# Patient Record
Sex: FEMALE | Race: WHITE | NOT HISPANIC OR LATINO
[De-identification: names, ages, dates, MRNs, and addresses within clinical notes are randomized per-mention and may not be internally consistent; named-entity substitution may affect disease eponyms.]

---

## 2017-04-03 ENCOUNTER — APPOINTMENT (OUTPATIENT)
Dept: HEMATOLOGY ONCOLOGY | Facility: CLINIC | Age: 77
End: 2017-04-03

## 2017-04-03 VITALS
HEART RATE: 76 BPM | SYSTOLIC BLOOD PRESSURE: 188 MMHG | WEIGHT: 150 LBS | TEMPERATURE: 100 F | HEIGHT: 62 IN | BODY MASS INDEX: 27.6 KG/M2 | RESPIRATION RATE: 14 BRPM | DIASTOLIC BLOOD PRESSURE: 71 MMHG

## 2017-04-04 LAB
ALBUMIN SERPL-MCNC: 3.9 G/DL
ALBUMIN/GLOB SERPL: 1.22
ALP SERPL-CCNC: 45 IU/L
ALT SERPL-CCNC: 8 IU/L
ANION GAP SERPL CALC-SCNC: 12 MEQ/L
AST SERPL-CCNC: 15 IU/L
BASOPHILS # BLD: 0.09 TH/MM3
BASOPHILS NFR BLD: 0.7 %
BILIRUB SERPL-MCNC: 0.6 MG/DL
BUN SERPL-MCNC: 5 MG/DL
BUN/CREAT SERPL: 8.9 %
CALCIUM SERPL-MCNC: 9.2 MG/DL
CHLORIDE SERPL-SCNC: 101 MEQ/L
CO2 SERPL-SCNC: 29 MEQ/L
CREAT SERPL-MCNC: 0.56 MG/DL
EOSINOPHIL # BLD: 0.13 TH/MM3
EOSINOPHIL NFR BLD: 1 %
ERYTHROCYTE [DISTWIDTH] IN BLOOD BY AUTOMATED COUNT: 15 %
GFR SERPL CREATININE-BSD FRML MDRD: 106
GLUCOSE SERPL-MCNC: 75 MG/DL
GRANULOCYTES # BLD: 8.07 TH/MM3
GRANULOCYTES NFR BLD: 60.8 %
HCT VFR BLD AUTO: 34.5 %
HGB BLD-MCNC: 11 G/DL
IMM GRANULOCYTES # BLD: 0.8 TH/MM3
IMM GRANULOCYTES NFR BLD: 6 %
LACTATE DEHYDROGENASE (NORTH): 187 IU/L
LYMPHOCYTES # BLD: 2.17 TH/MM3
LYMPHOCYTES NFR BLD: 16.3 %
MCH RBC QN AUTO: 27.3 PG
MCHC RBC AUTO-ENTMCNC: 31.9 G/DL
MCV RBC AUTO: 85.6 FL
MONOCYTES # BLD: 2.02 TH/MM3
MONOCYTES NFR BLD: 15.2 %
PLATELET # BLD: 90 TH/MM3
PMV BLD AUTO: 12.3 FL
POTASSIUM SERPL-SCNC: 4.3 MMOL/L
PROT SERPL-MCNC: 7.1 G/DL
RBC # BLD AUTO: 4.03 MIL/MM3
SODIUM SERPL-SCNC: 142 MEQ/L
WBC # BLD: 13.28 TH/MM3

## 2017-04-05 LAB — IGM SERPL-MCNC: 993 MG/DL

## 2017-07-07 ENCOUNTER — APPOINTMENT (OUTPATIENT)
Dept: PLASTIC SURGERY | Facility: CLINIC | Age: 77
End: 2017-07-07

## 2017-07-07 ENCOUNTER — OUTPATIENT (OUTPATIENT)
Dept: OUTPATIENT SERVICES | Facility: HOSPITAL | Age: 77
LOS: 1 days | Discharge: HOME | End: 2017-07-07

## 2017-07-10 DIAGNOSIS — D48.5 NEOPLASM OF UNCERTAIN BEHAVIOR OF SKIN: ICD-10-CM

## 2017-07-14 ENCOUNTER — APPOINTMENT (OUTPATIENT)
Dept: PLASTIC SURGERY | Facility: CLINIC | Age: 77
End: 2017-07-14

## 2017-07-14 DIAGNOSIS — D48.5 NEOPLASM OF UNCERTAIN BEHAVIOR OF SKIN: ICD-10-CM

## 2017-07-21 ENCOUNTER — RESULT REVIEW (OUTPATIENT)
Age: 77
End: 2017-07-21

## 2017-07-21 ENCOUNTER — APPOINTMENT (OUTPATIENT)
Dept: HEMATOLOGY ONCOLOGY | Facility: CLINIC | Age: 77
End: 2017-07-21

## 2017-07-21 ENCOUNTER — OUTPATIENT (OUTPATIENT)
Dept: OUTPATIENT SERVICES | Facility: HOSPITAL | Age: 77
LOS: 1 days | Discharge: HOME | End: 2017-07-21

## 2017-07-21 VITALS
HEIGHT: 62 IN | TEMPERATURE: 99.6 F | RESPIRATION RATE: 14 BRPM | HEART RATE: 76 BPM | SYSTOLIC BLOOD PRESSURE: 200 MMHG | WEIGHT: 150 LBS | DIASTOLIC BLOOD PRESSURE: 83 MMHG | BODY MASS INDEX: 27.6 KG/M2

## 2017-07-21 DIAGNOSIS — C88.4 EXTRANODAL MARGINAL ZONE B-CELL LYMPHOMA OF MUCOSA-ASSOCIATED LYMPHOID TISSUE [MALT-LYMPHOMA]: ICD-10-CM

## 2017-07-26 ENCOUNTER — APPOINTMENT (OUTPATIENT)
Dept: HEMATOLOGY ONCOLOGY | Facility: CLINIC | Age: 77
End: 2017-07-26

## 2017-07-28 ENCOUNTER — APPOINTMENT (OUTPATIENT)
Dept: INFUSION THERAPY | Facility: CLINIC | Age: 77
End: 2017-07-28

## 2017-08-04 ENCOUNTER — RESULT REVIEW (OUTPATIENT)
Age: 77
End: 2017-08-04

## 2017-08-04 ENCOUNTER — OTHER (OUTPATIENT)
Age: 77
End: 2017-08-04

## 2017-08-04 ENCOUNTER — APPOINTMENT (OUTPATIENT)
Dept: INFUSION THERAPY | Facility: CLINIC | Age: 77
End: 2017-08-04

## 2017-08-04 VITALS
HEART RATE: 64 BPM | TEMPERATURE: 97.2 F | SYSTOLIC BLOOD PRESSURE: 162 MMHG | RESPIRATION RATE: 14 BRPM | DIASTOLIC BLOOD PRESSURE: 64 MMHG

## 2017-08-04 LAB
BASOPHILS # BLD: 0.07 TH/MM3
BASOPHILS NFR BLD: 0.3 %
EOSINOPHIL # BLD: 0.13 TH/MM3
EOSINOPHIL NFR BLD: 0.6 %
ERYTHROCYTE [DISTWIDTH] IN BLOOD BY AUTOMATED COUNT: 15.9 %
GRANULOCYTES # BLD: 15.43 TH/MM3
GRANULOCYTES NFR BLD: 68 %
HCT VFR BLD AUTO: 37.7 %
HGB BLD-MCNC: 12.1 G/DL
IMM GRANULOCYTES # BLD: 1.9 TH/MM3
IMM GRANULOCYTES NFR BLD: 8.4 %
LYMPHOCYTES # BLD: 2.24 TH/MM3
LYMPHOCYTES NFR BLD: 9.9 %
MCH RBC QN AUTO: 26.6 PG
MCHC RBC AUTO-ENTMCNC: 32.1 G/DL
MCV RBC AUTO: 82.9 FL
MONOCYTES # BLD: 2.91 TH/MM3
MONOCYTES NFR BLD: 12.8 %
PLATELET # BLD: 97 TH/MM3
PMV BLD AUTO: 11.8 FL
RBC # BLD AUTO: 4.55 MIL/MM3
WBC # BLD: 22.68 TH/MM3

## 2017-08-07 LAB
APPEARANCE UR: CLEAR
BACTERIA UR CULT: NORMAL
BILIRUB UR QL STRIP: NEGATIVE
COLOR UR: YELLOW
GLUCOSE UR STRIP-MCNC: NEGATIVE MG/DL
HGB UR QL STRIP: ABNORMAL
KETONES UR STRIP-MCNC: NEGATIVE MG/DL
NITRITE UR QL STRIP: NEGATIVE
PH UR STRIP: 6.5
PROT UR STRIP-MCNC: NEGATIVE MG/DL
RBC #/AREA URNS HPF: NORMAL P/HPF
SP GR UR STRIP: 1.01
UROBILINOGEN UR STRIP-MCNC: 0.2 MG/DL
WBC URNS QL MICRO: ABNORMAL P/HPF
WBC URNS QL MICRO: NEGATIVE

## 2017-08-11 ENCOUNTER — APPOINTMENT (OUTPATIENT)
Dept: INFUSION THERAPY | Facility: CLINIC | Age: 77
End: 2017-08-11

## 2017-08-11 LAB
BASOPHILS # BLD: 0.08 TH/MM3
BASOPHILS NFR BLD: 0.3 %
EOSINOPHIL # BLD: 0.22 TH/MM3
EOSINOPHIL NFR BLD: 1 %
ERYTHROCYTE [DISTWIDTH] IN BLOOD BY AUTOMATED COUNT: 16.4 %
GRANULOCYTES # BLD: 16.2 TH/MM3
GRANULOCYTES NFR BLD: 70 %
HCT VFR BLD AUTO: 37.8 %
HGB BLD-MCNC: 12.2 G/DL
IMM GRANULOCYTES # BLD: 1.84 TH/MM3
IMM GRANULOCYTES NFR BLD: 7.9 %
LYMPHOCYTES # BLD: 1.99 TH/MM3
LYMPHOCYTES NFR BLD: 8.6 %
MCH RBC QN AUTO: 26.8 PG
MCHC RBC AUTO-ENTMCNC: 32.3 G/DL
MCV RBC AUTO: 83.1 FL
MONOCYTES # BLD: 2.82 TH/MM3
MONOCYTES NFR BLD: 12.2 %
PLATELET # BLD: 94 TH/MM3
RBC # BLD AUTO: 4.55 MIL/MM3
WBC # BLD: 23.15 TH/MM3

## 2017-08-13 RX ORDER — BETAMETHASONE DIPROPIONATE 0.5 MG/G
0.05 CREAM TOPICAL
Qty: 45 | Refills: 0 | Status: COMPLETED | COMMUNITY
Start: 2017-06-13

## 2017-08-14 ENCOUNTER — APPOINTMENT (OUTPATIENT)
Dept: HEMATOLOGY ONCOLOGY | Facility: CLINIC | Age: 77
End: 2017-08-14

## 2017-08-14 VITALS
WEIGHT: 152 LBS | HEIGHT: 62 IN | TEMPERATURE: 98.5 F | BODY MASS INDEX: 27.97 KG/M2 | SYSTOLIC BLOOD PRESSURE: 166 MMHG | HEART RATE: 61 BPM | DIASTOLIC BLOOD PRESSURE: 82 MMHG

## 2017-08-18 ENCOUNTER — APPOINTMENT (OUTPATIENT)
Dept: INFUSION THERAPY | Facility: CLINIC | Age: 77
End: 2017-08-18

## 2017-08-21 LAB
BASOPHILS # BLD: 0.13 TH/MM3
BASOPHILS NFR BLD: 0.5 %
EOSINOPHIL # BLD: 0.24 TH/MM3
EOSINOPHIL NFR BLD: 1 %
ERYTHROCYTE [DISTWIDTH] IN BLOOD BY AUTOMATED COUNT: 16.8 %
GRANULOCYTES # BLD: 16.7 TH/MM3
GRANULOCYTES NFR BLD: 69.4 %
HCT VFR BLD AUTO: 37.9 %
HGB BLD-MCNC: 12.2 G/DL
IMM GRANULOCYTES # BLD: 1.81 TH/MM3
IMM GRANULOCYTES NFR BLD: 7.5 %
LYMPHOCYTES # BLD: 1.65 TH/MM3
LYMPHOCYTES NFR BLD: 6.8 %
MCH RBC QN AUTO: 27 PG
MCHC RBC AUTO-ENTMCNC: 32.2 G/DL
MCV RBC AUTO: 83.8 FL
MONOCYTES # BLD: 3.57 TH/MM3
MONOCYTES NFR BLD: 14.8 %
PLATELET # BLD: 79 TH/MM3
RBC # BLD AUTO: 4.52 MIL/MM3
WBC # BLD: 24.1 TH/MM3

## 2017-09-11 ENCOUNTER — APPOINTMENT (OUTPATIENT)
Dept: HEMATOLOGY ONCOLOGY | Facility: CLINIC | Age: 77
End: 2017-09-11

## 2017-09-11 VITALS
WEIGHT: 151 LBS | HEIGHT: 62 IN | DIASTOLIC BLOOD PRESSURE: 68 MMHG | TEMPERATURE: 97.1 F | RESPIRATION RATE: 14 BRPM | SYSTOLIC BLOOD PRESSURE: 178 MMHG | HEART RATE: 65 BPM | BODY MASS INDEX: 27.79 KG/M2

## 2017-09-11 LAB
BASOPHILS # BLD: 0.07 TH/MM3
BASOPHILS NFR BLD: 0.3 %
EOSINOPHIL # BLD: 0.19 TH/MM3
EOSINOPHIL NFR BLD: 0.9 %
ERYTHROCYTE [DISTWIDTH] IN BLOOD BY AUTOMATED COUNT: 16.7 %
GRANULOCYTES # BLD: 14.8 TH/MM3
GRANULOCYTES NFR BLD: 67.5 %
HCT VFR BLD AUTO: 39.5 %
HGB BLD-MCNC: 12.8 G/DL
IMM GRANULOCYTES # BLD: 1.3 TH/MM3
IMM GRANULOCYTES NFR BLD: 5.9 %
LYMPHOCYTES # BLD: 2.05 TH/MM3
LYMPHOCYTES NFR BLD: 9.4 %
MCH RBC QN AUTO: 27.1 PG
MCHC RBC AUTO-ENTMCNC: 32.4 G/DL
MCV RBC AUTO: 83.7 FL
MONOCYTES # BLD: 3.5 TH/MM3
MONOCYTES NFR BLD: 16 %
PLATELET # BLD: 85 TH/MM3
RBC # BLD AUTO: 4.72 MIL/MM3
WBC # BLD: 21.91 TH/MM3

## 2017-10-06 ENCOUNTER — RESULT REVIEW (OUTPATIENT)
Age: 77
End: 2017-10-06

## 2017-10-06 ENCOUNTER — OUTPATIENT (OUTPATIENT)
Dept: OUTPATIENT SERVICES | Facility: HOSPITAL | Age: 77
LOS: 1 days | Discharge: HOME | End: 2017-10-06

## 2017-10-06 ENCOUNTER — APPOINTMENT (OUTPATIENT)
Dept: HEMATOLOGY ONCOLOGY | Facility: CLINIC | Age: 77
End: 2017-10-06

## 2017-10-06 VITALS
WEIGHT: 153 LBS | HEIGHT: 62 IN | TEMPERATURE: 98.2 F | DIASTOLIC BLOOD PRESSURE: 99 MMHG | BODY MASS INDEX: 28.16 KG/M2 | RESPIRATION RATE: 14 BRPM | HEART RATE: 58 BPM | SYSTOLIC BLOOD PRESSURE: 194 MMHG

## 2017-10-06 DIAGNOSIS — C88.4 EXTRANODAL MARGINAL ZONE B-CELL LYMPHOMA OF MUCOSA-ASSOCIATED LYMPHOID TISSUE [MALT-LYMPHOMA]: ICD-10-CM

## 2017-10-06 LAB
ALBUMIN SERPL-MCNC: 4.2 G/DL
ALBUMIN/GLOB SERPL: 1.56
ALP SERPL-CCNC: 43 IU/L
ALT SERPL-CCNC: 9 IU/L
ANION GAP SERPL CALC-SCNC: 5 MEQ/L
AST SERPL-CCNC: 13 IU/L
BILIRUB SERPL-MCNC: 0.4 MG/DL
BUN SERPL-MCNC: 7 MG/DL
BUN/CREAT SERPL: 12.3 %
CALCIUM SERPL-MCNC: 9.6 MG/DL
CHLORIDE SERPL-SCNC: 106 MEQ/L
CO2 SERPL-SCNC: 30 MEQ/L
CREAT SERPL-MCNC: 0.57 MG/DL
GFR SERPL CREATININE-BSD FRML MDRD: 103
GLUCOSE SERPL-MCNC: 104 MG/DL
POTASSIUM SERPL-SCNC: 4.2 MMOL/L
PROT SERPL-MCNC: 6.9 G/DL
SODIUM SERPL-SCNC: 141 MEQ/L

## 2017-10-10 ENCOUNTER — RESULT REVIEW (OUTPATIENT)
Age: 77
End: 2017-10-10

## 2017-10-10 LAB — IGM SERPL-MCNC: 693 MG/DL

## 2017-12-18 ENCOUNTER — OUTPATIENT (OUTPATIENT)
Dept: OUTPATIENT SERVICES | Facility: HOSPITAL | Age: 77
LOS: 1 days | Discharge: HOME | End: 2017-12-18

## 2017-12-18 ENCOUNTER — APPOINTMENT (OUTPATIENT)
Dept: HEMATOLOGY ONCOLOGY | Facility: CLINIC | Age: 77
End: 2017-12-18

## 2017-12-18 VITALS
SYSTOLIC BLOOD PRESSURE: 148 MMHG | HEART RATE: 67 BPM | WEIGHT: 155 LBS | BODY MASS INDEX: 28.52 KG/M2 | DIASTOLIC BLOOD PRESSURE: 90 MMHG | RESPIRATION RATE: 14 BRPM | HEIGHT: 62 IN | TEMPERATURE: 97.7 F

## 2017-12-18 LAB
BASOPHILS # BLD: 0.08 TH/MM3
BASOPHILS NFR BLD: 0.3 %
EOSINOPHIL # BLD: 0.17 TH/MM3
EOSINOPHIL NFR BLD: 0.6 %
ERYTHROCYTE [DISTWIDTH] IN BLOOD BY AUTOMATED COUNT: 15.4 %
GRANULOCYTES # BLD: 21.68 TH/MM3
GRANULOCYTES NFR BLD: 72.2 %
HCT VFR BLD AUTO: 38 %
HGB BLD-MCNC: 12.1 G/DL
IMM GRANULOCYTES # BLD: 2.38 TH/MM3
IMM GRANULOCYTES NFR BLD: 7.9 %
LYMPHOCYTES # BLD: 1.8 TH/MM3
LYMPHOCYTES NFR BLD: 6 %
MCH RBC QN AUTO: 27.4 PG
MCHC RBC AUTO-ENTMCNC: 31.8 G/DL
MCV RBC AUTO: 86 FL
MONOCYTES # BLD: 3.89 TH/MM3
MONOCYTES NFR BLD: 13 %
PLATELET # BLD: 109 TH/MM3
RBC # BLD AUTO: 4.42 MIL/MM3
WBC # BLD: 30 TH/MM3

## 2017-12-19 LAB
ALBUMIN SERPL-MCNC: 4.3 G/DL
ALBUMIN/GLOB SERPL: 1.79
ALP SERPL-CCNC: 40 IU/L
ALT SERPL-CCNC: 11 IU/L
ANION GAP SERPL CALC-SCNC: 7 MEQ/L
AST SERPL-CCNC: 16 IU/L
BILIRUB SERPL-MCNC: 0.6 MG/DL
BUN SERPL-MCNC: 8 MG/DL
BUN/CREAT SERPL: 13.6 %
CALCIUM SERPL-MCNC: 8.9 MG/DL
CHLORIDE SERPL-SCNC: 105 MEQ/L
CO2 SERPL-SCNC: 28 MEQ/L
CREAT SERPL-MCNC: 0.59 MG/DL
GFR SERPL CREATININE-BSD FRML MDRD: 99
GLUCOSE SERPL-MCNC: 82 MG/DL
IGM SERPL-MCNC: 582 MG/DL
LACTATE DEHYDROGENASE (NORTH): 198 IU/L
POTASSIUM SERPL-SCNC: 4.4 MMOL/L
PROT SERPL-MCNC: 6.7 G/DL
SODIUM SERPL-SCNC: 140 MEQ/L

## 2017-12-20 DIAGNOSIS — C88.4 EXTRANODAL MARGINAL ZONE B-CELL LYMPHOMA OF MUCOSA-ASSOCIATED LYMPHOID TISSUE [MALT-LYMPHOMA]: ICD-10-CM

## 2017-12-26 LAB
BCR/ABL BY PCR (NORTH): NORMAL
JAK2 GENE MUT ANL BLD/T: NORMAL

## 2018-03-24 ENCOUNTER — INPATIENT (INPATIENT)
Facility: HOSPITAL | Age: 78
LOS: 4 days | Discharge: HOME | End: 2018-03-29
Attending: INTERNAL MEDICINE | Admitting: INTERNAL MEDICINE

## 2018-03-24 VITALS
SYSTOLIC BLOOD PRESSURE: 140 MMHG | RESPIRATION RATE: 18 BRPM | TEMPERATURE: 98 F | DIASTOLIC BLOOD PRESSURE: 63 MMHG | HEART RATE: 87 BPM | OXYGEN SATURATION: 100 %

## 2018-03-24 DIAGNOSIS — Z85.72 PERSONAL HISTORY OF NON-HODGKIN LYMPHOMAS: ICD-10-CM

## 2018-03-24 DIAGNOSIS — R06.09 OTHER FORMS OF DYSPNEA: ICD-10-CM

## 2018-03-24 DIAGNOSIS — D69.6 THROMBOCYTOPENIA, UNSPECIFIED: ICD-10-CM

## 2018-03-24 DIAGNOSIS — D69.59 OTHER SECONDARY THROMBOCYTOPENIA: ICD-10-CM

## 2018-03-24 DIAGNOSIS — C85.90 NON-HODGKIN LYMPHOMA, UNSPECIFIED, UNSPECIFIED SITE: ICD-10-CM

## 2018-03-24 DIAGNOSIS — Z90.710 ACQUIRED ABSENCE OF BOTH CERVIX AND UTERUS: Chronic | ICD-10-CM

## 2018-03-24 LAB
ALBUMIN SERPL ELPH-MCNC: 4 G/DL — SIGNIFICANT CHANGE UP (ref 3.5–5.2)
ALP SERPL-CCNC: 166 U/L — HIGH (ref 30–115)
ALT FLD-CCNC: 15 U/L — SIGNIFICANT CHANGE UP (ref 0–41)
ANION GAP SERPL CALC-SCNC: 16 MMOL/L — HIGH (ref 7–14)
ANISOCYTOSIS BLD QL: SIGNIFICANT CHANGE UP
AST SERPL-CCNC: 27 U/L — SIGNIFICANT CHANGE UP (ref 0–41)
BASE EXCESS BLDV CALC-SCNC: 5.3 MMOL/L — HIGH (ref -2–2)
BASOPHILS # BLD AUTO: 0.72 K/UL — HIGH (ref 0–0.2)
BASOPHILS NFR BLD AUTO: 1 % — SIGNIFICANT CHANGE UP (ref 0–1)
BILIRUB SERPL-MCNC: 1.4 MG/DL — HIGH (ref 0.2–1.2)
BUN SERPL-MCNC: 18 MG/DL — SIGNIFICANT CHANGE UP (ref 10–20)
CA-I SERPL-SCNC: 1.1 MMOL/L — LOW (ref 1.12–1.3)
CALCIUM SERPL-MCNC: 8.5 MG/DL — SIGNIFICANT CHANGE UP (ref 8.5–10.1)
CHLORIDE SERPL-SCNC: 93 MMOL/L — LOW (ref 98–110)
CK MB BLD-MCNC: SIGNIFICANT CHANGE UP % (ref 0–4)
CK MB CFR SERPL CALC: <0.1 NG/ML — LOW (ref 0.6–6.3)
CK SERPL-CCNC: 14 U/L — SIGNIFICANT CHANGE UP (ref 0–225)
CK SERPL-CCNC: 19 U/L — SIGNIFICANT CHANGE UP (ref 0–225)
CO2 SERPL-SCNC: 28 MMOL/L — SIGNIFICANT CHANGE UP (ref 17–32)
CREAT SERPL-MCNC: 0.8 MG/DL — SIGNIFICANT CHANGE UP (ref 0.7–1.5)
D DIMER BLD IA.RAPID-MCNC: 3061 NG/ML DDU — HIGH (ref 0–230)
EOSINOPHIL NFR BLD AUTO: 0 % — SIGNIFICANT CHANGE UP (ref 0–8)
GAS PNL BLDV: 136 MMOL/L — SIGNIFICANT CHANGE UP (ref 136–145)
GAS PNL BLDV: SIGNIFICANT CHANGE UP
GLUCOSE SERPL-MCNC: 146 MG/DL — HIGH (ref 70–99)
HCO3 BLDV-SCNC: 30 MMOL/L — HIGH (ref 22–29)
HCT VFR BLD CALC: 34.3 % — LOW (ref 37–47)
HCT VFR BLDA CALC: 36 % — SIGNIFICANT CHANGE UP (ref 34–44)
HGB BLD CALC-MCNC: 11.8 G/DL — LOW (ref 14–18)
HGB BLD-MCNC: 11.6 G/DL — LOW (ref 12–16)
HOROWITZ INDEX BLDV+IHG-RTO: 21 — SIGNIFICANT CHANGE UP
HYPOCHROMIA BLD QL: SIGNIFICANT CHANGE UP
LACTATE BLDV-MCNC: 2.3 MMOL/L — HIGH (ref 0.5–1.6)
LYMPHOCYTES # BLD AUTO: 12 % — LOW (ref 20.5–51.1)
LYMPHOCYTES # BLD AUTO: 8.59 K/UL — HIGH (ref 1.2–3.4)
MCHC RBC-ENTMCNC: 26.2 PG — LOW (ref 27–31)
MCHC RBC-ENTMCNC: 33.8 G/DL — SIGNIFICANT CHANGE UP (ref 32–37)
MCV RBC AUTO: 77.6 FL — LOW (ref 81–99)
METAMYELOCYTES # FLD: 7 % — HIGH (ref 0–0)
MONOCYTES # BLD AUTO: 6.44 K/UL — HIGH (ref 0.1–0.6)
MONOCYTES NFR BLD AUTO: 9 % — SIGNIFICANT CHANGE UP (ref 1.7–9.3)
MYELOCYTES NFR BLD: 7 % — HIGH (ref 0–0)
NEUTROPHILS # BLD AUTO: 42.96 K/UL — HIGH (ref 1.4–6.5)
NEUTROPHILS NFR BLD AUTO: 45 % — SIGNIFICANT CHANGE UP (ref 42.2–75.2)
NEUTS BAND # BLD: 15 % — HIGH (ref 0–6)
NRBC # BLD: 1 /100 — HIGH (ref 0–0)
NRBC # BLD: SIGNIFICANT CHANGE UP /100 WBCS (ref 0–0)
NT-PROBNP SERPL-SCNC: 1241 PG/ML — HIGH (ref 0–300)
PCO2 BLDV: 46 MMHG — SIGNIFICANT CHANGE UP (ref 41–51)
PH BLDV: 7.43 — SIGNIFICANT CHANGE UP (ref 7.26–7.43)
PLAT MORPH BLD: (no result)
PLATELET # BLD AUTO: 33 K/UL — LOW (ref 130–400)
PLATELET COUNT - ESTIMATE: (no result)
PO2 BLDV: 19 MMHG — LOW (ref 20–40)
POLYCHROMASIA BLD QL SMEAR: SLIGHT — SIGNIFICANT CHANGE UP
POTASSIUM BLDV-SCNC: 3.2 MMOL/L — LOW (ref 3.3–5.6)
POTASSIUM SERPL-MCNC: 3.5 MMOL/L — SIGNIFICANT CHANGE UP (ref 3.5–5)
POTASSIUM SERPL-SCNC: 3.5 MMOL/L — SIGNIFICANT CHANGE UP (ref 3.5–5)
PROMYELOCYTES # FLD: 2 % — HIGH (ref 0–0)
PROT SERPL-MCNC: 7.4 G/DL — SIGNIFICANT CHANGE UP (ref 6–8)
RBC # BLD: 4.42 M/UL — SIGNIFICANT CHANGE UP (ref 4.2–5.4)
RBC # FLD: 20.4 % — HIGH (ref 11.5–14.5)
RBC BLD AUTO: (no result)
SAO2 % BLDV: 20 % — SIGNIFICANT CHANGE UP
SODIUM SERPL-SCNC: 137 MMOL/L — SIGNIFICANT CHANGE UP (ref 135–146)
TARGETS BLD QL SMEAR: SIGNIFICANT CHANGE UP
TROPONIN T SERPL-MCNC: <0.01 NG/ML — SIGNIFICANT CHANGE UP
TROPONIN T SERPL-MCNC: <0.01 NG/ML — SIGNIFICANT CHANGE UP
VARIANT LYMPHS # BLD: 2 % — SIGNIFICANT CHANGE UP (ref 0–5)
WBC # BLD: 71.6 K/UL — CRITICAL HIGH (ref 4.8–10.8)
WBC # FLD AUTO: 71.6 K/UL — CRITICAL HIGH (ref 4.8–10.8)

## 2018-03-24 RX ORDER — SODIUM CHLORIDE 9 MG/ML
3 INJECTION INTRAMUSCULAR; INTRAVENOUS; SUBCUTANEOUS ONCE
Qty: 0 | Refills: 0 | Status: COMPLETED | OUTPATIENT
Start: 2018-03-24 | End: 2018-03-24

## 2018-03-24 RX ADMIN — SODIUM CHLORIDE 3 MILLILITER(S): 9 INJECTION INTRAMUSCULAR; INTRAVENOUS; SUBCUTANEOUS at 12:51

## 2018-03-24 NOTE — ED PROVIDER NOTE - ATTENDING CONTRIBUTION TO CARE
78 yo female, PMH of NH lymphoma, presents to the ED with dyspnea for the past month. Patient reports recently had air travel to [place] one month ago. Patient reports when she returned from trip she also had lower back pain, which has resolved as of two weeks ago but dyspnea has still been persistent. Patient states at rest she feels fine but with exertion becomes more dyspneic. Denies chest pain and leg swelling and pain. Exam: Well appearing, NAD, S1S2, RRR, radial pulses are 2+ and equal b/l, lungs CTA b/l, abdomen I soft NT/ND, no lower extremity swelling or TTP of calfs. Plan: EKG, enzymes, labs, CXR, albuterol, will consider CT chest based on wells criteria and history of malignancy.  I will most likley admit patient also relates that she was being worked up for possible cll

## 2018-03-24 NOTE — H&P ADULT - HISTORY OF PRESENT ILLNESS
Pt is a 78 y/o female with a hx of Non Hodgkins Lymphoma, who presented to the ED with dyspnea on exertion x 1 month, with no improvement or worsening since onset. She reports having a back injury while moving a suitcase, which was then followed by lower back pain and SOB. Pt denies chest pain, palpitations, fever/chills, or other complaints.    She is followed by Oncologist Dr Guy and is scheduled 3/26 for an appt. Pt reports her WBC is usually 30. WBC today is 70, Plt 33

## 2018-03-24 NOTE — ED PROVIDER NOTE - OBJECTIVE STATEMENT
76 y/o female c/o dyspnea x 1 month. patient with  hx of lymphoma and recent air travel from [place] one month ago . patient with resolved back pain and denies any leg swelling, chest pain, fever,chills, sick contacts.

## 2018-03-24 NOTE — H&P ADULT - NSHPLABSRESULTS_GEN_ALL_CORE
11.6   71.60 )-----------( 33       ( 24 Mar 2018 12:49 )             34.3       03-24    137  |  93<L>  |  18  ----------------------------<  146<H>  3.5   |  28  |  0.8    Ca    8.5      24 Mar 2018 12:49    TPro  7.4  /  Alb  4.0  /  TBili  1.4<H>  /  DBili  x   /  AST  27  /  ALT  15  /  AlkPhos  166<H>  03-24            CARDIAC MARKERS ( 24 Mar 2018 12:49 )  x     / <0.01 ng/mL / 19 U/L / x     / x    Ct Chest w IV contrast  < from: CT Chest w/ IV Cont (03.24.18 @ 14:47) >    1.  No pulmonary embolus.   2.  Small left pleural effusion.  3.  Mild emphysema.  4.  Stable 9 mm focal groundglass in the right lower lobe on series 3   image 131 stable since at least 4/7/2016.  5.  Partially imaged splenomegaly.

## 2018-03-24 NOTE — ED PROVIDER NOTE - MEDICAL DECISION MAKING DETAILS
78 yo female, PMH of NH lymphoma, presents to the ED with dyspnea for the past month. Patient reports recently had air travel to [place] one month ago. Patient reports when she returned from trip she also had lower back pain, which has resolved as of two weeks ago but dyspnea has still been persistent. Patient states at rest she feels fine but with exertion becomes more dyspneic. Denies chest pain and leg swelling and pain. Exam: Well appearing, NAD, S1S2, RRR, radial pulses are 2+ and equal b/l, lungs CTA b/l, abdomen I soft NT/ND, no lower extremity swelling or TTP of calfs. Plan: EKG, enzymes, labs, CXR, albuterol, will consider CT chest. 78 yo female, PMH of NH lymphoma, presents to the ED with dyspnea for the past month. Patient reports recently had air travel to [place] one month ago. Patient reports when she returned from trip she also had lower back pain, which has resolved as of two weeks ago but dyspnea has still been persistent. Patient states at rest she feels fine but with exertion becomes more dyspneic. Denies chest pain and leg swelling and pain. Exam: Well appearing, NAD, S1S2, RRR, radial pulses are 2+ and equal b/l, lungs CTA b/l, abdomen I soft NT/ND, no lower extremity swelling or TTP of calfs. Plan: EKG, enzymes, labs, CXR, albuterol, will consider CT chest based on wells criteria and history of malignancy.  I will most likley admit patient also relates that she was being worked up for possible cll

## 2018-03-24 NOTE — ED PROVIDER NOTE - NS ED ROS FT
Review of Systems    Constitutional: (-) fever/ chills (-) weight loss  Eyes/ENT: (-) blurry vision, (-) epistaxis (-) sore throat (-) ear pain  Cardiovascular: (-) chest pain, (-) syncope (-) palpitations  Respiratory: (-) cough, (+) shortness of breath  Gastrointestinal: (-) vomiting, (-) diarrhea (-) abdominal pain  Musculoskeletal: (-) neck pain, (-) back pain, (-) joint pain (-) pedal edema   Integumentary: (-) rash, (-) swelling  Neurological: (-) headache, (-) altered mental status  Psychiatric: (-) hallucinations or depression   Allergic/Immunologic: (-) pruritus

## 2018-03-24 NOTE — ED ADULT NURSE REASSESSMENT NOTE - NS ED NURSE REASSESS COMMENT FT1
Pr received AAOx3 w/ family at bedside. Pt breathing room air, normal WOB; portable cardiac monitor in place; awaiting transportation. HR 80bpm normal sinus rhythm. No pain/complaints at this time. No SOB. Will continue to assess.

## 2018-03-24 NOTE — ED PROVIDER NOTE - PROGRESS NOTE DETAILS
patient states her WBC from december was 30 K . patient has appt with dr. holland on Monday dr. hamlin accepts admission

## 2018-03-25 LAB
ANION GAP SERPL CALC-SCNC: 14 MMOL/L — SIGNIFICANT CHANGE UP (ref 7–14)
BUN SERPL-MCNC: 16 MG/DL — SIGNIFICANT CHANGE UP (ref 10–20)
CALCIUM SERPL-MCNC: 8 MG/DL — LOW (ref 8.5–10.1)
CHLORIDE SERPL-SCNC: 94 MMOL/L — LOW (ref 98–110)
CK MB BLD-MCNC: <1 % — SIGNIFICANT CHANGE UP (ref 0–4)
CK MB CFR SERPL CALC: <0.1 NG/ML — LOW (ref 0.6–6.3)
CK SERPL-CCNC: 16 U/L — SIGNIFICANT CHANGE UP (ref 0–225)
CO2 SERPL-SCNC: 29 MMOL/L — SIGNIFICANT CHANGE UP (ref 17–32)
CREAT SERPL-MCNC: 0.8 MG/DL — SIGNIFICANT CHANGE UP (ref 0.7–1.5)
GLUCOSE SERPL-MCNC: 100 MG/DL — HIGH (ref 70–99)
HCT VFR BLD CALC: 28.6 % — LOW (ref 37–47)
HGB BLD-MCNC: 9.9 G/DL — LOW (ref 12–16)
MCHC RBC-ENTMCNC: 26.7 PG — LOW (ref 27–31)
MCHC RBC-ENTMCNC: 34.6 G/DL — SIGNIFICANT CHANGE UP (ref 32–37)
MCV RBC AUTO: 77.1 FL — LOW (ref 81–99)
NRBC # BLD: 1 /100 WBCS — HIGH (ref 0–0)
PLATELET # BLD AUTO: 31 K/UL — LOW (ref 130–400)
POTASSIUM SERPL-MCNC: 3.4 MMOL/L — LOW (ref 3.5–5)
POTASSIUM SERPL-SCNC: 3.4 MMOL/L — LOW (ref 3.5–5)
RBC # BLD: 3.71 M/UL — LOW (ref 4.2–5.4)
RBC # FLD: 20.6 % — HIGH (ref 11.5–14.5)
SODIUM SERPL-SCNC: 137 MMOL/L — SIGNIFICANT CHANGE UP (ref 135–146)
TROPONIN T SERPL-MCNC: <0.01 NG/ML — SIGNIFICANT CHANGE UP
WBC # BLD: 55.62 K/UL — CRITICAL HIGH (ref 4.8–10.8)
WBC # FLD AUTO: 55.62 K/UL — CRITICAL HIGH (ref 4.8–10.8)

## 2018-03-25 NOTE — PROGRESS NOTE ADULT - ASSESSMENT
Dyspnea on exertion due to worsening NHL R/O Leukemic transformation  Anemia, Thrombocytopenia  Hx of COPD, ex tobacco use in the remote past  OA, DDD  GERD  Hx of BRIANNA    [place]  for [place] Hem/Onc work up, discussed pt with Dr Jackson  ECHO  daily CBC, CMP, LDH  CT of abd and pelvis

## 2018-03-26 ENCOUNTER — RESULT REVIEW (OUTPATIENT)
Age: 78
End: 2018-03-26

## 2018-03-26 ENCOUNTER — APPOINTMENT (OUTPATIENT)
Dept: HEMATOLOGY ONCOLOGY | Facility: CLINIC | Age: 78
End: 2018-03-26

## 2018-03-26 LAB
ALBUMIN SERPL ELPH-MCNC: 3.6 G/DL — SIGNIFICANT CHANGE UP (ref 3.5–5.2)
ALP SERPL-CCNC: 123 U/L — HIGH (ref 30–115)
ALT FLD-CCNC: 11 U/L — SIGNIFICANT CHANGE UP (ref 0–41)
ANION GAP SERPL CALC-SCNC: 12 MMOL/L — SIGNIFICANT CHANGE UP (ref 7–14)
AST SERPL-CCNC: 21 U/L — SIGNIFICANT CHANGE UP (ref 0–41)
BASOPHILS # BLD AUTO: 0 K/UL — SIGNIFICANT CHANGE UP (ref 0–0.2)
BASOPHILS NFR BLD AUTO: 0 % — SIGNIFICANT CHANGE UP (ref 0–1)
BILIRUB SERPL-MCNC: 1.2 MG/DL — SIGNIFICANT CHANGE UP (ref 0.2–1.2)
BLASTS # FLD: 10 % — CRITICAL HIGH (ref 0–0)
BUN SERPL-MCNC: 14 MG/DL — SIGNIFICANT CHANGE UP (ref 10–20)
CALCIUM SERPL-MCNC: 8.3 MG/DL — LOW (ref 8.5–10.1)
CHLORIDE SERPL-SCNC: 97 MMOL/L — LOW (ref 98–110)
CO2 SERPL-SCNC: 29 MMOL/L — SIGNIFICANT CHANGE UP (ref 17–32)
CREAT SERPL-MCNC: 0.7 MG/DL — SIGNIFICANT CHANGE UP (ref 0.7–1.5)
EOSINOPHIL # BLD AUTO: 0.64 K/UL — SIGNIFICANT CHANGE UP (ref 0–0.7)
EOSINOPHIL NFR BLD AUTO: 1 % — SIGNIFICANT CHANGE UP (ref 0–8)
GLUCOSE SERPL-MCNC: 110 MG/DL — HIGH (ref 70–99)
HCT VFR BLD CALC: 29.4 % — LOW (ref 37–47)
HGB BLD-MCNC: 9.9 G/DL — LOW (ref 12–16)
HYPERCHROMIA BLD QL AUTO: SLIGHT — SIGNIFICANT CHANGE UP
LDH SERPL L TO P-CCNC: 391 — HIGH (ref 50–242)
LYMPHOCYTES # BLD AUTO: 10 % — LOW (ref 20.5–51.1)
LYMPHOCYTES # BLD AUTO: 6.38 K/UL — HIGH (ref 1.2–3.4)
MACROCYTES BLD QL: SLIGHT — SIGNIFICANT CHANGE UP
MCHC RBC-ENTMCNC: 26.4 PG — LOW (ref 27–31)
MCHC RBC-ENTMCNC: 33.7 G/DL — SIGNIFICANT CHANGE UP (ref 32–37)
MCV RBC AUTO: 78.4 FL — LOW (ref 81–99)
METAMYELOCYTES # FLD: 7 % — HIGH (ref 0–0)
MICROCYTES BLD QL: SLIGHT — SIGNIFICANT CHANGE UP
MONOCYTES # BLD AUTO: 8.29 K/UL — HIGH (ref 0.1–0.6)
MONOCYTES NFR BLD AUTO: 13 % — HIGH (ref 1.7–9.3)
MYELOCYTES NFR BLD: 4 % — HIGH (ref 0–0)
NEUTROPHILS # BLD AUTO: 33.16 K/UL — HIGH (ref 1.4–6.5)
NEUTROPHILS NFR BLD AUTO: 22 % — LOW (ref 42.2–75.2)
NEUTS BAND # BLD: 30 % — HIGH (ref 0–6)
NRBC # BLD: 0 /100 — SIGNIFICANT CHANGE UP (ref 0–0)
NRBC # BLD: SIGNIFICANT CHANGE UP /100 WBCS (ref 0–0)
PLAT MORPH BLD: NORMAL — SIGNIFICANT CHANGE UP
PLATELET # BLD AUTO: 28 K/UL — LOW (ref 130–400)
POLYCHROMASIA BLD QL SMEAR: SLIGHT — SIGNIFICANT CHANGE UP
POTASSIUM SERPL-MCNC: 3.9 MMOL/L — SIGNIFICANT CHANGE UP (ref 3.5–5)
POTASSIUM SERPL-SCNC: 3.9 MMOL/L — SIGNIFICANT CHANGE UP (ref 3.5–5)
PROT SERPL-MCNC: 6.7 G/DL — SIGNIFICANT CHANGE UP (ref 6–8)
RBC # BLD: 3.75 M/UL — LOW (ref 4.2–5.4)
RBC # FLD: 22.2 % — HIGH (ref 11.5–14.5)
RBC BLD AUTO: NORMAL — SIGNIFICANT CHANGE UP
SODIUM SERPL-SCNC: 138 MMOL/L — SIGNIFICANT CHANGE UP (ref 135–146)
TARGETS BLD QL SMEAR: SLIGHT — SIGNIFICANT CHANGE UP
VARIANT LYMPHS # BLD: 3 % — SIGNIFICANT CHANGE UP (ref 0–5)
WBC # BLD: 63.76 K/UL — CRITICAL HIGH (ref 4.8–10.8)
WBC # FLD AUTO: 63.76 K/UL — CRITICAL HIGH (ref 4.8–10.8)

## 2018-03-26 NOTE — PROGRESS NOTE ADULT - ASSESSMENT
Dyspnea on exertion due to worsening NHL R/O Leukemic transformation  Anemia, Thrombocytopenia  Hx of COPD, ex tobacco use in the remote past  OA, DDD  GERD  Hx of BRIANNA    [place] [facility] [place] to  for further Hem/Onc work up, discussed pt with Dr Manuel FAITH pending  cardiology consult  daily CBC, CMP, LDH, repeat BNP  CT of abd and pelvis  Hem/Onc and bone marrow

## 2018-03-26 NOTE — CONSULT NOTE ADULT - ASSESSMENT
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  Now with worsening leukocytosis and thrombocytopenia.  Peripheral smear reviewed- Mature monocytes.   Given the worsening thrombocytopenia, will need to rule out underlying myeloproliferative disorder ( AML, CMML).  Under sterile condition after written consent was taken, bone marrow aspiration and biopsy was done. Patient tolerated the procedure well.  Will send GENE 2, BCR- ABL, lysozome, Hepatitis and HIV panel.    2. Elevated BNP. No prior history of CHF.       Get 2d echo.    3. Elevated D- Dimer. Secondary to low garde DIC.    Send PT/PTT, fibrinogen.    Further recommendations after above mentioned tests are resulted.

## 2018-03-26 NOTE — CONSULT NOTE ADULT - ATTENDING COMMENTS
Patient examined and discussed in detail with patient and house staff . History of extra zainab MZL s/p RT and rituxan . presents with SOB , high D dimers with no evidence of VTE , marked leukocytosis ( neutrophilia and monocytosis) , severe thrombocytopenia , no abnormal bleeding . elevated BNP ? Bone marrow in 2017 shows myeloid hyperplasia, GENE 2 negative and normal cytogenetics. 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

## 2018-03-26 NOTE — PROGRESS NOTE ADULT - ASSESSMENT
Pt is a 78 y/o female with a hx of Non Hodgkins Lymphoma, with Dyspnea    #)  Dyspnea on exertion.   - Monitor Pulse ox  - O2 via NC  - Repeat Cardia Enzymes  - 2d echo  - Echo.     #)  ·  Problem: Non-Hodgkin's lymphoma, unspecified body region, unspecified non-Hodgkin lymphoma type.  Plan: Oncology Consult  Monitor CBC WBC and Plt.      Problem/Plan - 3:  ·  Problem: Acquired thrombocytopenia.  Plan: Hem/Onc Consult  Hold pharmacological anticoagulation  Monitor CBC. Pt is a 78 y/o female with a hx of Non Hodgkins Lymphoma, with Dyspnea    #)  Dyspnea on exertion.   - Monitor Pulse ox  - O2 via NC  - Repeat Cardia Enzymes  - 2d echo  - Echo.     #) History of Non Hodgkins Lymphoma   - Problem: Non-Hodgkin's lymphoma, unspecified body region, unspecified non-Hodgkin lymphoma type.  Plan: Oncology Consult  Monitor CBC WBC and Plt.     #) Acquired thrombocytopenia.  Plan: Hem/Onc Consult  Hold pharmacological anticoagulation  Monitor CBC. Pt is a 76 y/o female with a hx of Non Hodgkins Lymphoma presenting with complaints of back pain and 1 month history of Dyspnea on exertion.     #)  Dyspnea on exertion.   - CT chest with IV contrast negative for any acute PE, Monitor Pulse ox  - O2 via NC  - 2D Echo results pending       #) History of Non Hodgkins Lymphoma   - Awaiting Oncology Consult  - Monitor CBC WBC and Plt.       #) Acquired thrombocytopenia.   - Hem/Onc Consult pending   - Hold pharmacological anticoagulation  - Monitor platelet count Pt is a 78 y/o female with a hx of Non Hodgkins Lymphoma presenting with complaints of back pain and 1 month history of Dyspnea on exertion.     #)  Dyspnea on exertion.   - CT chest with IV contrast negative for any acute PE, Monitor Pulse ox  - O2 via NC  - 2D Echo results pending       #) History of Non Hodgkins Lymphoma   - Awaiting Oncology Consult  - Monitor CBC WBC and Plt.       #) Acquired thrombocytopenia.   - Hem/Onc Consult pending   - Hold pharmacological anticoagulation  - Monitor platelet count     ***Heme Onc fellow did bedside BM biopsy and blood work ordered as per Dr. Jackson

## 2018-03-26 NOTE — CONSULT NOTE ADULT - SUBJECTIVE AND OBJECTIVE BOX
Patient is a 77y old  Female who presents with a chief complaint of Dyspnea x 1 month (24 Mar 2018 17:18)        HPI:  Pt is a 78 y/o female with a hx of Non Hodgkins Lymphoma, who presented to the ED with dyspnea on exertion x 1 month, with no improvement or worsening since onset. She reports having a back injury while moving a suitcase, which was then followed by lower back pain and SOB. Pt denies chest pain, palpitations, fever/chills, or other complaints. Now noted to have worsening leukocytosis and thrombocytopenia. Transferred from [facility] for further evaluation.      Oncologic History:  Marginal zone lymphoma of the face s/p RT.  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 At that time noted to have thrombocytopenia, s/p Bone marrow biopsy for suspected bone marrow involvement. It was hypercellular, however flow from bone marrow was negative.        PAST MEDICAL & SURGICAL HISTORY:  Non-Hodgkins lymphoma  S/P total abdominal hysterectomy        FAMILY HISTORY:  No pertinent family history in first degree relatives      Social History:    Allergies    penicillin (Unknown)    Intolerances        MEDICATIONS  (STANDING):    MEDICATIONS  (PRN):      Vital Signs Last 24 Hrs  T(C): 36.3 (26 Mar 2018 20:25), Max: 36.8 (26 Mar 2018 14:27)  T(F): 97.3 (26 Mar 2018 20:25), Max: 98.3 (26 Mar 2018 14:27)  HR: 71 (26 Mar 2018 20:25) (69 - 77)  BP: 119/56 (26 Mar 2018 20:25) (119/56 - 137/64)  BP(mean): --  RR: 18 (26 Mar 2018 20:25) (18 - 19)  SpO2: 94% (26 Mar 2018 10:59) (94% - 94%)    ROS:  Negative except for:    PHYSICAL EXAM  General: adult in NAD  HEENT: clear oropharynx, anicteric sclera, pink conjunctiva  Neck: supple  CV: normal S1/S2 with no murmur rubs or gallops  Lungs: positive air movement b/l ant lungs,clear to auscultation, no wheezes, no rales  Abdomen: soft non-tender non-distended, no hepatosplenomegaly  Ext: no clubbing cyanosis or edema  Skin: no rashes and no petechiae  Neuro: alert and oriented X 3, no focal deficits      LABS:                          9.9    63.76 )-----------( 28       ( 26 Mar 2018 11:56 )             29.4         Mean Cell Volume : 78.4 fL  Mean Cell Hemoglobin : 26.4 pg  Mean Cell Hemoglobin Concentration : 33.7 g/dL  Auto Neutrophil # : 33.16 K/uL  Auto Lymphocyte # : 6.38 K/uL  Auto Monocyte # : 8.29 K/uL  Auto Eosinophil # : 0.64 K/uL  Auto Basophil # : 0.00 K/uL  Auto Neutrophil % : 22.0 %  Auto Lymphocyte % : 10.0 %  Auto Monocyte % : 13.0 %  Auto Eosinophil % : 1.0 %  Auto Basophil % : 0.0 %      Serial CBC's  03-26 @ 11:56  Hct-29.4 / Hgb-9.9 / Plat-28 / RBC-3.75 / WBC-63.76  Serial CBC's  03-25 @ 06:25  Hct-28.6 / Hgb-9.9 / Plat-31 / RBC-3.71 / WBC-55.62  Serial CBC's  03-24 @ 12:49  Hct-34.3 / Hgb-11.6 / Plat-33 / RBC-4.42 / WBC-71.60      03-26    138  |  97<L>  |  14  ----------------------------<  110<H>  3.9   |  29  |  0.7    Ca    8.3<L>      26 Mar 2018 11:56    TPro  6.7  /  Alb  3.6  /  TBili  1.2  /  DBili  x   /  AST  21  /  ALT  11  /  AlkPhos  123<H>  03-26                    LIVER FUNCTIONS - ( 26 Mar 2018 11:56 )  Alb: 3.6 g/dL / Pro: 6.7 g/dL / ALK PHOS: 123 U/L / ALT: 11 U/L / AST: 21 U/L / GGT: x           BLOOD SMEAR INTERPRETATION:       RADIOLOGY & ADDITIONAL STUDIES:    Assessment And Plan:

## 2018-03-27 LAB
ALBUMIN SERPL ELPH-MCNC: 3.5 G/DL — SIGNIFICANT CHANGE UP (ref 3.5–5.2)
ALP SERPL-CCNC: 111 U/L — SIGNIFICANT CHANGE UP (ref 30–115)
ALT FLD-CCNC: 8 U/L — SIGNIFICANT CHANGE UP (ref 0–41)
ANION GAP SERPL CALC-SCNC: 11 MMOL/L — SIGNIFICANT CHANGE UP (ref 7–14)
APPEARANCE UR: (no result)
APTT BLD: 29.5 SEC — SIGNIFICANT CHANGE UP (ref 27–39.2)
AST SERPL-CCNC: 19 U/L — SIGNIFICANT CHANGE UP (ref 0–41)
BACTERIA # UR AUTO: (no result) /HPF
BASOPHILS # BLD AUTO: 0.12 K/UL — SIGNIFICANT CHANGE UP (ref 0–0.2)
BASOPHILS NFR BLD AUTO: 0.2 % — SIGNIFICANT CHANGE UP (ref 0–1)
BILIRUB SERPL-MCNC: 1.2 MG/DL — SIGNIFICANT CHANGE UP (ref 0.2–1.2)
BILIRUB UR-MCNC: NEGATIVE — SIGNIFICANT CHANGE UP
BUN SERPL-MCNC: 11 MG/DL — SIGNIFICANT CHANGE UP (ref 10–20)
CALCIUM SERPL-MCNC: 8.2 MG/DL — LOW (ref 8.5–10.1)
CHLORIDE SERPL-SCNC: 94 MMOL/L — LOW (ref 98–110)
CO2 SERPL-SCNC: 30 MMOL/L — SIGNIFICANT CHANGE UP (ref 17–32)
COLOR SPEC: YELLOW — SIGNIFICANT CHANGE UP
CREAT SERPL-MCNC: 0.8 MG/DL — SIGNIFICANT CHANGE UP (ref 0.7–1.5)
D DIMER BLD IA.RAPID-MCNC: 530 NG/ML DDU — HIGH (ref 0–230)
DIFF PNL FLD: (no result)
EOSINOPHIL # BLD AUTO: 0.04 K/UL — SIGNIFICANT CHANGE UP (ref 0–0.7)
EOSINOPHIL NFR BLD AUTO: 0.1 % — SIGNIFICANT CHANGE UP (ref 0–8)
FIBRINOGEN PPP-MCNC: 421 MG/DL — SIGNIFICANT CHANGE UP (ref 204.4–570.6)
GLUCOSE SERPL-MCNC: 155 MG/DL — HIGH (ref 70–99)
GLUCOSE UR QL: NEGATIVE MG/DL — SIGNIFICANT CHANGE UP
HCT VFR BLD CALC: 28.4 % — LOW (ref 37–47)
HGB BLD-MCNC: 9.6 G/DL — LOW (ref 12–16)
IMM GRANULOCYTES NFR BLD AUTO: 20.4 % — HIGH (ref 0.1–0.3)
INR BLD: 1.58 RATIO — HIGH (ref 0.65–1.3)
KETONES UR-MCNC: NEGATIVE — SIGNIFICANT CHANGE UP
LDH SERPL L TO P-CCNC: 383 — HIGH (ref 50–242)
LEUKOCYTE ESTERASE UR-ACNC: (no result)
LYMPHOCYTES # BLD AUTO: 2.93 K/UL — SIGNIFICANT CHANGE UP (ref 1.2–3.4)
LYMPHOCYTES # BLD AUTO: 5.2 % — LOW (ref 20.5–51.1)
MCHC RBC-ENTMCNC: 26.6 PG — LOW (ref 27–31)
MCHC RBC-ENTMCNC: 33.8 G/DL — SIGNIFICANT CHANGE UP (ref 32–37)
MCV RBC AUTO: 78.7 FL — LOW (ref 81–99)
MONOCYTES # BLD AUTO: 6.55 K/UL — HIGH (ref 0.1–0.6)
MONOCYTES NFR BLD AUTO: 11.7 % — HIGH (ref 1.7–9.3)
NEUTROPHILS # BLD AUTO: 34.87 K/UL — HIGH (ref 1.4–6.5)
NEUTROPHILS NFR BLD AUTO: 62.4 % — SIGNIFICANT CHANGE UP (ref 42.2–75.2)
NITRITE UR-MCNC: NEGATIVE — SIGNIFICANT CHANGE UP
NRBC # BLD: 0 /100 WBCS — SIGNIFICANT CHANGE UP (ref 0–0)
PH UR: 6 — SIGNIFICANT CHANGE UP (ref 5–8)
PLATELET # BLD AUTO: 30 K/UL — LOW (ref 130–400)
POTASSIUM SERPL-MCNC: 3.7 MMOL/L — SIGNIFICANT CHANGE UP (ref 3.5–5)
POTASSIUM SERPL-SCNC: 3.7 MMOL/L — SIGNIFICANT CHANGE UP (ref 3.5–5)
PROT SERPL-MCNC: 6.4 G/DL — SIGNIFICANT CHANGE UP (ref 6–8)
PROT UR-MCNC: 30 MG/DL
PROTHROM AB SERPL-ACNC: 17.2 SEC — HIGH (ref 9.95–12.87)
RBC # BLD: 3.61 M/UL — LOW (ref 4.2–5.4)
RBC # FLD: 23.1 % — HIGH (ref 11.5–14.5)
RBC CASTS # UR COMP ASSIST: (no result) /HPF
SODIUM SERPL-SCNC: 135 MMOL/L — SIGNIFICANT CHANGE UP (ref 135–146)
SP GR SPEC: 1.01 — SIGNIFICANT CHANGE UP (ref 1.01–1.03)
UROBILINOGEN FLD QL: 1 MG/DL (ref 0.2–0.2)
WBC # BLD: 55.92 K/UL — CRITICAL HIGH (ref 4.8–10.8)
WBC # FLD AUTO: 55.92 K/UL — CRITICAL HIGH (ref 4.8–10.8)
WBC UR QL: (no result) /HPF

## 2018-03-27 RX ORDER — NITROFURANTOIN MACROCRYSTAL 50 MG
100 CAPSULE ORAL
Qty: 0 | Refills: 0 | Status: DISCONTINUED | OUTPATIENT
Start: 2018-03-27 | End: 2018-03-28

## 2018-03-27 RX ADMIN — Medication 100 MILLIGRAM(S): at 15:29

## 2018-03-27 NOTE — PROGRESS NOTE ADULT - ASSESSMENT
Pt is a 78 y/o female with a hx of Non Hodgkins Lymphoma presenting with complaints of back pain and 1 month history of Dyspnea on exertion.       #) History of Non Hodgkins Lymphoma  with worsening thrombocytopenia   - Per oncology, concern that patient is transforming into acute leukemia   - s/p bone marrow aspiration/biopsy  - Blood tests ordered as per oncology--> F/U hepatitis panel, HIV, Lysozyme, Jak2, BCR-ABL   - Per discussion with Dr. Pfeiffer, will order US abdomen complete to assess for hepato- or spleno-megaly (rather than CT abdomen/pelvis at this time)   - Monitor CBC WBC and Plt.   - Pharmacological anticoagulation remains on hold     #)  Dyspnea on exertion.   - CT chest with IV contrast negative for any acute PE, Monitor Pulse ox  - O2 via NC as needed   - Elevated BNP on admission with no prior history--> 2D echo pending, will obtain cardiology consult once 2D echo results are back     #) Cystitis   - Patient today complaining of onset of increased urinary frequency and bladder pressure  - UA sent for evaluation and positive  - Started empirically on Macrobid 100mg BID for 5 days  - Urine culture pending

## 2018-03-27 NOTE — PROGRESS NOTE ADULT - ASSESSMENT
Dyspnea on exertion due to worsening NHL R/O Leukemic transformation  Anemia, Thrombocytopenia  UTI  Hx of COPD, ex tobacco use in the remote past  OA, DDD  GERD  Hx of BRIANNA    Leukocytosis, anemia and thrombocytopenia, meta and myelocytes and blast on peripheral smear, R/O leukemic transformation  Hem/Onc,  bone marrow done 3/26, results P  pt c/o dysuria, urinalysis + for bacteria and WBCs, started macrobid, culture sent  pt ad with sx of SOB and elevated BNP, ECHO and cardio consult P  daily CBC, repeat BNP Dyspnea on exertion due to worsening NHL R/O Leukemic transformation  Anemia, Thrombocytopenia  UTI  Hx of COPD, ex tobacco use in the remote past  OA, DDD  GERD  Hx of BRIANNA    Leukocytosis, anemia and thrombocytopenia, meta and myelocytes and blast on peripheral smear, R/O leukemic transformation  Hem/Onc,  bone marrow done 3/26, results P  elevated DDIMERS, CT of chest negative for PE, elevated DDIMERS probably due to low grade DIC  pt c/o dysuria, urinalysis + for bacteria and WBCs, started macrobid, culture sent  pt ad with sx of SOB and elevated BNP, ECHO and cardio consult P  daily CBC, repeat BNP

## 2018-03-28 LAB
ALBUMIN SERPL ELPH-MCNC: 3.4 G/DL — LOW (ref 3.5–5.2)
ALP SERPL-CCNC: 106 U/L — SIGNIFICANT CHANGE UP (ref 30–115)
ALT FLD-CCNC: 8 U/L — SIGNIFICANT CHANGE UP (ref 0–41)
ANION GAP SERPL CALC-SCNC: 16 MMOL/L — HIGH (ref 7–14)
AST SERPL-CCNC: 19 U/L — SIGNIFICANT CHANGE UP (ref 0–41)
BASOPHILS # BLD AUTO: 0.14 K/UL — SIGNIFICANT CHANGE UP (ref 0–0.2)
BASOPHILS NFR BLD AUTO: 0.3 % — SIGNIFICANT CHANGE UP (ref 0–1)
BILIRUB SERPL-MCNC: 1 MG/DL — SIGNIFICANT CHANGE UP (ref 0.2–1.2)
BUN SERPL-MCNC: 10 MG/DL — SIGNIFICANT CHANGE UP (ref 10–20)
CALCIUM SERPL-MCNC: 8.4 MG/DL — LOW (ref 8.5–10.1)
CHLORIDE SERPL-SCNC: 101 MMOL/L — SIGNIFICANT CHANGE UP (ref 98–110)
CMV DNA CSF QL NAA+PROBE: SIGNIFICANT CHANGE UP
CO2 SERPL-SCNC: 27 MMOL/L — SIGNIFICANT CHANGE UP (ref 17–32)
CREAT SERPL-MCNC: 0.8 MG/DL — SIGNIFICANT CHANGE UP (ref 0.7–1.5)
CULTURE RESULTS: SIGNIFICANT CHANGE UP
EOSINOPHIL # BLD AUTO: 0.05 K/UL — SIGNIFICANT CHANGE UP (ref 0–0.7)
EOSINOPHIL NFR BLD AUTO: 0.1 % — SIGNIFICANT CHANGE UP (ref 0–8)
GLUCOSE SERPL-MCNC: 168 MG/DL — HIGH (ref 70–99)
HAV IGM SER-ACNC: SIGNIFICANT CHANGE UP
HBV CORE IGM SER-ACNC: SIGNIFICANT CHANGE UP
HBV SURFACE AG SER-ACNC: SIGNIFICANT CHANGE UP
HCT VFR BLD CALC: 29.4 % — LOW (ref 37–47)
HCV AB S/CO SERPL IA: 0.15 S/CO — SIGNIFICANT CHANGE UP
HCV AB SERPL-IMP: SIGNIFICANT CHANGE UP
HGB BLD-MCNC: 9.8 G/DL — LOW (ref 12–16)
HIV 1+2 AB+HIV1 P24 AG SERPL QL IA: SIGNIFICANT CHANGE UP
IMM GRANULOCYTES NFR BLD AUTO: 19.6 % — HIGH (ref 0.1–0.3)
LDH SERPL L TO P-CCNC: 351 — HIGH (ref 50–242)
LYMPHOCYTES # BLD AUTO: 2.61 K/UL — SIGNIFICANT CHANGE UP (ref 1.2–3.4)
LYMPHOCYTES # BLD AUTO: 5.3 % — LOW (ref 20.5–51.1)
MCHC RBC-ENTMCNC: 26.9 PG — LOW (ref 27–31)
MCHC RBC-ENTMCNC: 33.3 G/DL — SIGNIFICANT CHANGE UP (ref 32–37)
MCV RBC AUTO: 80.8 FL — LOW (ref 81–99)
MONOCYTES # BLD AUTO: 4.23 K/UL — HIGH (ref 0.1–0.6)
MONOCYTES NFR BLD AUTO: 8.6 % — SIGNIFICANT CHANGE UP (ref 1.7–9.3)
NEUTROPHILS # BLD AUTO: 32.5 K/UL — HIGH (ref 1.4–6.5)
NEUTROPHILS NFR BLD AUTO: 66.1 % — SIGNIFICANT CHANGE UP (ref 42.2–75.2)
NRBC # BLD: 0 /100 WBCS — SIGNIFICANT CHANGE UP (ref 0–0)
NT-PROBNP SERPL-SCNC: 883 PG/ML — HIGH (ref 0–300)
PLATELET # BLD AUTO: 28 K/UL — LOW (ref 130–400)
POTASSIUM SERPL-MCNC: 4.1 MMOL/L — SIGNIFICANT CHANGE UP (ref 3.5–5)
POTASSIUM SERPL-SCNC: 4.1 MMOL/L — SIGNIFICANT CHANGE UP (ref 3.5–5)
PROT SERPL-MCNC: 6.8 G/DL — SIGNIFICANT CHANGE UP (ref 6–8)
RBC # BLD: 3.64 M/UL — LOW (ref 4.2–5.4)
RBC # FLD: 23.9 % — HIGH (ref 11.5–14.5)
SODIUM SERPL-SCNC: 144 MMOL/L — SIGNIFICANT CHANGE UP (ref 135–146)
SPECIMEN SOURCE: SIGNIFICANT CHANGE UP
TM INTERPRETATION: SIGNIFICANT CHANGE UP
WBC # BLD: 49.17 K/UL — CRITICAL HIGH (ref 4.8–10.8)
WBC # FLD AUTO: 49.17 K/UL — CRITICAL HIGH (ref 4.8–10.8)

## 2018-03-28 RX ORDER — NITROFURANTOIN MACROCRYSTAL 50 MG
100 CAPSULE ORAL
Qty: 0 | Refills: 0 | Status: DISCONTINUED | OUTPATIENT
Start: 2018-03-28 | End: 2018-03-29

## 2018-03-28 RX ADMIN — Medication 100 MILLIGRAM(S): at 05:34

## 2018-03-28 NOTE — PROGRESS NOTE ADULT - ASSESSMENT
Dyspnea on exertion due to worsening NHL R/O Leukemic transformation  Anemia, Thrombocytopenia  UTI  Hx of COPD, ex tobacco use in the remote past  OA, DDD  GERD  Hx of BRIANNA    Leukocytosis, anemia and thrombocytopenia, meta and myelocytes and blast on peripheral smear, R/O leukemic transformation  Hem/Onc,  bone marrow done 3/26, results P  elevated DDIMERS, CT of chest negative for PE, elevated DDIMERS probably due to low grade DIC  pt c/o dysuria, urinalysis + for bacteria and WBCs, started Abx, culture sent  ECHO is normal  sono of abdomen shows hepatosplenomegaly  daily CBC, repeat BNP  awaiting BM results and further tx plan by Hem/Onc

## 2018-03-28 NOTE — PROGRESS NOTE ADULT - ASSESSMENT
Pt is a 78 y/o female with a hx of Non Hodgkins Lymphoma presenting with complaints of back pain and 1 month history of Dyspnea on exertion.       #) History of Non Hodgkins Lymphoma  with worsening thrombocytopenia   - Per oncology, concern that patient is transforming into acute leukemia   - s/p bone marrow aspiration/biopsy  - Blood tests ordered as per oncology-->Jak2, BCR-ABL pending, sent today   - US abdomen shows splenomegaly and hepatomegaly   - Monitor CBC WBC and Plt.   - Pharmacological anticoagulation remains on hold     #)  Dyspnea on exertion.   - Monitor Pulse ox  - O2 via NC as needed   - Repeat BNP ordered and pending  - No cardiology consult at this time per discussion with Dr. Pfeiffer, can follow up as outpatient     #) Cystitis   - Patient today complaining of onset of increased urinary frequency and bladder pressure  - UA sent for evaluation and positive  - Started empirically on Macrobid 100mg BID for 5 days  - Urine culture pending

## 2018-03-28 NOTE — PROVIDER CONTACT NOTE (MEDICATION) - SITUATION
Pharmacy does not have macrobid in stock. recommended change order to macrodantin, which is in stock at pharmacy. patient made aware that change to medication.

## 2018-03-29 ENCOUNTER — TRANSCRIPTION ENCOUNTER (OUTPATIENT)
Age: 78
End: 2018-03-29

## 2018-03-29 VITALS
SYSTOLIC BLOOD PRESSURE: 133 MMHG | HEART RATE: 72 BPM | TEMPERATURE: 98 F | RESPIRATION RATE: 16 BRPM | DIASTOLIC BLOOD PRESSURE: 63 MMHG

## 2018-03-29 LAB
ALBUMIN SERPL ELPH-MCNC: 3.1 G/DL — LOW (ref 3.5–5.2)
ALP SERPL-CCNC: 96 U/L — SIGNIFICANT CHANGE UP (ref 30–115)
ALT FLD-CCNC: 7 U/L — SIGNIFICANT CHANGE UP (ref 0–41)
AST SERPL-CCNC: 16 U/L — SIGNIFICANT CHANGE UP (ref 0–41)
BASOPHILS # BLD AUTO: 0.2 K/UL — SIGNIFICANT CHANGE UP (ref 0–0.2)
BASOPHILS NFR BLD AUTO: 0.4 % — SIGNIFICANT CHANGE UP (ref 0–1)
BILIRUB SERPL-MCNC: 0.6 MG/DL — SIGNIFICANT CHANGE UP (ref 0.2–1.2)
BUN SERPL-MCNC: 8 MG/DL — LOW (ref 10–20)
CALCIUM SERPL-MCNC: 8 MG/DL — LOW (ref 8.5–10.1)
CHLORIDE SERPL-SCNC: 100 MMOL/L — SIGNIFICANT CHANGE UP (ref 98–110)
CO2 SERPL-SCNC: 28 MMOL/L — SIGNIFICANT CHANGE UP (ref 17–32)
CREAT SERPL-MCNC: 0.7 MG/DL — SIGNIFICANT CHANGE UP (ref 0.7–1.5)
EOSINOPHIL # BLD AUTO: 0.04 K/UL — SIGNIFICANT CHANGE UP (ref 0–0.7)
EOSINOPHIL NFR BLD AUTO: 0.1 % — SIGNIFICANT CHANGE UP (ref 0–8)
GLUCOSE SERPL-MCNC: 109 MG/DL — HIGH (ref 70–99)
HCT VFR BLD CALC: 25.7 % — LOW (ref 37–47)
HEMATOPATHOLOGY REPORT: SIGNIFICANT CHANGE UP
HGB BLD-MCNC: 8.5 G/DL — LOW (ref 12–16)
IMM GRANULOCYTES NFR BLD AUTO: 18.1 % — HIGH (ref 0.1–0.3)
LDH SERPL L TO P-CCNC: 313 — HIGH (ref 50–242)
LYMPHOCYTES # BLD AUTO: 2.59 K/UL — SIGNIFICANT CHANGE UP (ref 1.2–3.4)
LYMPHOCYTES # BLD AUTO: 5.4 % — LOW (ref 20.5–51.1)
LYSOZYME SERPL-MCNC: >60 MCG/ML — HIGH (ref 5–11)
MCHC RBC-ENTMCNC: 27 PG — SIGNIFICANT CHANGE UP (ref 27–31)
MCHC RBC-ENTMCNC: 33.1 G/DL — SIGNIFICANT CHANGE UP (ref 32–37)
MCV RBC AUTO: 81.6 FL — SIGNIFICANT CHANGE UP (ref 81–99)
MONOCYTES # BLD AUTO: 5.08 K/UL — HIGH (ref 0.1–0.6)
MONOCYTES NFR BLD AUTO: 10.6 % — HIGH (ref 1.7–9.3)
NEUTROPHILS # BLD AUTO: 31.17 K/UL — HIGH (ref 1.4–6.5)
NEUTROPHILS NFR BLD AUTO: 65.4 % — SIGNIFICANT CHANGE UP (ref 42.2–75.2)
NRBC # BLD: 0 /100 WBCS — SIGNIFICANT CHANGE UP (ref 0–0)
PLATELET # BLD AUTO: 29 K/UL — LOW (ref 130–400)
POTASSIUM SERPL-MCNC: 3.5 MMOL/L — SIGNIFICANT CHANGE UP (ref 3.5–5)
POTASSIUM SERPL-SCNC: 3.5 MMOL/L — SIGNIFICANT CHANGE UP (ref 3.5–5)
PROT SERPL-MCNC: 6 G/DL — SIGNIFICANT CHANGE UP (ref 6–8)
RBC # BLD: 3.15 M/UL — LOW (ref 4.2–5.4)
RBC # FLD: 24.2 % — HIGH (ref 11.5–14.5)
SODIUM SERPL-SCNC: 140 MMOL/L — SIGNIFICANT CHANGE UP (ref 135–146)
WBC # BLD: 47.71 K/UL — CRITICAL HIGH (ref 4.8–10.8)
WBC # FLD AUTO: 47.71 K/UL — CRITICAL HIGH (ref 4.8–10.8)

## 2018-03-29 RX ORDER — METHENAMINE MANDELATE 1 G
1 TABLET ORAL
Qty: 4 | Refills: 0 | OUTPATIENT
Start: 2018-03-29 | End: 2018-03-30

## 2018-03-29 RX ORDER — METHENAMINE MANDELATE 1 G
1 TABLET ORAL
Qty: 0 | Refills: 0 | COMMUNITY
Start: 2018-03-29

## 2018-03-29 RX ADMIN — Medication 100 MILLIGRAM(S): at 06:17

## 2018-03-29 RX ADMIN — Medication 100 MILLIGRAM(S): at 17:27

## 2018-03-29 NOTE — DISCHARGE NOTE ADULT - PLAN OF CARE
Monitor and treat adequately Patient underwent workup for acute conversion with Bone Marrow Biopsy. Follow up with Dr. Jackson as outpatient regarding results and any further treatment plan Treat Adequately Patient started on Nitrofuratoin for a total of 5 days Patient underwent workup for acute conversion with Bone Marrow Biopsy. Follow up with Dr. Jackson as outpatient regarding results and any further treatment plan; Patient also found to have Elevated BNP on admission. However, Echo results WNL. Patient to follow up with cardiologist of Dr. Pfeiffer's choice on discharge. Patient started on Nitrofurantoin for a total of 5 days

## 2018-03-29 NOTE — DISCHARGE NOTE ADULT - HOSPITAL COURSE
Pt is a 78 y/o female with a hx of Non Hodgkins Lymphoma presenting with complaints of back pain and 1 month history of Dyspnea on exertion. Patient with elevated WBC Count on admission. Given history of NHL, concern for acute conversion and appropriate blood tests drawn and sent per heme onc's recommendations. Patient also reported increased urinary frequency with some bladder pressure. Patient was started on Macrobid (Nitrofuratoin) and will complete course on discharge. Pt is a 76 y/o female with a hx of Non Hodgkins Lymphoma presenting with complaints of back pain and 1 month history of Dyspnea on exertion. Patient with elevated WBC Count on admission. Given history of NHL, concern for acute conversion and appropriate blood tests drawn and sent per heme onc's recommendations. BNP on admission also elevated. Echo done on this admission is essentially unremarkable. Patient to follow up with Dr. Pfeiffer on discharge and can be referred for cardiology visit as outpatient. Patient also reported increased urinary frequency with some bladder pressure. Patient was started on Macrobid (Nitrofuratoin) and will complete course on discharge.

## 2018-03-29 NOTE — DISCHARGE NOTE ADULT - MEDICATION SUMMARY - MEDICATIONS TO TAKE
I will START or STAY ON the medications listed below when I get home from the hospital:    nitrofurantoin macrocrystals 100 mg oral capsule  -- 1 cap(s) by mouth for 2 more days  -- Indication: For UTI

## 2018-03-29 NOTE — PROGRESS NOTE ADULT - ASSESSMENT
Pt is a 78 y/o female with a hx of Non Hodgkins Lymphoma presenting with complaints of back pain and 1 month history of Dyspnea on exertion.       #) History of Non Hodgkins Lymphoma  with worsening thrombocytopenia   - rule out acute transformation   - s/p bone marrow aspiration/biopsy  - awaiting Flow Cytometry, FISH, JAK2, BCR-ABL    - Monitor CBC WBC and Plt.    - Pharmacological anticoagulation remains on hold    #)  Dyspnea on exertion.   - Monitor Pulse ox  - O2 via NC as needed   - Outpatient cardiology follow up, echo results above     #) Cystitis   -  Positive UA  - Started empirically on Macrobid 100mg BID for 5 days

## 2018-03-29 NOTE — DISCHARGE NOTE ADULT - CARE PLAN
Principal Discharge DX:	Dyspnea on exertion  Goal:	Monitor and treat adequately  Assessment and plan of treatment:	Patient underwent workup for acute conversion with Bone Marrow Biopsy. Follow up with Dr. Jackson as outpatient regarding results and any further treatment plan  Secondary Diagnosis:	UTI (urinary tract infection)  Goal:	Treat Adequately  Assessment and plan of treatment:	Patient started on Nitrofuratoin for a total of 5 days Principal Discharge DX:	Dyspnea on exertion  Goal:	Monitor and treat adequately  Assessment and plan of treatment:	Patient underwent workup for acute conversion with Bone Marrow Biopsy. Follow up with Dr. Jackson as outpatient regarding results and any further treatment plan; Patient also found to have Elevated BNP on admission. However, Echo results WNL. Patient to follow up with cardiologist of Dr. Pfeiffer's choice on discharge.  Secondary Diagnosis:	UTI (urinary tract infection)  Goal:	Treat Adequately  Assessment and plan of treatment:	Patient started on Nitrofurantoin for a total of 5 days

## 2018-03-29 NOTE — DISCHARGE NOTE ADULT - PATIENT PORTAL LINK FT
You can access the [facility], offered by [facility], by registering with the following website: [email or web address]

## 2018-03-29 NOTE — PROGRESS NOTE ADULT - ASSESSMENT
Dyspnea on exertion due to worsening NHL R/O Leukemic transformation  Anemia, Thrombocytopenia, Leukocytosis  Cystitis  Hx of COPD, ex tobacco use in the remote past  OA, DDD  GERD  Hx of BRIANNA    Leukocytosis, anemia and thrombocytopenia, meta and myelocytes and blast on peripheral smear, R/O leukemic transformation  Hem/Onc,  bone marrow done 3/26, results P  elevated D DIMERS, CT of chest negative for PE, elevated DDIMERS probably due to low grade DIC  pt c/o dysuria, urinalysis + for bacteria and WBCs, started Abx, Macrobid changed to Macrodantin, culture sent  ECHO is normal  sono of abdomen shows hepatosplenomegaly  daily CBC, repeat  from 1241  awaiting BM results and further tx plan by Hem/Onc

## 2018-03-29 NOTE — DISCHARGE NOTE ADULT - CARE PROVIDER_API CALL
Katherine Pfeiffer), Medicine  [address] [facility] [place]  [address]  [place] [address]  Phone: [phone]  Fax: [phone]    Mitch Jackson), Internal Medicine; Medical Oncology  [address] [place] [address]  Phone: [phone]  Fax: [phone]

## 2018-03-30 LAB
IGA FLD-MCNC: 157 MG/DL — SIGNIFICANT CHANGE UP (ref 68–378)
IGG FLD-MCNC: 638 MG/DL — LOW (ref 694–1618)
IGM SERPL-MCNC: 1870 MG/DL — HIGH (ref 40–230)
KAPPA LC SER QL IFE: 14 MG/DL — HIGH (ref 0.33–1.94)
KAPPA/LAMBDA FREE LIGHT CHAIN RATIO, SERUM: 8.81 RATIO — HIGH (ref 0.26–1.65)
LAMBDA LC SER QL IFE: 1.59 MG/DL — SIGNIFICANT CHANGE UP (ref 0.57–2.63)

## 2018-03-30 NOTE — PROGRESS NOTE ADULT - PROVIDER SPECIALTY LIST ADULT
Heme/Onc
Internal Medicine

## 2018-03-30 NOTE — PROGRESS NOTE ADULT - SUBJECTIVE AND OBJECTIVE BOX
GALA PHILLIPS 76yo W Female came to the ER for c/o inc SOB that started about 1 mo ago and has been getting progressively worse especially on exertion.  The pt denies CP/palp/ fever/chills or leg edema.  The pt had CT of the chest which was negative for PE. The PMHx includes NHL/ lowgrade followed by Dr Jackson.  Her base line WBC is about 30, also thrombocytopenia with baseline values of 70-80.  On this admission the WBC is 70, 50, 63 and the Plts are decreased to 30, 28. Pt is transferred to the [facility] for further Hem/onc workup.  Pt had Bone Marrow biopsy today    INTERVAL HPI/OVERNIGHT EVENTS: no significant /untoward evnts    MEDICATIONS  (STANDING):    MEDICATIONS  (PRN):      Allergies:  PCN    Vital Signs Last 24 Hrs    T(F): 97.3  HR: 71   BP: 119/56    RR: 18  SpO2: 97%     PHYSICAL EXAM:      Constitutional:  pt is alert and oriented x3, in NAD    Eyes: normal    ENMT:  dry oral mucosa    Neck:  supple,     Back:  no tenderness on palpation    Respiratory:  decreased BS, scattered rhonchi, no wheezing    Cardiovascular:  S1S2    Gastrointestinal:  soft and benign, + BS    Extremities:  moves all ext, goo mm mass and tone, no calf tenderness    Neurological:  intact    Skin:  no rash, ecchymosis or petechiae        LABS:                        9.9    63 )-----------( 28       ( WBC 71, Plts 33, H/H  11/34)              29.4    03-25    138  |  97   |  14  ----------------------------<  110  3.9   |  29  |  0.7    Ca    8.0<L>      25 Mar 2018 06:25    TPro  7.4  /  Alb  4.0  /  TBili  1.4<H>  /  DBili  x   /  AST  27  /  ALT  15  /  AlkPhos  166<H>  03-24    CK 16, CKMB < 0.1,  trop < 0.01      RADIOLOGY & ADDITIONAL TESTS:  CXR  basilar atelectasis  CT of the chest:  groundglass stable 9 mm RLL lesion, sm effusion, small subcm mediastinal LN, NO PE  EKG NSR 80/min, occ PVC no acute changes
SUBJECTIVE:    Patient is a 77y old Female who presents with a chief complaint of Dyspnea x 1 month (24 Mar 2018 17:18)    Currently admitted to medicine with the primary diagnosis of Dyspnea     Today is hospital day 2d. This morning she is resting comfortably in bed and reports no new issues or overnight events. She says that she feels her shortness of breath has improved dramatically     PAST MEDICAL & SURGICAL HISTORY  Non-Hodgkins lymphoma  S/P total abdominal hysterectomy    SOCIAL HISTORY:  Negative for smoking/alcohol/drug use.     ALLERGIES:  penicillin (Unknown)    MEDICATIONS:  STANDING MEDICATIONS    PRN MEDICATIONS    VITALS:   T(F): 98.3  HR: 77  BP: 135/60  RR: 19  SpO2: 94%    LABS:                        9.9    63.76 )-----------( 28       ( 26 Mar 2018 11:56 )             29.4     03-26    138  |  97<L>  |  14  ----------------------------<  110<H>  3.9   |  29  |  0.7    Ca    8.3<L>      26 Mar 2018 11:56    TPro  6.7  /  Alb  3.6  /  TBili  1.2  /  DBili  x   /  AST  21  /  ALT  11  /  AlkPhos  123<H>  03-26        CARDIAC MARKERS ( 25 Mar 2018 06:25 )  x     / <0.01 ng/mL / 16 U/L / x     / <0.1 ng/mL  CARDIAC MARKERS ( 24 Mar 2018 21:51 )  x     / <0.01 ng/mL / 14 U/L / x     / <0.1 ng/mL      RADIOLOGY:    PHYSICAL EXAM:  GEN: No acute distress  LUNGS: Clear to auscultation bilaterally   HEART: S1/S2 present. RRR.   ABD: Soft, non-tender, non-distended. Bowel sounds present  EXT: NC/NC/NE/2+PP/CHAPMAN  NEURO: AAOX3
GALA PHILLIPS 76yo W Female came to the ER for c/o inc SOB that started about 1 mo ago and has been getting progressively worse especially on exertion.  The pt denies CP/palp/ fever/chills or leg edema.  The pt had CT of the chest which was negative for PE. The PMHx includes NHL/ lowgrade followed by Dr Jackson.  Her base line WBC is about 30, also thrombocytopenia with baseline values of 70-80.  On this admission the WBC is 70, 50, 63 and the Plts are decreased to 30, 28. Pt is transferred to the [facility] for further Hem/onc workup.  Pt had Bone Marrow biopsy 3/26.     INTERVAL HPI/OVERNIGHT EVENTS: pt c/o dysuria and urinary pressure, U/a +. C&S P, started on macrobid, sp BM bx yesterday results P, ECHO and cardio consult P  MEDICATIONS  (STANDING):    MEDICATIONS  (PRN):      Allergies:  PCN    Vital Signs Last 24 Hrs    T(F): 96.1  HR: 70   BP: 132/62    RR: 17  SpO2: 96%     PHYSICAL EXAM:      Constitutional:  pt is alert and oriented x3, in NAD    Eyes: normal    ENMT:  dry oral mucosa    Neck:  supple,     Back:  no tenderness on palpation    Respiratory:  decreased BS, scattered rhonchi, no wheezing    Cardiovascular:  S1S2    Gastrointestinal:  soft and benign, + BS    Extremities:  moves all ext, goo mm mass and tone, no calf tenderness    Neurological:  intact    Skin:  no rash, ecchymosis or petechiae        LABS:                        9.6   55 )-----------( 30       ( WBC 71, Plts 33, H/H  11/34)              28    135  |  94   |  11  ----------------------------<  155  3.7   |  30  |  0.8    Ca    8.0<L>      25 Mar 2018 06:25    TPro  7.4  /  Alb  4.0  /  TBili  1.4<H>  /  DBili  x   /  AST  27  /  ALT  15  /  AlkPhos  166<H>  03-24    CK 16, CKMB < 0.1,  trop < 0.01    u/a + wNBCs and + bacteria  RADIOLOGY & ADDITIONAL TESTS:  CXR  basilar atelectasis  CT of the chest:  groundglass stable 9 mm RLL lesion, sm effusion, small subcm mediastinal LN, NO PE  EKG NSR 80/min, occ PVC no acute changes
GALA PHILLIPS 78yo W Female came to the ER for c/o inc SOB that started about 1 mo ago and has been getting progressively worse especially on exertion.  The pt denies CP/palp/ fever/chills or leg edema.  The pt had CT of the chest which was negative for PE. The PMHx includes NHL/ lowgrade followed by Dr Jackson.  Her base line WBC is about 30, also thrombocytopenia with baseline values of 70-80.  On this admission the WBC id 70, 50 and the Plts are decreased to 30. Pt to be transferred to the [facility].  Will need further work up including bone marrow biopsy.    INTERVAL HPI/OVERNIGHT EVENTS:    MEDICATIONS  (STANDING):    MEDICATIONS  (PRN):      Allergies    penicillin   	    Vital Signs Last 24 Hrs  T(C): 37.3 (25 Mar 2018 06:19), Max: 37.3 (25 Mar 2018 06:19)  T(F): 99.2 (25 Mar 2018 06:19), Max: 99.2 (25 Mar 2018 06:19)  HR: 71 (25 Mar 2018 06:19) (71 - 81)  BP: 141/63 (25 Mar 2018 06:19) (139/77 - 142/67)  BP(mean): --  RR: 16 (25 Mar 2018 06:19) (16 - 18)  SpO2: 97% (24 Mar 2018 18:45) (97% - 97%)    PHYSICAL EXAM:      Constitutional:  pt is alert and oriented x3, in NAD    Eyes: normal    ENMT:  dry oral mucosa    Neck:  supple,     Back:  no tenderness o palpation    Respiratory:  decreased TONY, scattered rhonchi, no wheezing    Cardiovascular:  S1S2    Gastrointestinal:  soft and benign, + BS    Extremities:  moves all ext, goo mm mass and tone, no calf tenderness    Neurological:  intact    Skin:  no rash, ecchymosis or laceraation        LABS:                        9.9    55.62 )-----------( 31       ( WBC 71, Plts 33, H/H  11/34)              28.6     03-25    137  |  94<L>  |  16  ----------------------------<  100<H>  3.4<L>   |  29  |  0.8    Ca    8.0<L>      25 Mar 2018 06:25    TPro  7.4  /  Alb  4.0  /  TBili  1.4<H>  /  DBili  x   /  AST  27  /  ALT  15  /  AlkPhos  166<H>  03-24    CK 16, CKMB < 0.1,  trop < 0.01      RADIOLOGY & ADDITIONAL TESTS:  CXR  basilar atelectasis  CT of the chest:  groundglass stable 9 mm RLL lesion, sm effusion, small subcm mediastinal LN, NO PE  EKG NSR 80/min, occ PVC no acute changes
GALA PHILLIPS 78yo W Female came to the ER for c/o inc SOB that started about 1 mo ago and has been getting progressively worse especially on exertion.  The pt denies CP/palp/ fever/chills or leg edema.  The pt had CT of the chest which was negative for PE. The PMHx includes NHL/ lowgrade followed by Dr Jackson.  Her base line WBC is about 30, also thrombocytopenia with baseline values of 70-80.  On this admission the WBC is 70, 50, 63 and the Plts are decreased to 30, 28. Pt is transferred to the [facility] for further Hem/onc workup.  Pt had Bone Marrow biopsy 3/26.  Pt also had sono of abd  which showed hepatosplenomegaly,. The ECHO is essentially normal with and EF of 60-65%.  The pt is being tx for a UTI.  The pat is awaiting results of the Bone Marrow Bx to determine further  Hem/Onc treatment.    INTERVAL HPI/OVERNIGHT EVENTS:  no significant untoward events  MEDICATIONS  (STANDING):  Macrodantin    MEDICATIONS  (PRN):      Allergies:  PCN    Vital Signs Last 24 Hrs    T(F): 97.6  HR: 69   BP: 139/70    RR: 18  SpO2: 96%     PHYSICAL EXAM:      Constitutional:  pt is alert and oriented x3, in NAD    Eyes: normal    ENMT:  dry oral mucosa    Neck:  supple,     Back:  no tenderness on palpation    Respiratory:  decreased BS, scattered rhonchi, no wheezing    Cardiovascular:  S1S2    Gastrointestinal:  soft and benign, + BS    Extremities:  moves all ext, goo mm mass and tone, no calf tenderness    Neurological:  intact    Skin:  no rash, ecchymosis or petechiae        LABS:                        9.8   49 )-----------( 28       ( WBC 71, Plts 33, H/H  11/34)              29    144  |  101   |  10  ----------------------------<  168  4.1   |  27  |  0.8    Ca    8.0<L>      25 Mar 2018 06:25    TPro  7.4  /  Alb  4.0  /  TBili  1.4<H>  /  DBili  x   /  AST  27  /  ALT  15  /  AlkPhos  166<H>  03-24    CK 16, CKMB < 0.1,  trop < 0.01  , 351  Hep B neg, HepC neg    u/a + WNBCs and + bacteria  RADIOLOGY & ADDITIONAL TESTS:    CXR  basilar atelectasis    CT of the chest:  groundglass stable 9 mm RLL lesion, sm effusion, small subcm mediastinal LN, NO PE    SONO of Abd:  hepatosplenomegaly, liver 20cm, spleen 18cm  ECHO: normal -65%, no significant valvular dis, no pericardial effusion   EKG NSR 80/min, occ PVC no acute changes
GALA PHILLIPS 78yo W Female came to the ER for c/o inc SOB that started about 1 mo ago and has been getting progressively worse especially on exertion.  The pt denies CP/palp/ fever/chills or leg edema.  The pt had CT of the chest which was negative for PE. The PMHx includes NHL/ lowgrade followed by Dr Jackson.  Her base line WBC is about 30, also thrombocytopenia with baseline values of 70-80.  On this admission the WBC is 70, 50, 63 and the Plts are decreased to 30, 28. Pt is transferred to the [facility] for further Hem/onc workup.  Pt had Bone Marrow biopsy 3/26.  Pt also had sono of abd  which showed hepatosplenomegaly,. The ECHO is essentially normal with and EF of 60-65%.  The pt is being tx for cystitis with Macrodantin.   The pt is awaiting results of the Bone Marrow Bx to determine further  Hem/Onc treatment. CBC being monitored daily.    INTERVAL HPI/OVERNIGHT EVENTS:  no significant untoward events, pt states SOB is improving and suprapubic discomfort is better  MEDICATIONS  (STANDING):  Macrodantin    MEDICATIONS  (PRN):      Allergies:  PCN    Vital Signs Last 24 Hrs    T(F): 98.1  HR: 72   BP: 133/63    RR: 16  SpO2: 96%     PHYSICAL EXAM:      Constitutional:  pt is alert and oriented x3, in NAD, ambulatory    Eyes: normal    ENMT:  dry oral mucosa    Neck:  supple,     Back:  no tenderness on palpation    Respiratory:  decreased BS, scattered rhonchi, no wheezing    Cardiovascular:  S1S2    Gastrointestinal:  soft and benign, + BS    Extremities:  moves all ext, goo mm mass and tone, no calf tenderness    Neurological:  intact    Skin:  no rash, ecchymosis or petechiae        LABS:                        8.5   47 )-----------( 29      ( WBC 71, Plts 33, H/H  11/34)              25    140  |  101   |  10  ----------------------------<  168  3.5   |  28  |  0.7    Ca    8.0<L>      25 Mar 2018 06:25    TPro  7.4  /  Alb  4.0  /  TBili  1.4<H>  /  DBili  x   /  AST  27  /  ALT  15  /  AlkPhos  166<H>  03-24    CK 16, CKMB < 0.1,  trop < 0.01  , 351, 313  Hep A neg,Hep B neg, HepC neg  HIV neg  CMV neg  u/a + WNBCs and + bacteria    RADIOLOGY & ADDITIONAL TESTS:    CXR  basilar atelectasis    CT of the chest:  groundglass stable 9 mm RLL lesion, sm effusion, small subcm mediastinal LN, NO PE    SONO of Abd:  hepatosplenomegaly, liver 20cm, spleen 18cm  ECHO: normal -65%, no significant valvular dis, no pericardial effusion   EKG NSR 80/min, occ PVC no acute changes
SUBJECTIVE:    Patient is a 77y old Female who presents with a chief complaint of Dyspnea x 1 month (24 Mar 2018 17:18)    Currently admitted to medicine with the primary diagnosis of Dyspnea     Today is hospital day 3d. This morning she is resting comfortably in bed and reports no new issues or overnight events.     PAST MEDICAL & SURGICAL HISTORY  Non-Hodgkins lymphoma  S/P total abdominal hysterectomy    SOCIAL HISTORY:  Negative for smoking/alcohol/drug use.     ALLERGIES:  penicillin (Unknown)    MEDICATIONS:  STANDING MEDICATIONS  nitrofurantoin (MACROBID) 100 milliGRAM(s) Oral two times a day with meals    PRN MEDICATIONS    VITALS:   T(F): 98.7  HR: 67  BP: 123/57  RR: 18  SpO2: 96%    LABS:                        9.6    55.92 )-----------( 30       ( 27 Mar 2018 10:22 )             28.4         138  |  97<L>  |  14  ----------------------------<  110<H>  3.9   |  29  |  0.7    Ca    8.3<L>      26 Mar 2018 11:56    TPro  6.7  /  Alb  3.6  /  TBili  1.2  /  DBili  x   /  AST  21  /  ALT  11  /  AlkPhos  123<H>      PT/INR - ( 27 Mar 2018 10:22 )   PT: 17.20 sec;   INR: 1.58 ratio           PTT - ( 27 Mar 2018 10:22 )  PTT:29.5 sec  Urinalysis Basic - ( 27 Mar 2018 09:01 )    Color: Yellow / Appearance: Cloudy / S.015 / pH: x  Gluc: x / Ketone: Negative  / Bili: Negative / Urobili: 1.0 mg/dL   Blood: x / Protein: 30 mg/dL / Nitrite: Negative   Leuk Esterase: Moderate / RBC: 10-25 /HPF / WBC 10-25 /HPF   Sq Epi: x / Non Sq Epi: x / Bacteria: Moderate /HPF                RADIOLOGY:    PHYSICAL EXAM:  GEN: No acute distress  LUNGS: Clear to auscultation bilaterally   HEART: S1/S2 present. RRR.   ABD: Soft, non-tender, non-distended. Bowel sounds present  EXT: NC/NC/NE/2+PP/CHAPMAN  NEURO: AAOX3
SUBJECTIVE:    Patient is a 77y old Female who presents with a chief complaint of Dyspnea x 1 month (24 Mar 2018 17:18)    Currently admitted to medicine with the primary diagnosis of Dyspnea     Today is hospital day 4d. This morning she is resting comfortably in bed and reports no new issues or overnight events.     PAST MEDICAL & SURGICAL HISTORY  Non-Hodgkins lymphoma  S/P total abdominal hysterectomy    SOCIAL HISTORY:  Negative for smoking/alcohol/drug use.     ALLERGIES:  penicillin (Unknown)    MEDICATIONS:  STANDING MEDICATIONS  nitrofurantoin (MACROBID) 100 milliGRAM(s) Oral two times a day with meals    PRN MEDICATIONS    VITALS:   T(F): 97.1  HR: 69  BP: 139/70  RR: 18  SpO2: --    LABS:                        9.8    49.17 )-----------(        ( 28 Mar 2018 10:33 )             29.4     -    144  |  101  |  10  ----------------------------<  168<H>  4.1   |  27  |  0.8    Ca    8.4<L>      28 Mar 2018 10:33    TPro  6.8  /  Alb  3.4<L>  /  TBili  1.0  /  DBili  x   /  AST  19  /  ALT  8   /  AlkPhos  106      PT/INR - ( 27 Mar 2018 10:22 )   PT: 17.20 sec;   INR: 1.58 ratio         PTT - ( 27 Mar 2018 10:22 )  PTT:29.5 sec  Urinalysis Basic - ( 27 Mar 2018 09:01 )    Color: Yellow / Appearance: Cloudy / S.015 / pH: x  Gluc: x / Ketone: Negative  / Bili: Negative / Urobili: 1.0 mg/dL   Blood: x / Protein: 30 mg/dL / Nitrite: Negative   Leuk Esterase: Moderate / RBC: 10-25 /HPF / WBC 10-25 /HPF   Sq Epi: x / Non Sq Epi: x / Bacteria: Moderate /HPF        RADIOLOGY:    PHYSICAL EXAM:  GEN: No acute distress  LUNGS: Clear to auscultation bilaterally   HEART: S1/S2 present. RRR.   ABD: Soft, non-tender, non-distended. Bowel sounds present  EXT: NC/NC/NE/2+PP/CHAPMAN  NEURO: AAOX3
SUBJECTIVE:    Patient is a 77y old Female who presents with a chief complaint of Dyspnea x 1 month (24 Mar 2018 17:18)    Currently admitted to medicine with the primary diagnosis of Dyspnea     Today is hospital day 5d. This morning she is resting comfortably in a chair. No active issues.     PAST MEDICAL & SURGICAL HISTORY  Non-Hodgkins lymphoma  S/P total abdominal hysterectomy    SOCIAL HISTORY:  Negative for smoking/alcohol/drug use.     ALLERGIES:  penicillin (Unknown)    MEDICATIONS:  STANDING MEDICATIONS  nitrofurantoin 100 milliGRAM(s) Oral <User Schedule>    PRN MEDICATIONS    VITALS:   T(F): 97.7  HR: 70  BP: 128/61  RR: 18  SpO2: 96%    LABS:                        8.5    47.71 )-----------( 29       ( 29 Mar 2018 07:53 )             25.7     03-29    140  |  100  |  8<L>  ----------------------------<  109<H>  3.5   |  28  |  0.7    Ca    8.0<L>      29 Mar 2018 07:53    TPro  6.0  /  Alb  3.1<L>  /  TBili  0.6  /  DBili  x   /  AST  16  /  ALT  7   /  AlkPhos  96  03-29      Culture - Urine (collected 27 Mar 2018 09:01)  Source: .Urine Clean Catch (Midstream)  Final Report (28 Mar 2018 21:17):    <10,000 CFU/ml Normal Urogenital barry present      RADIOLOGY:    PHYSICAL EXAM:  General: well developed, well nourished, NAD  Neuro: alert and oriented, no focal deficits, moves all extremities spontaneously  HEENT: NCAT, EOMI, anicteric, mucosa moist  Respiratory: CTABL  CVS: regular rate and rhythm  Abdomen: soft, nontender, nondistended  Extremities: no edema, sensation and movement grossly intact  Skin: warm, dry, appropriate color
The patient examined on March 29 prior to discharge , CBC shows drop in Hb , platelets are stable , no evidence of acute blood loss . She denies any change in breathing , complains only of mild fatigue. imaging showed hepatosplenomegaly , bone marrow was suboptimal but show no evidence of lymphoma or myeloma . Increase in myeloid and monocytes without increase in blasts.  Flow is negative , cytogenetics and FISH are pending . Clinical picture most consistent with CMML . will follow -up as outpatient next week . will check ferritin EPO level .                         8.5    47.71 )-----------( 29       ( 29 Mar 2018 07:53 )             25.7             03-29    140  |  100  |  8<L>  ----------------------------<  109<H>  3.5   |  28  |  0.7    Ca    8.0<L>      29 Mar 2018 07:53    TPro  6.0  /  Alb  3.1<L>  /  TBili  0.6  /  DBili  x   /  AST  16  /  ALT  7   /  AlkPhos  96  03-29    LIVER FUNCTIONS - ( 29 Mar 2018 07:53 )  Alb: 3.1 g/dL / Pro: 6.0 g/dL / ALK PHOS: 96 U/L / ALT: 7 U/L / AST: 16 U/L / GGT: x

## 2018-03-31 LAB
% ALBUMIN: 47.9 % — SIGNIFICANT CHANGE UP
% ALPHA 1: 6.1 % — SIGNIFICANT CHANGE UP
% ALPHA 2: 10.1 % — SIGNIFICANT CHANGE UP
% BETA: 9.7 % — SIGNIFICANT CHANGE UP
% GAMMA: 26.2 % — SIGNIFICANT CHANGE UP
% M SPIKE: 21.7 % — SIGNIFICANT CHANGE UP
ALBUMIN SERPL ELPH-MCNC: 3.5 G/DL — LOW (ref 3.6–5.5)
ALBUMIN/GLOB SERPL ELPH: 0.9 RATIO — SIGNIFICANT CHANGE UP
ALPHA1 GLOB SERPL ELPH-MCNC: 0.4 G/DL — SIGNIFICANT CHANGE UP (ref 0.1–0.4)
ALPHA2 GLOB SERPL ELPH-MCNC: 0.7 G/DL — SIGNIFICANT CHANGE UP (ref 0.5–1)
B-GLOBULIN SERPL ELPH-MCNC: 0.7 G/DL — SIGNIFICANT CHANGE UP (ref 0.5–1)
GAMMA GLOBULIN: 1.9 G/DL — HIGH (ref 0.6–1.6)
INTERPRETATION SERPL IFE-IMP: SIGNIFICANT CHANGE UP
M-SPIKE: 1.6 G/DL — HIGH (ref 0–0)
PROT PATTERN SERPL ELPH-IMP: SIGNIFICANT CHANGE UP
PROT SERPL-MCNC: 7.3 G/DL — SIGNIFICANT CHANGE UP (ref 6–8.3)
PROT SERPL-MCNC: 7.3 G/DL — SIGNIFICANT CHANGE UP (ref 6–8.3)

## 2018-04-02 ENCOUNTER — LABORATORY RESULT (OUTPATIENT)
Age: 78
End: 2018-04-02

## 2018-04-02 ENCOUNTER — OTHER (OUTPATIENT)
Age: 78
End: 2018-04-02

## 2018-04-02 ENCOUNTER — APPOINTMENT (OUTPATIENT)
Dept: HEMATOLOGY ONCOLOGY | Facility: CLINIC | Age: 78
End: 2018-04-02

## 2018-04-02 VITALS
SYSTOLIC BLOOD PRESSURE: 147 MMHG | WEIGHT: 148 LBS | HEIGHT: 62 IN | TEMPERATURE: 98.7 F | BODY MASS INDEX: 27.23 KG/M2 | DIASTOLIC BLOOD PRESSURE: 72 MMHG | HEART RATE: 84 BPM | RESPIRATION RATE: 14 BRPM

## 2018-04-02 DIAGNOSIS — Z87.891 PERSONAL HISTORY OF NICOTINE DEPENDENCE: ICD-10-CM

## 2018-04-02 DIAGNOSIS — K21.9 GASTRO-ESOPHAGEAL REFLUX DISEASE WITHOUT ESOPHAGITIS: ICD-10-CM

## 2018-04-02 DIAGNOSIS — M19.90 UNSPECIFIED OSTEOARTHRITIS, UNSPECIFIED SITE: ICD-10-CM

## 2018-04-02 DIAGNOSIS — C85.89 OTHER SPECIFIED TYPES OF NON-HODGKIN LYMPHOMA, EXTRANODAL AND SOLID ORGAN SITES: ICD-10-CM

## 2018-04-02 DIAGNOSIS — R06.00 DYSPNEA, UNSPECIFIED: ICD-10-CM

## 2018-04-02 DIAGNOSIS — N39.0 URINARY TRACT INFECTION, SITE NOT SPECIFIED: ICD-10-CM

## 2018-04-02 DIAGNOSIS — J44.9 CHRONIC OBSTRUCTIVE PULMONARY DISEASE, UNSPECIFIED: ICD-10-CM

## 2018-04-02 DIAGNOSIS — Z90.710 ACQUIRED ABSENCE OF BOTH CERVIX AND UTERUS: ICD-10-CM

## 2018-04-02 DIAGNOSIS — C93.10 CHRONIC MYELOMONOCYTIC LEUKEMIA NOT HAVING ACHIEVED REMISSION: ICD-10-CM

## 2018-04-02 PROBLEM — C85.90 NON-HODGKIN LYMPHOMA, UNSPECIFIED, UNSPECIFIED SITE: Chronic | Status: ACTIVE | Noted: 2018-03-24

## 2018-04-02 LAB
HCT VFR BLD CALC: 33.1 %
HGB BLD-MCNC: 10.5 G/DL
MCHC RBC-ENTMCNC: 27.3 PG
MCHC RBC-ENTMCNC: 31.7 G/DL
MCV RBC AUTO: 86.2 FL
PLATELET # BLD AUTO: 31 K/UL
PMV BLD: NORMAL
RBC # BLD: 3.84 M/UL
RBC # FLD: 25.3 %
WBC # FLD AUTO: 42.13 K/UL

## 2018-04-03 ENCOUNTER — APPOINTMENT (OUTPATIENT)
Dept: HEMATOLOGY ONCOLOGY | Facility: CLINIC | Age: 78
End: 2018-04-03

## 2018-04-03 ENCOUNTER — RESULT REVIEW (OUTPATIENT)
Age: 78
End: 2018-04-03

## 2018-04-03 VITALS
BODY MASS INDEX: 27.23 KG/M2 | HEART RATE: 80 BPM | WEIGHT: 148 LBS | SYSTOLIC BLOOD PRESSURE: 126 MMHG | TEMPERATURE: 97.9 F | RESPIRATION RATE: 16 BRPM | DIASTOLIC BLOOD PRESSURE: 68 MMHG | HEIGHT: 62 IN

## 2018-04-03 LAB
ALBUMIN SERPL ELPH-MCNC: 3.9 G/DL
ALP BLD-CCNC: 114 U/L
ALT SERPL-CCNC: 7 U/L
ANION GAP SERPL CALC-SCNC: 16 MMOL/L
AST SERPL-CCNC: 18 U/L
BCR/ABL BY RT - PCR QUANTITATIVE: SIGNIFICANT CHANGE UP
BILIRUB SERPL-MCNC: 0.6 MG/DL
BUN SERPL-MCNC: 6 MG/DL
CALCIUM SERPL-MCNC: 8.7 MG/DL
CHLORIDE SERPL-SCNC: 99 MMOL/L
CO2 SERPL-SCNC: 28 MMOL/L
CREAT SERPL-MCNC: 1 MG/DL
FERRITIN SERPL-MCNC: 377 NG/ML
GLUCOSE SERPL-MCNC: 128 MG/DL
JAK2 P.V617F BLD/T QL: SIGNIFICANT CHANGE UP
POTASSIUM SERPL-SCNC: 3.7 MMOL/L
PROT SERPL-MCNC: 7.9 G/DL
SODIUM SERPL-SCNC: 143 MMOL/L
VIT B12 SERPL-MCNC: 1302 PG/ML

## 2018-04-03 RX ORDER — ONDANSETRON 8 MG/1
8 TABLET ORAL
Qty: 30 | Refills: 3 | Status: ACTIVE | COMMUNITY
Start: 2018-04-03 | End: 1900-01-01

## 2018-04-03 RX ORDER — PROCHLORPERAZINE MALEATE 10 MG/1
10 TABLET ORAL EVERY 6 HOURS
Qty: 30 | Refills: 3 | Status: ACTIVE | COMMUNITY
Start: 2018-04-03 | End: 1900-01-01

## 2018-04-04 LAB — EPO SERPL-MCNC: 40.4 MIU/ML

## 2018-04-09 ENCOUNTER — APPOINTMENT (OUTPATIENT)
Dept: INFUSION THERAPY | Facility: CLINIC | Age: 78
End: 2018-04-09

## 2018-04-09 ENCOUNTER — LABORATORY RESULT (OUTPATIENT)
Age: 78
End: 2018-04-09

## 2018-04-09 LAB
CHROM ANALY OVERALL INTERP SPEC-IMP: SIGNIFICANT CHANGE UP
HCT VFR BLD CALC: 28.3 %
HEMATOPATHOLOGY REPORT: SIGNIFICANT CHANGE UP
HGB BLD-MCNC: 9.5 G/DL
MCHC RBC-ENTMCNC: 27.5 PG
MCHC RBC-ENTMCNC: 33.6 G/DL
MCV RBC AUTO: 81.8 FL
PLATELET # BLD AUTO: 28 K/UL
PMV BLD: NORMAL
RBC # BLD: 3.46 M/UL
RBC # FLD: 25 %
WBC # FLD AUTO: 46.63 K/UL

## 2018-04-09 RX ORDER — AZACITIDINE FOR 100 MG/1
125 INJECTION, POWDER, LYOPHILIZED, FOR SOLUTION INTRAVENOUS; SUBCUTANEOUS ONCE
Qty: 0 | Refills: 0 | Status: COMPLETED | OUTPATIENT
Start: 2018-04-09 | End: 2018-04-09

## 2018-04-09 RX ORDER — ONDANSETRON 8 MG/1
8 TABLET, FILM COATED ORAL ONCE
Qty: 0 | Refills: 0 | Status: COMPLETED | OUTPATIENT
Start: 2018-04-09 | End: 2018-04-09

## 2018-04-09 RX ADMIN — AZACITIDINE FOR 125 MILLIGRAM(S): 100 INJECTION, POWDER, LYOPHILIZED, FOR SOLUTION INTRAVENOUS; SUBCUTANEOUS at 12:01

## 2018-04-09 RX ADMIN — ONDANSETRON 8 MILLIGRAM(S): 8 TABLET, FILM COATED ORAL at 12:01

## 2018-04-10 ENCOUNTER — APPOINTMENT (OUTPATIENT)
Dept: INFUSION THERAPY | Facility: CLINIC | Age: 78
End: 2018-04-10

## 2018-04-10 RX ORDER — AZACITIDINE FOR 100 MG/1
125 INJECTION, POWDER, LYOPHILIZED, FOR SOLUTION INTRAVENOUS; SUBCUTANEOUS ONCE
Qty: 0 | Refills: 0 | Status: COMPLETED | OUTPATIENT
Start: 2018-04-10 | End: 2018-04-10

## 2018-04-10 RX ORDER — ONDANSETRON 8 MG/1
8 TABLET, FILM COATED ORAL ONCE
Qty: 0 | Refills: 0 | Status: COMPLETED | OUTPATIENT
Start: 2018-04-10 | End: 2018-04-10

## 2018-04-10 RX ADMIN — AZACITIDINE FOR 125 MILLIGRAM(S): 100 INJECTION, POWDER, LYOPHILIZED, FOR SOLUTION INTRAVENOUS; SUBCUTANEOUS at 13:38

## 2018-04-10 RX ADMIN — ONDANSETRON 8 MILLIGRAM(S): 8 TABLET, FILM COATED ORAL at 13:11

## 2018-04-11 ENCOUNTER — APPOINTMENT (OUTPATIENT)
Dept: INFUSION THERAPY | Facility: CLINIC | Age: 78
End: 2018-04-11

## 2018-04-11 RX ORDER — ONDANSETRON 8 MG/1
8 TABLET, FILM COATED ORAL ONCE
Qty: 0 | Refills: 0 | Status: COMPLETED | OUTPATIENT
Start: 2018-04-11 | End: 2018-04-11

## 2018-04-11 RX ORDER — AZACITIDINE FOR 100 MG/1
125 INJECTION, POWDER, LYOPHILIZED, FOR SOLUTION INTRAVENOUS; SUBCUTANEOUS ONCE
Qty: 0 | Refills: 0 | Status: COMPLETED | OUTPATIENT
Start: 2018-04-11 | End: 2018-04-11

## 2018-04-11 RX ADMIN — ONDANSETRON 8 MILLIGRAM(S): 8 TABLET, FILM COATED ORAL at 11:20

## 2018-04-11 RX ADMIN — AZACITIDINE FOR 125 MILLIGRAM(S): 100 INJECTION, POWDER, LYOPHILIZED, FOR SOLUTION INTRAVENOUS; SUBCUTANEOUS at 11:50

## 2018-04-12 ENCOUNTER — LABORATORY RESULT (OUTPATIENT)
Age: 78
End: 2018-04-12

## 2018-04-12 ENCOUNTER — APPOINTMENT (OUTPATIENT)
Dept: INFUSION THERAPY | Facility: CLINIC | Age: 78
End: 2018-04-12

## 2018-04-12 LAB
HCT VFR BLD CALC: 27 %
HGB BLD-MCNC: 9.4 G/DL
MCHC RBC-ENTMCNC: 27.4 PG
MCHC RBC-ENTMCNC: 34.8 G/DL
MCV RBC AUTO: 78.7 FL
PLATELET # BLD AUTO: 36 K/UL
PMV BLD: NORMAL
RBC # BLD: 3.43 M/UL
RBC # FLD: 25.1 %
WBC # FLD AUTO: 70.49 K/UL

## 2018-04-12 RX ORDER — ONDANSETRON 8 MG/1
8 TABLET, FILM COATED ORAL ONCE
Qty: 0 | Refills: 0 | Status: COMPLETED | OUTPATIENT
Start: 2018-04-12 | End: 2018-04-12

## 2018-04-12 RX ORDER — AZACITIDINE FOR 100 MG/1
125 INJECTION, POWDER, LYOPHILIZED, FOR SOLUTION INTRAVENOUS; SUBCUTANEOUS ONCE
Qty: 0 | Refills: 0 | Status: COMPLETED | OUTPATIENT
Start: 2018-04-12 | End: 2018-04-12

## 2018-04-12 RX ADMIN — AZACITIDINE FOR 125 MILLIGRAM(S): 100 INJECTION, POWDER, LYOPHILIZED, FOR SOLUTION INTRAVENOUS; SUBCUTANEOUS at 12:12

## 2018-04-12 RX ADMIN — ONDANSETRON 8 MILLIGRAM(S): 8 TABLET, FILM COATED ORAL at 11:52

## 2018-04-13 ENCOUNTER — APPOINTMENT (OUTPATIENT)
Dept: INFUSION THERAPY | Facility: CLINIC | Age: 78
End: 2018-04-13

## 2018-04-13 LAB — HEMATOPATHOLOGY REPORT: SIGNIFICANT CHANGE UP

## 2018-04-13 RX ORDER — ONDANSETRON 8 MG/1
8 TABLET, FILM COATED ORAL ONCE
Qty: 0 | Refills: 0 | Status: COMPLETED | OUTPATIENT
Start: 2018-04-13 | End: 2018-04-13

## 2018-04-13 RX ORDER — AZACITIDINE FOR 100 MG/1
125 INJECTION, POWDER, LYOPHILIZED, FOR SOLUTION INTRAVENOUS; SUBCUTANEOUS ONCE
Qty: 0 | Refills: 0 | Status: COMPLETED | OUTPATIENT
Start: 2018-04-13 | End: 2018-04-13

## 2018-04-13 RX ADMIN — ONDANSETRON 8 MILLIGRAM(S): 8 TABLET, FILM COATED ORAL at 11:28

## 2018-04-13 RX ADMIN — AZACITIDINE FOR 125 MILLIGRAM(S): 100 INJECTION, POWDER, LYOPHILIZED, FOR SOLUTION INTRAVENOUS; SUBCUTANEOUS at 11:42

## 2018-04-16 ENCOUNTER — OTHER (OUTPATIENT)
Age: 78
End: 2018-04-16

## 2018-04-16 ENCOUNTER — LABORATORY RESULT (OUTPATIENT)
Age: 78
End: 2018-04-16

## 2018-04-16 ENCOUNTER — APPOINTMENT (OUTPATIENT)
Dept: INFUSION THERAPY | Facility: CLINIC | Age: 78
End: 2018-04-16

## 2018-04-16 ENCOUNTER — APPOINTMENT (OUTPATIENT)
Dept: HEMATOLOGY ONCOLOGY | Facility: CLINIC | Age: 78
End: 2018-04-16

## 2018-04-16 LAB
HCT VFR BLD CALC: 23.4 %
HGB BLD-MCNC: 8.3 G/DL
MCHC RBC-ENTMCNC: 27.2 PG
MCHC RBC-ENTMCNC: 35.5 G/DL
MCV RBC AUTO: 76.7 FL
PLATELET # BLD AUTO: 25 K/UL
PMV BLD: NORMAL
RBC # BLD: 3.05 M/UL
RBC # FLD: 25.3 %
WBC # FLD AUTO: 49.15 K/UL

## 2018-04-16 RX ORDER — ALLOPURINOL 300 MG/1
300 TABLET ORAL DAILY
Qty: 5 | Refills: 0 | Status: ACTIVE | COMMUNITY
Start: 2017-07-28 | End: 1900-01-01

## 2018-04-16 RX ORDER — ERYTHROPOIETIN 10000 [IU]/ML
40000 INJECTION, SOLUTION INTRAVENOUS; SUBCUTANEOUS ONCE
Qty: 0 | Refills: 0 | Status: COMPLETED | OUTPATIENT
Start: 2018-04-16 | End: 2018-04-16

## 2018-04-16 RX ADMIN — ERYTHROPOIETIN 40000 UNIT(S): 10000 INJECTION, SOLUTION INTRAVENOUS; SUBCUTANEOUS at 14:09

## 2018-04-17 ENCOUNTER — APPOINTMENT (OUTPATIENT)
Dept: INFUSION THERAPY | Facility: CLINIC | Age: 78
End: 2018-04-17

## 2018-04-17 LAB
ABO + RH PNL BLD: NORMAL
ALBUMIN SERPL ELPH-MCNC: 3.2 G/DL
ALP BLD-CCNC: 146 U/L
ALT SERPL-CCNC: 22 U/L
ANION GAP SERPL CALC-SCNC: 17 MMOL/L
AST SERPL-CCNC: 36 U/L
BILIRUB SERPL-MCNC: 1.6 MG/DL
BLD GP AB SCN SERPL QL: NORMAL
BUN SERPL-MCNC: 26 MG/DL
CALCIUM SERPL-MCNC: 8.4 MG/DL
CHLORIDE SERPL-SCNC: 90 MMOL/L
CO2 SERPL-SCNC: 27 MMOL/L
CREAT SERPL-MCNC: 1.4 MG/DL
GLUCOSE SERPL-MCNC: 156 MG/DL
POTASSIUM SERPL-SCNC: 2.9 MMOL/L
PROT SERPL-MCNC: 6.4 G/DL
SODIUM SERPL-SCNC: 134 MMOL/L
URATE SERPL-MCNC: 11.4 MG/DL

## 2018-04-19 ENCOUNTER — LABORATORY RESULT (OUTPATIENT)
Age: 78
End: 2018-04-19

## 2018-04-19 ENCOUNTER — APPOINTMENT (OUTPATIENT)
Dept: HEMATOLOGY ONCOLOGY | Facility: CLINIC | Age: 78
End: 2018-04-19

## 2018-04-20 LAB
HCT VFR BLD CALC: 23.6 %
HGB BLD-MCNC: 8.4 G/DL
MCHC RBC-ENTMCNC: 27.4 PG
MCHC RBC-ENTMCNC: 35.6 G/DL
MCV RBC AUTO: 76.9 FL
PLATELET # BLD AUTO: 18 K/UL
PMV BLD: NORMAL
RBC # BLD: 3.07 M/UL
RBC # FLD: 22.9 %
WBC # FLD AUTO: 37.6 K/UL

## 2018-04-23 ENCOUNTER — LABORATORY RESULT (OUTPATIENT)
Age: 78
End: 2018-04-23

## 2018-04-23 ENCOUNTER — APPOINTMENT (OUTPATIENT)
Dept: INFUSION THERAPY | Facility: CLINIC | Age: 78
End: 2018-04-23

## 2018-04-23 LAB
ABO + RH PNL BLD: NORMAL
BLD GP AB SCN SERPL QL: NORMAL
HCT VFR BLD CALC: 19.8 %
HGB BLD-MCNC: 6.8 G/DL
MCHC RBC-ENTMCNC: 27.9 PG
MCHC RBC-ENTMCNC: 34.3 G/DL
MCV RBC AUTO: 81.1 FL
PLATELET # BLD AUTO: 21 K/UL
PMV BLD: NORMAL
RBC # BLD: 2.44 M/UL
RBC # FLD: 22.5 %
WBC # FLD AUTO: 14.23 K/UL

## 2018-04-23 RX ORDER — ERYTHROPOIETIN 10000 [IU]/ML
40000 INJECTION, SOLUTION INTRAVENOUS; SUBCUTANEOUS ONCE
Qty: 0 | Refills: 0 | Status: COMPLETED | OUTPATIENT
Start: 2018-04-23 | End: 2018-04-23

## 2018-04-23 RX ADMIN — ERYTHROPOIETIN 40000 UNIT(S): 10000 INJECTION, SOLUTION INTRAVENOUS; SUBCUTANEOUS at 12:29

## 2018-04-24 ENCOUNTER — APPOINTMENT (OUTPATIENT)
Dept: INFUSION THERAPY | Facility: CLINIC | Age: 78
End: 2018-04-24

## 2018-04-24 LAB
ALBUMIN SERPL ELPH-MCNC: 3.1 G/DL
ALP BLD-CCNC: 93 U/L
ALT SERPL-CCNC: 11 U/L
ANION GAP SERPL CALC-SCNC: 10 MMOL/L
AST SERPL-CCNC: 13 U/L
BILIRUB SERPL-MCNC: 1 MG/DL
BUN SERPL-MCNC: 15 MG/DL
CALCIUM SERPL-MCNC: 8.2 MG/DL
CHLORIDE SERPL-SCNC: 98 MMOL/L
CO2 SERPL-SCNC: 26 MMOL/L
CREAT SERPL-MCNC: 1.2 MG/DL
GLUCOSE SERPL-MCNC: 114 MG/DL
POTASSIUM SERPL-SCNC: 3.9 MMOL/L
PROT SERPL-MCNC: 6.2 G/DL
SODIUM SERPL-SCNC: 134 MMOL/L

## 2018-04-26 ENCOUNTER — APPOINTMENT (OUTPATIENT)
Dept: HEMATOLOGY ONCOLOGY | Facility: CLINIC | Age: 78
End: 2018-04-26

## 2018-04-26 ENCOUNTER — LABORATORY RESULT (OUTPATIENT)
Age: 78
End: 2018-04-26

## 2018-04-26 LAB
HCT VFR BLD CALC: 25 %
HGB BLD-MCNC: 8.8 G/DL
MCHC RBC-ENTMCNC: 29.4 PG
MCHC RBC-ENTMCNC: 35.2 G/DL
MCV RBC AUTO: 83.6 FL
PLATELET # BLD AUTO: 20 K/UL
PMV BLD: NORMAL
RBC # BLD: 2.99 M/UL
RBC # FLD: 18.8 %
WBC # FLD AUTO: 11.23 K/UL

## 2018-04-30 ENCOUNTER — APPOINTMENT (OUTPATIENT)
Dept: INFUSION THERAPY | Facility: CLINIC | Age: 78
End: 2018-04-30

## 2018-04-30 ENCOUNTER — LABORATORY RESULT (OUTPATIENT)
Age: 78
End: 2018-04-30

## 2018-04-30 LAB
HCT VFR BLD CALC: 25.3 %
HGB BLD-MCNC: 8.5 G/DL
MCHC RBC-ENTMCNC: 28.8 PG
MCHC RBC-ENTMCNC: 33.6 G/DL
MCV RBC AUTO: 85.8 FL
PLATELET # BLD AUTO: 37 K/UL
PMV BLD: NORMAL
RBC # BLD: 2.95 M/UL
RBC # FLD: 20.3 %
WBC # FLD AUTO: 7.6 K/UL

## 2018-04-30 RX ORDER — ERYTHROPOIETIN 10000 [IU]/ML
40000 INJECTION, SOLUTION INTRAVENOUS; SUBCUTANEOUS ONCE
Qty: 0 | Refills: 0 | Status: COMPLETED | OUTPATIENT
Start: 2018-04-30 | End: 2018-04-30

## 2018-04-30 RX ADMIN — ERYTHROPOIETIN 40000 UNIT(S): 10000 INJECTION, SOLUTION INTRAVENOUS; SUBCUTANEOUS at 11:52

## 2018-05-03 ENCOUNTER — APPOINTMENT (OUTPATIENT)
Dept: HEMATOLOGY ONCOLOGY | Facility: CLINIC | Age: 78
End: 2018-05-03

## 2018-05-03 ENCOUNTER — LABORATORY RESULT (OUTPATIENT)
Age: 78
End: 2018-05-03

## 2018-05-03 LAB
HCT VFR BLD CALC: 23.1 %
HGB BLD-MCNC: 7.8 G/DL
MCHC RBC-ENTMCNC: 29.7 PG
MCHC RBC-ENTMCNC: 33.8 G/DL
MCV RBC AUTO: 87.8 FL
PLATELET # BLD AUTO: 55 K/UL
PMV BLD: NORMAL
RBC # BLD: 2.63 M/UL
RBC # FLD: 20.2 %
WBC # FLD AUTO: 5.26 K/UL

## 2018-05-07 ENCOUNTER — INPATIENT (INPATIENT)
Facility: HOSPITAL | Age: 78
LOS: 3 days | Discharge: HOME | End: 2018-05-11
Attending: INTERNAL MEDICINE | Admitting: INTERNAL MEDICINE

## 2018-05-07 ENCOUNTER — APPOINTMENT (OUTPATIENT)
Dept: HEMATOLOGY ONCOLOGY | Facility: CLINIC | Age: 78
End: 2018-05-07

## 2018-05-07 ENCOUNTER — APPOINTMENT (OUTPATIENT)
Dept: INFUSION THERAPY | Facility: CLINIC | Age: 78
End: 2018-05-07

## 2018-05-07 ENCOUNTER — LABORATORY RESULT (OUTPATIENT)
Age: 78
End: 2018-05-07

## 2018-05-07 VITALS
DIASTOLIC BLOOD PRESSURE: 51 MMHG | RESPIRATION RATE: 16 BRPM | HEIGHT: 62 IN | SYSTOLIC BLOOD PRESSURE: 81 MMHG | HEART RATE: 86 BPM | WEIGHT: 133 LBS | TEMPERATURE: 98.2 F | BODY MASS INDEX: 24.48 KG/M2

## 2018-05-07 VITALS
HEART RATE: 86 BPM | RESPIRATION RATE: 18 BRPM | TEMPERATURE: 97 F | DIASTOLIC BLOOD PRESSURE: 51 MMHG | WEIGHT: 141.1 LBS | SYSTOLIC BLOOD PRESSURE: 96 MMHG

## 2018-05-07 DIAGNOSIS — Z90.710 ACQUIRED ABSENCE OF BOTH CERVIX AND UTERUS: Chronic | ICD-10-CM

## 2018-05-07 LAB
ALBUMIN SERPL ELPH-MCNC: 3.6 G/DL — SIGNIFICANT CHANGE UP (ref 3.5–5.2)
ALP SERPL-CCNC: 171 U/L — HIGH (ref 30–115)
ALT FLD-CCNC: 14 U/L — SIGNIFICANT CHANGE UP (ref 0–41)
ANION GAP SERPL CALC-SCNC: 13 MMOL/L — SIGNIFICANT CHANGE UP (ref 7–14)
AST SERPL-CCNC: 27 U/L — SIGNIFICANT CHANGE UP (ref 0–41)
BASOPHILS # BLD AUTO: 0 K/UL — SIGNIFICANT CHANGE UP (ref 0–0.2)
BASOPHILS NFR BLD AUTO: 0 % — SIGNIFICANT CHANGE UP (ref 0–1)
BILIRUB SERPL-MCNC: 0.8 MG/DL — SIGNIFICANT CHANGE UP (ref 0.2–1.2)
BLD GP AB SCN SERPL QL: SIGNIFICANT CHANGE UP
BUN SERPL-MCNC: 12 MG/DL — SIGNIFICANT CHANGE UP (ref 10–20)
CALCIUM SERPL-MCNC: 8.3 MG/DL — LOW (ref 8.5–10.1)
CHLORIDE SERPL-SCNC: 91 MMOL/L — LOW (ref 98–110)
CO2 SERPL-SCNC: 31 MMOL/L — SIGNIFICANT CHANGE UP (ref 17–32)
CREAT SERPL-MCNC: 1.3 MG/DL — SIGNIFICANT CHANGE UP (ref 0.7–1.5)
EOSINOPHIL # BLD AUTO: 0 K/UL — SIGNIFICANT CHANGE UP (ref 0–0.7)
EOSINOPHIL NFR BLD AUTO: 0 % — SIGNIFICANT CHANGE UP (ref 0–8)
GLUCOSE SERPL-MCNC: 130 MG/DL — HIGH (ref 70–99)
HCT VFR BLD CALC: 18.5 % — LOW (ref 37–47)
HCT VFR BLD CALC: 20.7 %
HGB BLD-MCNC: 6.4 G/DL — CRITICAL LOW (ref 12–16)
HGB BLD-MCNC: 6.9 G/DL
IRON SATN MFR SERPL: 203 UG/DL — HIGH (ref 35–150)
IRON SATN MFR SERPL: 89 % — HIGH (ref 15–50)
LYMPHOCYTES # BLD AUTO: 1.09 K/UL — LOW (ref 1.2–3.4)
LYMPHOCYTES # BLD AUTO: 15.9 % — LOW (ref 20.5–51.1)
MAGNESIUM SERPL-MCNC: 1.4 MG/DL — LOW (ref 1.8–2.4)
MCHC RBC-ENTMCNC: 28.6 PG
MCHC RBC-ENTMCNC: 29 PG — SIGNIFICANT CHANGE UP (ref 27–31)
MCHC RBC-ENTMCNC: 33.3 G/DL
MCHC RBC-ENTMCNC: 34.6 G/DL — SIGNIFICANT CHANGE UP (ref 32–37)
MCV RBC AUTO: 83.7 FL — SIGNIFICANT CHANGE UP (ref 81–99)
MCV RBC AUTO: 85.9 FL
MONOCYTES # BLD AUTO: 0 K/UL — LOW (ref 0.1–0.6)
MONOCYTES NFR BLD AUTO: 0 % — LOW (ref 1.7–9.3)
NEUTROPHILS # BLD AUTO: 4.17 K/UL — SIGNIFICANT CHANGE UP (ref 1.4–6.5)
NEUTROPHILS NFR BLD AUTO: 58.9 % — SIGNIFICANT CHANGE UP (ref 42.2–75.2)
PHOSPHATE SERPL-MCNC: 2 MG/DL — LOW (ref 2.1–4.9)
PLATELET # BLD AUTO: 54 K/UL
PLATELET # BLD AUTO: 62 K/UL — LOW (ref 130–400)
PMV BLD: 11.8 FL
POTASSIUM SERPL-MCNC: 3.4 MMOL/L — LOW (ref 3.5–5)
POTASSIUM SERPL-SCNC: 3.4 MMOL/L — LOW (ref 3.5–5)
PROT SERPL-MCNC: 6.5 G/DL — SIGNIFICANT CHANGE UP (ref 6–8)
RBC # BLD: 2.21 M/UL — LOW (ref 4.2–5.4)
RBC # BLD: 2.41 M/UL
RBC # FLD: 19.8 % — HIGH (ref 11.5–14.5)
RBC # FLD: 20 %
SODIUM SERPL-SCNC: 135 MMOL/L — SIGNIFICANT CHANGE UP (ref 135–146)
TIBC SERPL-MCNC: 229 UG/DL — SIGNIFICANT CHANGE UP (ref 220–430)
TROPONIN T SERPL-MCNC: 0.02 NG/ML — HIGH
TYPE + AB SCN PNL BLD: SIGNIFICANT CHANGE UP
UIBC SERPL-MCNC: 26 UG/DL — LOW (ref 110–370)
WBC # BLD: 6.86 K/UL — SIGNIFICANT CHANGE UP (ref 4.8–10.8)
WBC # FLD AUTO: 6.86 K/UL — SIGNIFICANT CHANGE UP (ref 4.8–10.8)
WBC # FLD AUTO: 7.55 K/UL

## 2018-05-07 RX ORDER — MAGNESIUM SULFATE 500 MG/ML
2 VIAL (ML) INJECTION ONCE
Qty: 0 | Refills: 0 | Status: COMPLETED | OUTPATIENT
Start: 2018-05-07 | End: 2018-05-07

## 2018-05-07 RX ORDER — SODIUM CHLORIDE 9 MG/ML
1000 INJECTION INTRAMUSCULAR; INTRAVENOUS; SUBCUTANEOUS
Qty: 0 | Refills: 0 | Status: DISCONTINUED | OUTPATIENT
Start: 2018-05-07 | End: 2018-05-11

## 2018-05-07 RX ORDER — OXYCODONE AND ACETAMINOPHEN 5; 325 MG/1; MG/1
1 TABLET ORAL EVERY 6 HOURS
Qty: 0 | Refills: 0 | Status: DISCONTINUED | OUTPATIENT
Start: 2018-05-07 | End: 2018-05-11

## 2018-05-07 RX ORDER — ENOXAPARIN SODIUM 100 MG/ML
40 INJECTION SUBCUTANEOUS DAILY
Qty: 0 | Refills: 0 | Status: DISCONTINUED | OUTPATIENT
Start: 2018-05-07 | End: 2018-05-10

## 2018-05-07 RX ORDER — ACETAMINOPHEN 500 MG
650 TABLET ORAL EVERY 6 HOURS
Qty: 0 | Refills: 0 | Status: DISCONTINUED | OUTPATIENT
Start: 2018-05-07 | End: 2018-05-11

## 2018-05-07 RX ORDER — ONDANSETRON 8 MG/1
4 TABLET, FILM COATED ORAL EVERY 6 HOURS
Qty: 0 | Refills: 0 | Status: DISCONTINUED | OUTPATIENT
Start: 2018-05-07 | End: 2018-05-11

## 2018-05-07 RX ORDER — POTASSIUM CHLORIDE 20 MEQ
40 PACKET (EA) ORAL EVERY 4 HOURS
Qty: 0 | Refills: 0 | Status: COMPLETED | OUTPATIENT
Start: 2018-05-07 | End: 2018-05-08

## 2018-05-07 RX ADMIN — Medication 50 GRAM(S): at 21:23

## 2018-05-07 RX ADMIN — SODIUM CHLORIDE 75 MILLILITER(S): 9 INJECTION INTRAMUSCULAR; INTRAVENOUS; SUBCUTANEOUS at 21:30

## 2018-05-07 RX ADMIN — Medication 40 MILLIEQUIVALENT(S): at 21:23

## 2018-05-07 RX ADMIN — Medication 650 MILLIGRAM(S): at 20:39

## 2018-05-07 NOTE — H&P ADULT - NSHPPHYSICALEXAM_GEN_ALL_CORE
T(F): 99.8  HR: 86  BP: 96/51  RR: 18  SpO2: --      PHYSICAL EXAM:  GENERAL: NAD, well-developed  HEAD:  Atraumatic, Normocephalic  EYES: EOMI, PERRLA, conjunctiva and sclera clear  NECK: Supple, No JVD  CHEST/LUNG: Clear to auscultation bilaterally; No wheeze  HEART: Regular rate and rhythm; No murmurs, rubs, or gallops  ABDOMEN: Soft, Nontender, Nondistended; Bowel sounds present  EXTREMITIES:  2+ Peripheral Pulses, No clubbing, cyanosis, or edema  PSYCH: AAOx3  NEUROLOGY: non-focal  SKIN: No rashes or lesions T(F): 97.3  HR: 86  BP: 96/51  RR: 18  SpO2: --      PHYSICAL EXAM:  GENERAL: NAD, well-developed  HEAD:  Atraumatic, Normocephalic  EYES: EOMI, PERRLA, conjunctiva and sclera clear  NECK: Supple, No JVD  CHEST/LUNG: Clear to auscultation bilaterally; No wheeze  HEART: Regular rate and rhythm; No murmurs, rubs, or gallops  ABDOMEN: Soft, Nontender, Nondistended; Bowel sounds present  EXTREMITIES:  2+ Peripheral Pulses, No clubbing, cyanosis, or edema  PSYCH: AAOx3  NEUROLOGY: non-focal  SKIN: No rashes or lesionsT(F): 99.8    HR: 86  BP: 96/51  RR: 18  SpO2: --

## 2018-05-07 NOTE — H&P ADULT - HISTORY OF PRESENT ILLNESS
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 Chronic thrombocytopenia and suspected bone marrow involvement ( positive flow cytometry ) , IgM paraproteinemia.   Chronic myelomonocytic leukemia s/p Vidaza X 1 , wbc is normal , plat :54. Hb is 6.9 , she is hypotensive , emaciated and with symptomatic anemia. will admit for transfusion , hydration , nutrition evaluation. will hold chemotherapy for now.     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 Hb is 6.9 , she is hypotensive , emaciated and with symptomatic anemia. will admit for transfusion , hydration , nutrition evaluation. will hold chemotherapy for now.       BP- 81/51, T- 98.2, P-82, R- 16

## 2018-05-07 NOTE — H&P ADULT - ASSESSMENT
76 y/o old female with a history of NHL dx 5 years ago, Chronic myelomonocytic leukemia dx 10/2017 s/p Vidaza x 1 cycle approx. 3 weeks ago, chronic thrombocytopenia with suspected bone marrow involvement ( positive flow cytometry ) , IgM paraproteinemia presented to the hospital for symptomatic anemia.    # Symptomatic Anemia  - hgb- 6.9 @ Dr[facility]  - I sent Stat labs pending  - Pt complained of SOB with exertion and fatigue  - Ordered 1u prbcs  - Iron studies ordered as well, results pending    #  Chronic myelomonocytic leukemia   - s/p Vidaza x 1 cycle approx. 3 weeks ago  - Not on any active chemotherapeutic agent  - Heme/ Onc consult pending    # Chronic thrombocytopenia  - Platelet- 54 @ Dr. [facility] clinic  - Transfuse if <10 and/or bleeding    # DVT/GI- Lovenox/ not indicated     full code    Regular diet 74 y/o old female with a history of NHL dx 5 years ago, Chronic myelomonocytic leukemia dx 10/2017 s/p Vidaza x 1 cycle approx. 3 weeks ago, chronic thrombocytopenia with suspected bone marrow involvement ( positive flow cytometry ) , IgM paraproteinemia presented to the hospital for symptomatic anemia.    # Symptomatic Anemia  - hgb- 6.9 @ Dr. [facility] clinic  - I sent Stat labs pending  - Pt complained of SOB with exertion and fatigue  - Ordered 1u prbcs  - Iron studies ordered as well, results pending    # Fever ( in hospital) & hypotension ( in office)- r/o septic shock   - Pt spiked T-101.3   - CXR, UA, Blood culture ordered  - If persistently febrile, start broad spectrum abx, pt signed out  - Start NS 75cc/hr     #  Chronic myelomonocytic leukemia   - s/p Vidaza x 1 cycle approx. 3 weeks ago  - Not on any active chemotherapeutic agent  - Heme/ Onc consult pending    # Chronic thrombocytopenia  - Platelet- 54 @ Dr. [facility] clinic  - Transfuse if <10 and/or bleeding    # Hypokalemia  - repleted    # Hypomagnesemia  - repleted    # DVT/GI- Lovenox/ not indicated     full code    Regular diet 74 y/o old female with a history of NHL dx 5 years ago, Chronic myelomonocytic leukemia dx 10/2017 s/p Vidaza x 1 cycle approx. 3 weeks ago , IgM paraproteinemia presented to the hospital for symptomatic anemia.    # Symptomatic Anemia  - hgb- 6.9 @ Dr. [facility] clinic  - I sent Stat labs pending  - Pt complained of SOB with exertion and fatigue  - Ordered 1u prbcs  - Iron studies ordered as well, results pending    # Fever ( in hospital) & hypotension ( in office)- r/o septic shock   - Pt spiked T-101.3   - CXR, UA, Blood culture ordered  - If persistently febrile, start broad spectrum abx, pt signed out  - Start NS 75cc/hr     #  Chronic myelomonocytic leukemia   - s/p Vidaza x 1 cycle approx. 3 weeks ago  - Not on any active chemotherapeutic agent  - Heme/ Onc consult pending    # Chronic thrombocytopenia  - Platelet- 54 @ Dr. [facility] clinic  - Transfuse if <10 and/or bleeding    # Hypokalemia  - repleted    # Hypomagnesemia  - repleted    # DVT/GI- Lovenox/ not indicated     full code    Regular diet

## 2018-05-07 NOTE — H&P ADULT - NSHPLABSRESULTS_GEN_ALL_CORE
T(F): 99.8  HR: 86  BP: 96/51  RR: 18  SpO2: --        ECHO   1. Left ventricular ejection fraction, by visual estimation, is 60 to   65%.   2. Normal left ventricular size and wall thicknesses, with normal   systolic and diastolic function.   3. The mean global longitudinal strain by speckle tracking is -20.3%   which is normal.   4. LA volume Index is 30.8 ml/m² ml/m2.                              Blood Cultures        RECENT CULTURES:        RESPIRATORY CULTURES: T(F): 97.3  HR: 86  BP: 96/51  RR: 18  SpO2: --                          6.4    6.86  )-----------( 62       ( 07 May 2018 18:20 )             18.5       05-07    135  |  91<L>  |  12  ----------------------------<  130<H>  3.4<L>   |  31  |  1.3    Ca    8.3<L>      07 May 2018 18:20  Phos  2.0     05-07  Mg     1.4     05-07    TPro  6.5  /  Alb  3.6  /  TBili  0.8  /  DBili  x   /  AST  27  /  ALT  14  /  AlkPhos  171<H>  05-07      LIVER FUNCTIONS - ( 07 May 2018 18:20 )  Alb: 3.6 g/dL / Pro: 6.5 g/dL / ALK PHOS: 171 U/L / ALT: 14 U/L / AST: 27 U/L / GGT: x                 CARDIAC MARKERS ( 07 May 2018 18:20 )  x     / 0.02 ng/mL / x     / x     / x            ECHO   1. Left ventricular ejection fraction, by visual estimation, is 60 to   65%.   2. Normal left ventricular size and wall thicknesses, with normal   systolic and diastolic function.   3. The mean global longitudinal strain by speckle tracking is -20.3%   which is normal.   4. LA volume Index is 30.8 ml/m² ml/m2.

## 2018-05-07 NOTE — H&P ADULT - HISTORY OF PRESENT ILLNESS
76 y/o old female with a history of NHL dx 5 years ago, Chronic myelomonocytic leukemia dx 10/2017 s/p Vidaza x 1 cycle approx. 3 weeks ago, chronic thrombocytopenia and suspected bone marrow involvement ( positive flow cytometry ) , IgM paraproteinemia presented to Dr. Simental's clinic with  BP- 81/51, HR- 86, RR- 16, T- 98.6 and her labs showed plat :54. Hb is 6.9. Pt was hypotensive , emaciated and was admitted for transfusion , hydration , nutrition evaluation. chemotherapy was held 76 y/o old female with a history of NHL dx 5 years ago, Chronic myelomonocytic leukemia dx 10/2017 s/p Vidaza x 1 cycle approx. 3 weeks ago, chronic thrombocytopenia with suspected bone marrow involvement ( positive flow cytometry ) , IgM paraproteinemia presented to the hospital for symptomatic anemia.  Pt was seen in  Dr. Alfredo's clinic  today with  BP- 81/51, HR- 86, RR- 16, T- 98.2. Her weight loss 15 ( 148-->133)pounds since April 3rd. Her labs showed plat :54. Hb is 6.9 ( baseline around 9).  Pt was hypotensive ,was admitted for transfusion , hydration , nutrition evaluation. Per pt, she was suppose to get chemo this AM, but this was held given her vitals and labs. Her baseline 's/50's.   Pt complained of fatigue, SOB with exertion and denied CP, palpitations,  fevers,chills, N/V/D or constipation. She has had 3u pRBCs since initiation of her chemotherapy and pt denied hematochezia/melena. She doesnt take any meds including ASA 81mg.

## 2018-05-07 NOTE — H&P ADULT - ATTENDING COMMENTS
patient examined above note read and modified. will transfuse , culture and start antibiotics pending cultures.

## 2018-05-08 ENCOUNTER — APPOINTMENT (OUTPATIENT)
Dept: INFUSION THERAPY | Facility: CLINIC | Age: 78
End: 2018-05-08

## 2018-05-08 LAB
ANION GAP SERPL CALC-SCNC: 10 MMOL/L — SIGNIFICANT CHANGE UP (ref 7–14)
APPEARANCE UR: (no result)
BILIRUB UR-MCNC: NEGATIVE — SIGNIFICANT CHANGE UP
BUN SERPL-MCNC: 11 MG/DL — SIGNIFICANT CHANGE UP (ref 10–20)
CALCIUM SERPL-MCNC: 8.3 MG/DL — LOW (ref 8.5–10.1)
CHLORIDE SERPL-SCNC: 99 MMOL/L — SIGNIFICANT CHANGE UP (ref 98–110)
CO2 SERPL-SCNC: 28 MMOL/L — SIGNIFICANT CHANGE UP (ref 17–32)
COLOR SPEC: YELLOW — SIGNIFICANT CHANGE UP
CREAT SERPL-MCNC: 1.1 MG/DL — SIGNIFICANT CHANGE UP (ref 0.7–1.5)
DIFF PNL FLD: (no result)
FERRITIN SERPL-MCNC: 3162 NG/ML — HIGH (ref 15–150)
GLUCOSE SERPL-MCNC: 169 MG/DL — HIGH (ref 70–99)
GLUCOSE UR QL: NEGATIVE MG/DL — SIGNIFICANT CHANGE UP
HCT VFR BLD CALC: 22.5 % — LOW (ref 37–47)
HGB BLD-MCNC: 7.6 G/DL — LOW (ref 12–16)
KETONES UR-MCNC: NEGATIVE — SIGNIFICANT CHANGE UP
LEUKOCYTE ESTERASE UR-ACNC: (no result)
MCHC RBC-ENTMCNC: 27.9 PG — SIGNIFICANT CHANGE UP (ref 27–31)
MCHC RBC-ENTMCNC: 33.8 G/DL — SIGNIFICANT CHANGE UP (ref 32–37)
MCV RBC AUTO: 82.7 FL — SIGNIFICANT CHANGE UP (ref 81–99)
NITRITE UR-MCNC: NEGATIVE — SIGNIFICANT CHANGE UP
NRBC # BLD: 0 /100 WBCS — SIGNIFICANT CHANGE UP (ref 0–0)
PH UR: 6 — SIGNIFICANT CHANGE UP (ref 5–8)
PLATELET # BLD AUTO: 50 K/UL — LOW (ref 130–400)
POTASSIUM SERPL-MCNC: 3.6 MMOL/L — SIGNIFICANT CHANGE UP (ref 3.5–5)
POTASSIUM SERPL-SCNC: 3.6 MMOL/L — SIGNIFICANT CHANGE UP (ref 3.5–5)
PROT UR-MCNC: 30 MG/DL
RBC # BLD: 2.72 M/UL — LOW (ref 4.2–5.4)
RBC # FLD: 19.7 % — HIGH (ref 11.5–14.5)
SODIUM SERPL-SCNC: 137 MMOL/L — SIGNIFICANT CHANGE UP (ref 135–146)
SP GR SPEC: 1.01 — SIGNIFICANT CHANGE UP (ref 1.01–1.03)
UROBILINOGEN FLD QL: 0.2 MG/DL — SIGNIFICANT CHANGE UP (ref 0.2–0.2)
WBC # BLD: 7.23 K/UL — SIGNIFICANT CHANGE UP (ref 4.8–10.8)
WBC # FLD AUTO: 7.23 K/UL — SIGNIFICANT CHANGE UP (ref 4.8–10.8)

## 2018-05-08 RX ORDER — CEFEPIME 1 G/1
2000 INJECTION, POWDER, FOR SOLUTION INTRAMUSCULAR; INTRAVENOUS DAILY
Qty: 0 | Refills: 0 | Status: DISCONTINUED | OUTPATIENT
Start: 2018-05-08 | End: 2018-05-08

## 2018-05-08 RX ORDER — CEFEPIME 1 G/1
INJECTION, POWDER, FOR SOLUTION INTRAMUSCULAR; INTRAVENOUS
Qty: 0 | Refills: 0 | Status: DISCONTINUED | OUTPATIENT
Start: 2018-05-08 | End: 2018-05-08

## 2018-05-08 RX ORDER — CEFEPIME 1 G/1
2000 INJECTION, POWDER, FOR SOLUTION INTRAMUSCULAR; INTRAVENOUS DAILY
Qty: 0 | Refills: 0 | Status: DISCONTINUED | OUTPATIENT
Start: 2018-05-08 | End: 2018-05-11

## 2018-05-08 RX ORDER — DRONABINOL 2.5 MG
2.5 CAPSULE ORAL
Qty: 0 | Refills: 0 | Status: DISCONTINUED | OUTPATIENT
Start: 2018-05-08 | End: 2018-05-11

## 2018-05-08 RX ADMIN — Medication 1 TABLET(S): at 11:40

## 2018-05-08 RX ADMIN — CEFEPIME 100 MILLIGRAM(S): 1 INJECTION, POWDER, FOR SOLUTION INTRAMUSCULAR; INTRAVENOUS at 13:54

## 2018-05-08 RX ADMIN — Medication 650 MILLIGRAM(S): at 05:25

## 2018-05-08 RX ADMIN — Medication 40 MILLIEQUIVALENT(S): at 01:21

## 2018-05-08 RX ADMIN — Medication 650 MILLIGRAM(S): at 19:28

## 2018-05-08 NOTE — PROGRESS NOTE ADULT - ASSESSMENT
75 year old female with recent diagnosis of CMML 04/2018. S/P 1 cycle of vidaza 3 weeks ago now presenting with symptomatic anemia.     1. Symptomatic Anemia:      secondary to CMML as well as Vidaza.      S/p 1 unit PRBC yesterday. Please transfuse another unit today.      Keep Hb >8 and platelets >20,000.        2. Fevers: Not related to transfusion as she spiked before it.                 r/o infections. Pan culture and chest x ray.                 continue empiric cefepime for now. if cultures are negative will discontinue abx.     3. Loss of appetite: add marinol.     4. DVT/GI ppx

## 2018-05-08 NOTE — PROGRESS NOTE ADULT - ASSESSMENT
76 y/o old female with a history of NHL dx 5 years ago, Chronic myelomonocytic leukemia dx 10/2017 s/p Vidaza x 1 cycle approx. 3 weeks ago, chronic thrombocytopenia with suspected bone marrow involvement ( positive flow cytometry ) , IgM paraproteinemia presented to the hospital for symptomatic anemia.    # Symptomatic Anemia  - hgb- 6.9 @ Dr. [facility] clinic - in hospital 6.4   - Pt complained of SOB with exertion and fatigue  - S/p 1U PRBC. Will transfuse 1 more unit  - Iron studies ordered as well, results pending    # Fever ( in hospital) & hypotension ( in office)- r/o sepsis   - Pt spiked T-101.3   - CXR wnl, UA negative, Blood culture pending  - Will start cefepime. If cx negative will dc    #BRIT  - baseline 0.6, now 1.3 will trend  - c/w IVF NS 75cc/hr    #  Chronic myelomonocytic leukemia   - s/p Vidaza x 1 cycle approx. 3 weeks ago  - Not on any active chemotherapeutic agent  - Heme/ Onc following    # Chronic thrombocytopenia  - Platelet- 54 @ Dr. Jackson's clinic  - Transfuse if <15 or <50 if bleeding    # Hypokalemia  - repleted    # Hypomagnesemia  - repleted, f/u mag    # DVT/GI- Lovenox/ not indicated     full code    Regular diet

## 2018-05-08 NOTE — PROGRESS NOTE ADULT - SUBJECTIVE AND OBJECTIVE BOX
patient seen and examined.   Spiked a fever f 101 yesterday evening. So far received 1 unit of pRBC.   Denies abdominal pain, diarrhea, cough, sore throat, chills.   Report weight loss of 20 lbs in last few months.     Vital Signs Last 24 Hrs  T(C): 38.2 (08 May 2018 05:31), Max: 38.5 (07 May 2018 19:32)  T(F): 100.8 (08 May 2018 05:31), Max: 101.3 (07 May 2018 19:32)  HR: 80 (08 May 2018 05:31) (80 - 87)  BP: 103/52 (08 May 2018 05:31) (96/51 - 110/50)  BP(mean): --  RR: 17 (08 May 2018 05:31) (17 - 18)  SpO2: --    PHYSICAL EXAM:    Constitutional: Non toxic appearing  Respiratory: B/l BS present. Now wheezing  Cardiovascular: s1 s2 +  Gastrointestinal: soft, ND, Non tender  Extremities:  no edema  Neurological: No focal deficits. AAox 3    MEDICATIONS  (STANDING):  cefepime   IVPB 2000 milliGRAM(s) IV Intermittent daily  enoxaparin Injectable 40 milliGRAM(s) SubCutaneous daily  multivitamin 1 Tablet(s) Oral daily  sodium chloride 0.9%. 1000 milliLiter(s) (75 mL/Hr) IV Continuous <Continuous>    MEDICATIONS  (PRN):  acetaminophen   Tablet 650 milliGRAM(s) Oral every 6 hours PRN For Temp greater than 38 C (100.4 F)  dronabinol 2.5 milliGRAM(s) Oral two times a day PRN with meals  ondansetron Injectable 4 milliGRAM(s) IV Push every 6 hours PRN Nausea and/or Vomiting  oxyCODONE    5 mG/acetaminophen 325 mG 1 Tablet(s) Oral every 6 hours PRN Severe Pain (7 - 10)    CBC Full  -  ( 07 May 2018 18:20 )  WBC Count : 6.86 K/uL  Hemoglobin : 6.4 g/dL  Hematocrit : 18.5 %  Platelet Count - Automated : 62 K/uL  Mean Cell Volume : 83.7 fL  Mean Cell Hemoglobin : 29.0 pg  Mean Cell Hemoglobin Concentration : 34.6 g/dL  Auto Neutrophil # : 4.17 K/uL  Auto Lymphocyte # : 1.09 K/uL  Auto Monocyte # : 0.00 K/uL  Auto Eosinophil # : 0.00 K/uL  Auto Basophil # : 0.00 K/uL  Auto Neutrophil % : 58.9 %  Auto Lymphocyte % : 15.9 %  Auto Monocyte % : 0.0 %  Auto Eosinophil % : 0.0 %  Auto Basophil % : 0.0 %    05-07    135  |  91<L>  |  12  ----------------------------<  130<H>  3.4<L>   |  31  |  1.3    Ca    8.3<L>      07 May 2018 18:20  Phos  2.0     05-07  Mg     1.4     05-07    TPro  6.5  /  Alb  3.6  /  TBili  0.8  /  DBili  x   /  AST  27  /  ALT  14  /  AlkPhos  171<H>  05-07

## 2018-05-08 NOTE — PROGRESS NOTE ADULT - SUBJECTIVE AND OBJECTIVE BOX
SUBJECTIVE:    Patient is a 77y old Female who presents with a chief complaint of Anemia (07 May 2018 18:10)    Currently admitted to medicine with the primary diagnosis of symptomatic anemia and fevers.   Today is hospital day 1d. Overnight patient received 1U PRBC and febrile to 101 prior to transfusion.    PAST MEDICAL & SURGICAL HISTORY  Non-Hodgkins lymphoma  S/P total abdominal hysterectomy    SOCIAL HISTORY:  Negative for smoking/alcohol/drug use.     ALLERGIES:  penicillin (Unknown)    MEDICATIONS:  STANDING MEDICATIONS  cefepime   IVPB 2000 milliGRAM(s) IV Intermittent daily  enoxaparin Injectable 40 milliGRAM(s) SubCutaneous daily  multivitamin 1 Tablet(s) Oral daily  sodium chloride 0.9%. 1000 milliLiter(s) IV Continuous <Continuous>    PRN MEDICATIONS  acetaminophen   Tablet 650 milliGRAM(s) Oral every 6 hours PRN  dronabinol 2.5 milliGRAM(s) Oral two times a day PRN  ondansetron Injectable 4 milliGRAM(s) IV Push every 6 hours PRN  oxyCODONE    5 mG/acetaminophen 325 mG 1 Tablet(s) Oral every 6 hours PRN    VITALS:   T(F): 100.8  HR: 80  BP: 103/52  RR: 17  SpO2: --    LABS:                        7.6    7.23  )-----------( 50       ( 08 May 2018 09:06 )             22.5     -    135  |  91<L>  |  12  ----------------------------<  130<H>  3.4<L>   |  31  |  1.3    Ca    8.3<L>      07 May 2018 18:20  Phos  2.0     -  Mg     1.4     -    TPro  6.5  /  Alb  3.6  /  TBili  0.8  /  DBili  x   /  AST  27  /  ALT  14  /  AlkPhos  171<H>        Urinalysis Basic - ( 08 May 2018 03:38 )    Color: Yellow / Appearance: Cloudy / S.010 / pH: x  Gluc: x / Ketone: Negative  / Bili: Negative / Urobili: 0.2 mg/dL   Blood: x / Protein: 30 mg/dL / Nitrite: Negative   Leuk Esterase: Small / RBC: 1-2 /HPF / WBC 6-10 /HPF   Sq Epi: x / Non Sq Epi: x / Bacteria: x        Troponin T, Serum: 0.02 ng/mL <H> (18 @ 18:20)      CARDIAC MARKERS ( 07 May 2018 18:20 )  x     / 0.02 ng/mL / x     / x     / x          RADIOLOGY:    < from: Xray Chest 2 Views PA/Lat (18 @ 21:09) >      No radiographic evidence of acute cardiopulmonary disease.      < end of copied text >      PHYSICAL EXAM:  GEN: No acute distress  LUNGS: Clear to auscultation bilaterally   HEART: S1/S2 present. RRR.   ABD: Soft, non-tender, non-distended. Bowel sounds present  EXT: NC/NC/NE/2+PP/CHAPMAN  NEURO: AAOX3

## 2018-05-09 ENCOUNTER — APPOINTMENT (OUTPATIENT)
Dept: INFUSION THERAPY | Facility: CLINIC | Age: 78
End: 2018-05-09

## 2018-05-09 LAB
ANION GAP SERPL CALC-SCNC: 12 MMOL/L — SIGNIFICANT CHANGE UP (ref 7–14)
BASOPHILS # BLD AUTO: 0.02 K/UL — SIGNIFICANT CHANGE UP (ref 0–0.2)
BASOPHILS NFR BLD AUTO: 0.3 % — SIGNIFICANT CHANGE UP (ref 0–1)
BUN SERPL-MCNC: 12 MG/DL — SIGNIFICANT CHANGE UP (ref 10–20)
CALCIUM SERPL-MCNC: 8.5 MG/DL — SIGNIFICANT CHANGE UP (ref 8.5–10.1)
CHLORIDE SERPL-SCNC: 95 MMOL/L — LOW (ref 98–110)
CO2 SERPL-SCNC: 29 MMOL/L — SIGNIFICANT CHANGE UP (ref 17–32)
CREAT SERPL-MCNC: 0.9 MG/DL — SIGNIFICANT CHANGE UP (ref 0.7–1.5)
CULTURE RESULTS: NO GROWTH — SIGNIFICANT CHANGE UP
EOSINOPHIL # BLD AUTO: 0.02 K/UL — SIGNIFICANT CHANGE UP (ref 0–0.7)
EOSINOPHIL NFR BLD AUTO: 0.3 % — SIGNIFICANT CHANGE UP (ref 0–8)
GLUCOSE SERPL-MCNC: 148 MG/DL — HIGH (ref 70–99)
HCT VFR BLD CALC: 24.3 % — LOW (ref 37–47)
HGB BLD-MCNC: 8.4 G/DL — LOW (ref 12–16)
IMM GRANULOCYTES NFR BLD AUTO: 23.2 % — HIGH (ref 0.1–0.3)
LYMPHOCYTES # BLD AUTO: 0.94 K/UL — LOW (ref 1.2–3.4)
LYMPHOCYTES # BLD AUTO: 11.8 % — LOW (ref 20.5–51.1)
MAGNESIUM SERPL-MCNC: 1.7 MG/DL — LOW (ref 1.8–2.4)
MCHC RBC-ENTMCNC: 28.3 PG — SIGNIFICANT CHANGE UP (ref 27–31)
MCHC RBC-ENTMCNC: 34.6 G/DL — SIGNIFICANT CHANGE UP (ref 32–37)
MCV RBC AUTO: 81.8 FL — SIGNIFICANT CHANGE UP (ref 81–99)
MONOCYTES # BLD AUTO: 1.74 K/UL — HIGH (ref 0.1–0.6)
MONOCYTES NFR BLD AUTO: 21.9 % — HIGH (ref 1.7–9.3)
NEUTROPHILS # BLD AUTO: 3.38 K/UL — SIGNIFICANT CHANGE UP (ref 1.4–6.5)
NEUTROPHILS NFR BLD AUTO: 42.5 % — SIGNIFICANT CHANGE UP (ref 42.2–75.2)
NRBC # BLD: 0 /100 WBCS — SIGNIFICANT CHANGE UP (ref 0–0)
PLATELET # BLD AUTO: 39 K/UL — LOW (ref 130–400)
POTASSIUM SERPL-MCNC: 2.8 MMOL/L — LOW (ref 3.5–5)
POTASSIUM SERPL-SCNC: 2.8 MMOL/L — LOW (ref 3.5–5)
RBC # BLD: 2.97 M/UL — LOW (ref 4.2–5.4)
RBC # FLD: 18.6 % — HIGH (ref 11.5–14.5)
SODIUM SERPL-SCNC: 136 MMOL/L — SIGNIFICANT CHANGE UP (ref 135–146)
SPECIMEN SOURCE: SIGNIFICANT CHANGE UP
WBC # BLD: 7.94 K/UL — SIGNIFICANT CHANGE UP (ref 4.8–10.8)
WBC # FLD AUTO: 7.94 K/UL — SIGNIFICANT CHANGE UP (ref 4.8–10.8)

## 2018-05-09 RX ORDER — MAGNESIUM SULFATE 500 MG/ML
2 VIAL (ML) INJECTION ONCE
Qty: 0 | Refills: 0 | Status: COMPLETED | OUTPATIENT
Start: 2018-05-09 | End: 2018-05-09

## 2018-05-09 RX ORDER — POTASSIUM CHLORIDE 20 MEQ
20 PACKET (EA) ORAL
Qty: 0 | Refills: 0 | Status: COMPLETED | OUTPATIENT
Start: 2018-05-09 | End: 2018-05-09

## 2018-05-09 RX ADMIN — Medication 50 MILLIEQUIVALENT(S): at 16:30

## 2018-05-09 RX ADMIN — Medication 50 MILLIEQUIVALENT(S): at 13:55

## 2018-05-09 RX ADMIN — Medication 1 TABLET(S): at 11:24

## 2018-05-09 RX ADMIN — Medication 50 GRAM(S): at 12:44

## 2018-05-09 RX ADMIN — CEFEPIME 100 MILLIGRAM(S): 1 INJECTION, POWDER, FOR SOLUTION INTRAMUSCULAR; INTRAVENOUS at 12:07

## 2018-05-09 NOTE — PROGRESS NOTE ADULT - ATTENDING COMMENTS
patient examined , above note read and modified. Still with fever , non-toxic looking , cultures negative so far . Hb improved post transfusion .

## 2018-05-09 NOTE — PROGRESS NOTE ADULT - SUBJECTIVE AND OBJECTIVE BOX
SUBJECTIVE:    Patient is a 77y old Female who presents with a chief complaint of Anemia (07 May 2018 18:10)    Currently admitted to medicine with the primary diagnosis of symptomatic anemia, fevers.   Today is hospital day 2d. Overnight patient had 2 episodes of fevers. Currently feeling well without complaints.     PAST MEDICAL & SURGICAL HISTORY  Non-Hodgkins lymphoma  S/P total abdominal hysterectomy    SOCIAL HISTORY:  Negative for smoking/alcohol/drug use.     ALLERGIES:  penicillin (Unknown)    MEDICATIONS:  STANDING MEDICATIONS  cefepime   IVPB 2000 milliGRAM(s) IV Intermittent daily  enoxaparin Injectable 40 milliGRAM(s) SubCutaneous daily  multivitamin 1 Tablet(s) Oral daily  sodium chloride 0.9%. 1000 milliLiter(s) IV Continuous <Continuous>    PRN MEDICATIONS  acetaminophen   Tablet 650 milliGRAM(s) Oral every 6 hours PRN  dronabinol 2.5 milliGRAM(s) Oral two times a day PRN  ondansetron Injectable 4 milliGRAM(s) IV Push every 6 hours PRN  oxyCODONE    5 mG/acetaminophen 325 mG 1 Tablet(s) Oral every 6 hours PRN    VITALS:   T(F): 100.4  HR: 85  BP: 101/49  RR: 17  SpO2: 95%    LABS:                        8.4    7.94  )-----------( 39       ( 09 May 2018 10:10 )             24.3     -    136  |  95<L>  |  12  ----------------------------<  148<H>  2.8<L>   |  29  |  0.9    Ca    8.5      09 May 2018 10:10  Phos  2.0       Mg     1.7         TPro  6.5  /  Alb  3.6  /  TBili  0.8  /  DBili  x   /  AST  27  /  ALT  14  /  AlkPhos  171<H>        Urinalysis Basic - ( 08 May 2018 03:38 )    Color: Yellow / Appearance: Cloudy / S.010 / pH: x  Gluc: x / Ketone: Negative  / Bili: Negative / Urobili: 0.2 mg/dL   Blood: x / Protein: 30 mg/dL / Nitrite: Negative   Leuk Esterase: Small / RBC: 1-2 /HPF / WBC 6-10 /HPF   Sq Epi: x / Non Sq Epi: x / Bacteria: x        Culture - Urine (collected 08 May 2018 03:37)  Source: .Urine Clean Catch (Midstream)  Final Report (09 May 2018 08:20):    No growth      CARDIAC MARKERS ( 07 May 2018 18:20 )  x     / 0.02 ng/mL / x     / x     / x          RADIOLOGY:    < from: Xray Chest 2 Views PA/Lat (18 @ 21:09) >      No radiographic evidence of acute cardiopulmonary disease.      < end of copied text >      PHYSICAL EXAM:  GEN: No acute distress  LUNGS: Clear to auscultation bilaterally   HEART: S1/S2 present. RRR.   ABD: Soft, non-tender, non-distended. Bowel sounds present  EXT: NC/NC/NE/2+PP/CHAPMAN  NEURO: AAOX3

## 2018-05-09 NOTE — DIETITIAN INITIAL EVALUATION ADULT. - OTHER INFO
Pt w/ symptomatic anemia 2/2 recently diagnosed CMML as well as vidaza. S/P prbc. RD referral received for unintentional wt loss x 1 month. Pt reports intake is now improving, started on marinol however c/o difficulty tolerating large amounts at once 2/2 bloating and feelings of fullness. Educated pt on small frequent meals and snacks, avoiding high fat foods + high fiber foods that may contribute to early satiety,  liquids from meals, and adequate protein intake. Pt receptive. Pt reports 15 lb unintentional wt loss since april, with reported UBW of 148 lbs. However, based on admit wt, pt has had an 8 pound weight loss (5.4%), which is still clinically significant. Although intake improving, pt meets criteria for severe PCM with >5% wt loss x 1 month and meeting <75% estimated needs by 1 month. Pt denies need for any supplements at this time. Reviewed available food prefs

## 2018-05-09 NOTE — DIETITIAN INITIAL EVALUATION ADULT. - ENERGY NEEDS
Calories: 0623-4019 kcals/day (MSJ x 1.3-1.5)  Protein: 70-83 g/day (1.1-1.3 g/kg ABW)  Fluids: 1ml/kcal

## 2018-05-09 NOTE — DIETITIAN INITIAL EVALUATION ADULT. - NS AS NUTRI INTERV MEALS SNACK
Continue current diet. Educated pt on ways to avoid early satiety and increase nutrient dense foods. Denies need for PO supplements at this time as PO intake is improving

## 2018-05-09 NOTE — PROGRESS NOTE ADULT - SUBJECTIVE AND OBJECTIVE BOX
patient seen and examined.   still spiking fevers.   s/p 1 unit of prbc yesterday at 10:00 and she spiked 101 at 8:00 pm.   No clear infectious source.       Vital Signs Last 24 Hrs  T(C): 38 (09 May 2018 07:01), Max: 38.3 (08 May 2018 19:16)  T(F): 100.4 (09 May 2018 07:01), Max: 101 (08 May 2018 19:16)  HR: 85 (09 May 2018 07:01) (80 - 85)  BP: 101/49 (09 May 2018 07:01) (101/49 - 117/57)  BP(mean): --  RR: 17 (09 May 2018 07:01) (17 - 18)  SpO2: 95% (08 May 2018 20:15) (95% - 95%)    PHYSICAL EXAM:    Constitutional: Non toxic appearing  Respiratory: B/l BS present. Now wheezing  Cardiovascular: s1 s2 +  Gastrointestinal: soft, ND, Non tender  Extremities:  no edema  Neurological: No focal deficits. AAox 3    MEDICATIONS  (STANDING):  cefepime   IVPB 2000 milliGRAM(s) IV Intermittent daily  enoxaparin Injectable 40 milliGRAM(s) SubCutaneous daily  magnesium sulfate  IVPB 2 Gram(s) IV Intermittent once  multivitamin 1 Tablet(s) Oral daily  potassium chloride  20 mEq/100 mL IVPB 20 milliEquivalent(s) IV Intermittent every 2 hours  sodium chloride 0.9%. 1000 milliLiter(s) (75 mL/Hr) IV Continuous <Continuous>    MEDICATIONS  (PRN):  acetaminophen   Tablet 650 milliGRAM(s) Oral every 6 hours PRN For Temp greater than 38 C (100.4 F)  dronabinol 2.5 milliGRAM(s) Oral two times a day PRN with meals  ondansetron Injectable 4 milliGRAM(s) IV Push every 6 hours PRN Nausea and/or Vomiting  oxyCODONE    5 mG/acetaminophen 325 mG 1 Tablet(s) Oral every 6 hours PRN Severe Pain (7 - 10)                            8.4    7.94  )-----------( 39       ( 09 May 2018 10:10 )             24.3       136  |  95<L>  |  12  ----------------------------<  148<H>  2.8<L>   |  29  |  0.9    Ca    8.5      09 May 2018 10:10  Phos  2.0     05-07  Mg     1.7     05-09    TPro  6.5  /  Alb  3.6  /  TBili  0.8  /  DBili  x   /  AST  27  /  ALT  14  /  AlkPhos  171<H>  05-07  Platelet Count - Automated : 62 K/uL  Mean Cell Volume : 83.7 fL  Mean Cell Hemoglobin : 29.0 pg  Mean Cell Hemoglobin Concentration : 34.6 g/dL  Auto Neutrophil # : 4.17 K/uL  Auto Lymphocyte # : 1.09 K/uL  Auto Monocyte # : 0.00 K/uL  Auto Eosinophil # : 0.00 K/uL  Auto Basophil # : 0.00 K/uL  Auto Neutrophil % : 58.9 %  Auto Lymphocyte % : 15.9 %  Auto Monocyte % : 0.0 %  Auto Eosinophil % : 0.0 %  Auto Basophil % : 0.0 %    05-07    135  |  91<L>  |  12  ----------------------------<  130<H>  3.4<L>   |  31  |  1.3    Ca    8.3<L>      07 May 2018 18:20  Phos  2.0     05-07  Mg     1.4     05-07    TPro  6.5  /  Alb  3.6  /  TBili  0.8  /  DBili  x   /  AST  27  /  ALT  14  /  AlkPhos  171<H>  05-07

## 2018-05-09 NOTE — PROGRESS NOTE ADULT - ASSESSMENT
75 year old female with recent diagnosis of CMML 04/2018. S/P 1 cycle of vidaza 3 weeks ago now presenting with symptomatic anemia.     1. Symptomatic Anemia:      secondary to CMML as well as Vidaza.      in total she received 2 units of prbc.      Keep hb >7 and platelets >20,000.       2. Fevers: Not related to transfusions.                No clear source of infection. so far urine culture is negative. CXR no infiltrates. Pending Blood cultures.                 will continue cefepime until we have final results.     3. Loss of appetite: on marinol.     4. DVT/GI ppx

## 2018-05-09 NOTE — CHART NOTE - NSCHARTNOTEFT_GEN_A_CORE
Upon Nutritional Assessment by the Registered Dietitian your patient was determined to meet criteria / has evidence of the following diagnosis/diagnoses:          [ ]  Mild Protein Calorie Malnutrition        [ ]  Moderate Protein Calorie Malnutrition        [x] Severe Protein Calorie Malnutrition        [ ] Unspecified Protein Calorie Malnutrition        [ ] Underweight / BMI <19        [ ] Morbid Obesity / BMI > 40      Findings as based on:  •  Comprehensive nutrition assessment and consultation    Criteria:  >5% unintentional wt loss x 1 month  meeting <75% of estimated needs x 1 month    Treatment:    Continue regular diet and appetite stimulant (intake starting to improve)  Provided diet ed on ways to prevent early satiety, while increasing nutrient dense, high protein foods  - Pt denies need for PO supplement at this time now that intake is improving, RD to re-evaluate at f/u     PROVIDER Section:     By signing this assessment you are acknowledging and agree with the diagnosis/diagnoses assigned by the Registered Dietitian    Comments:

## 2018-05-09 NOTE — PROGRESS NOTE ADULT - ASSESSMENT
76 y/o old female with a history of NHL dx 5 years ago, Chronic myelomonocytic leukemia dx 10/2017 s/p Vidaza x 1 cycle approx. 3 weeks ago, chronic thrombocytopenia with suspected bone marrow involvement ( positive flow cytometry ) , IgM paraproteinemia presented to the hospital for symptomatic anemia.    # Symptomatic Anemia  - On admission with complaints of SOB with exertion and fatigue  - hgb- 6.9 @ Dr[facility] - in hospital 6.4 s/p 2 U PRBC with Hb today of 8.4    # Fever ( in hospital) & hypotension ( in office)- r/o sepsis   - Started on cefepime   - CXR wnl, UA negative, Blood culture pending    #Hypokalemia, hypomagnesemia   - Replete today   - F/u BMP     #BRIT 2/2 prerenal  - baseline 0.6  - c/w IVF NS 75cc/hr  - Improving     #  Chronic myelomonocytic leukemia   - s/p Vidaza x 1 cycle approx. 3 weeks ago  - Not on any active chemotherapeutic agent  - Heme/ Onc following    # Chronic thrombocytopenia  - Platelet- 54 @ Dr[facility] clinic  - Transfuse if <15 or <50 if bleeding    # Hypokalemia  - repleted    # Hypomagnesemia  - repleted, f/u mag    # DVT/GI- Lovenox/ not indicated     full code    Regular diet 76 y/o old female with a history of NHL dx 5 years ago, Chronic myelomonocytic leukemia dx 10/2017 s/p Vidaza x 1 cycle approx. 3 weeks ago, chronic thrombocytopenia with suspected bone marrow involvement ( positive flow cytometry ) , IgM paraproteinemia presented to the hospital for symptomatic anemia.    # Symptomatic Anemia  - On admission with complaints of SOB with exertion and fatigue  - hgb- 6.9 @ Dr[facility] clinic - in hospital 6.4 s/p 2 U PRBC with Hb today of 8.4    # Fever ( in hospital) & hypotension ( in office)- r/o sepsis   - Started on cefepime   - CXR wnl, UA negative, Blood culture pending    #Hypokalemia, hypomagnesemia   - Replete today   - F/u BMP     #BRIT 2/2 prerenal  - baseline 0.6  - c/w IVF NS 75cc/hr  - Improving     #  Chronic myelomonocytic leukemia   - s/p Vidaza x 1 cycle approx. 3 weeks ago  - Not on any active chemotherapeutic agent  - Heme/ Onc following    # Chronic thrombocytopenia  - Platelet- 54 @ Dr[facility] clinic  - Transfuse if <15 or <50 if bleeding    # DVT/GI- Lovenox/ not indicated     full code    Regular diet 74 y/o old female with a history of NHL dx 5 years ago, Chronic myelomonocytic leukemia dx 10/2017 s/p Vidaza x 1 cycle approx. 3 weeks ago, chronic thrombocytopenia with suspected bone marrow involvement ( positive flow cytometry ) , IgM paraproteinemia presented to the hospital for symptomatic anemia.    # Symptomatic Anemia  - On admission with complaints of SOB with exertion and fatigue  - hgb- 6.9 @ Dr[facility] clinic - in hospital 6.4 s/p 2 U PRBC with Hb today of 8.4    # Fever ( in hospital) & hypotension ( in office)- r/o sepsis   - Started on cefepime   - CXR wnl, UA negative, Blood culture pending    #Hypokalemia, hypomagnesemia   - Replete today   - F/u BMP     #BRIT 2/2 prerenal  - baseline 0.6  - c/w IVF NS 75cc/hr  - Improving     #  Chronic myelomonocytic leukemia   - s/p Vidaza x 1 cycle approx. 3 weeks ago  - Not on any active chemotherapeutic agent  - Heme/ Onc following    # Chronic thrombocytopenia  - Platelet- 54 @ Dr[facility]  - Transfuse if <15 or <50 if bleeding  - Continuing to trend down  - F/u Heme Onc    # DVT/GI- Lovenox/ not indicated     full code    Regular diet

## 2018-05-10 ENCOUNTER — APPOINTMENT (OUTPATIENT)
Dept: INFUSION THERAPY | Facility: CLINIC | Age: 78
End: 2018-05-10

## 2018-05-10 ENCOUNTER — APPOINTMENT (OUTPATIENT)
Dept: HEMATOLOGY ONCOLOGY | Facility: CLINIC | Age: 78
End: 2018-05-10

## 2018-05-10 LAB
ANION GAP SERPL CALC-SCNC: 14 MMOL/L — SIGNIFICANT CHANGE UP (ref 7–14)
BASOPHILS # BLD AUTO: 0.02 K/UL — SIGNIFICANT CHANGE UP (ref 0–0.2)
BASOPHILS NFR BLD AUTO: 0.2 % — SIGNIFICANT CHANGE UP (ref 0–1)
BUN SERPL-MCNC: 15 MG/DL — SIGNIFICANT CHANGE UP (ref 10–20)
CALCIUM SERPL-MCNC: 8.7 MG/DL — SIGNIFICANT CHANGE UP (ref 8.5–10.1)
CHLORIDE SERPL-SCNC: 95 MMOL/L — LOW (ref 98–110)
CO2 SERPL-SCNC: 29 MMOL/L — SIGNIFICANT CHANGE UP (ref 17–32)
CREAT SERPL-MCNC: 1.1 MG/DL — SIGNIFICANT CHANGE UP (ref 0.7–1.5)
EOSINOPHIL # BLD AUTO: 0.03 K/UL — SIGNIFICANT CHANGE UP (ref 0–0.7)
EOSINOPHIL NFR BLD AUTO: 0.3 % — SIGNIFICANT CHANGE UP (ref 0–8)
GLUCOSE SERPL-MCNC: 144 MG/DL — HIGH (ref 70–99)
HCT VFR BLD CALC: 26.6 % — LOW (ref 37–47)
HGB BLD-MCNC: 9.1 G/DL — LOW (ref 12–16)
IMM GRANULOCYTES NFR BLD AUTO: 26.4 % — HIGH (ref 0.1–0.3)
LYMPHOCYTES # BLD AUTO: 1 K/UL — LOW (ref 1.2–3.4)
LYMPHOCYTES # BLD AUTO: 9.9 % — LOW (ref 20.5–51.1)
MAGNESIUM SERPL-MCNC: 2 MG/DL — SIGNIFICANT CHANGE UP (ref 1.8–2.4)
MCHC RBC-ENTMCNC: 28.3 PG — SIGNIFICANT CHANGE UP (ref 27–31)
MCHC RBC-ENTMCNC: 34.2 G/DL — SIGNIFICANT CHANGE UP (ref 32–37)
MCV RBC AUTO: 82.6 FL — SIGNIFICANT CHANGE UP (ref 81–99)
MONOCYTES # BLD AUTO: 2.52 K/UL — HIGH (ref 0.1–0.6)
MONOCYTES NFR BLD AUTO: 24.9 % — HIGH (ref 1.7–9.3)
NEUTROPHILS # BLD AUTO: 3.89 K/UL — SIGNIFICANT CHANGE UP (ref 1.4–6.5)
NEUTROPHILS NFR BLD AUTO: 38.3 % — LOW (ref 42.2–75.2)
NRBC # BLD: 0 /100 WBCS — SIGNIFICANT CHANGE UP (ref 0–0)
PLATELET # BLD AUTO: 41 K/UL — LOW (ref 130–400)
POTASSIUM SERPL-MCNC: 3.4 MMOL/L — LOW (ref 3.5–5)
POTASSIUM SERPL-SCNC: 3.4 MMOL/L — LOW (ref 3.5–5)
RBC # BLD: 3.22 M/UL — LOW (ref 4.2–5.4)
RBC # FLD: 18.6 % — HIGH (ref 11.5–14.5)
SODIUM SERPL-SCNC: 138 MMOL/L — SIGNIFICANT CHANGE UP (ref 135–146)
WBC # BLD: 10.14 K/UL — SIGNIFICANT CHANGE UP (ref 4.8–10.8)
WBC # FLD AUTO: 10.14 K/UL — SIGNIFICANT CHANGE UP (ref 4.8–10.8)

## 2018-05-10 RX ORDER — POTASSIUM CHLORIDE 20 MEQ
40 PACKET (EA) ORAL EVERY 4 HOURS
Qty: 0 | Refills: 0 | Status: COMPLETED | OUTPATIENT
Start: 2018-05-10 | End: 2018-05-10

## 2018-05-10 RX ADMIN — Medication 1 TABLET(S): at 11:14

## 2018-05-10 RX ADMIN — Medication 40 MILLIEQUIVALENT(S): at 18:22

## 2018-05-10 RX ADMIN — CEFEPIME 100 MILLIGRAM(S): 1 INJECTION, POWDER, FOR SOLUTION INTRAMUSCULAR; INTRAVENOUS at 11:14

## 2018-05-10 RX ADMIN — Medication 40 MILLIEQUIVALENT(S): at 15:17

## 2018-05-10 NOTE — PROGRESS NOTE ADULT - ASSESSMENT
76 y/o old female with a history of NHL dx 5 years ago, Chronic myelomonocytic leukemia dx 10/2017 s/p Vidaza x 1 cycle approx. 3 weeks ago, chronic thrombocytopenia with suspected bone marrow involvement ( positive flow cytometry ) , IgM paraproteinemia presented to the hospital for symptomatic anemia.    # Symptomatic Anemia 2/2 CMML and Vidaza   - On admission with complaints of SOB with exertion and fatigue  - hgb- 6.9 @ Dr[facility] - in hospital 6.4 s/p 2 U PRBC with Hb today 9.1    # Fever ( in hospital) & hypotension ( in office)- r/o sepsis   - Started on cefepime   - CXR wnl, UA negative, Blood culture NGTD prelim   - Will discharge once afebrile >24 Hrs.     #Hypokalemia, hypomagnesemia   - Replete today   - F/u BMP     #BRIT 2/2 prerenal  - baseline 0.6  - c/w IVF NS 75cc/hr  - Improving     #  Chronic myelomonocytic leukemia   - s/p Vidaza x 1 cycle approx. 3 weeks ago  - Not on any active chemotherapeutic agent  - Heme/ Onc following    # Chronic thrombocytopenia  - Platelet- 54 @ Dr[facility]  - Transfuse if <15 or <50 if bleeding  - Continuing to trend down  - F/u Heme Onc    # DVT/GI- Lovenox/ not indicated     full code    Regular diet     Dispo: Likely discharge tomorrow if afebrile for 24hrs

## 2018-05-10 NOTE — PROGRESS NOTE ADULT - SUBJECTIVE AND OBJECTIVE BOX
patient seen and examined.   Low grade fevers.   Non toxic appearing.   So far cultures are negative.     Vital Signs Last 24 Hrs  T(C): 37.7 (10 May 2018 05:43), Max: 38.2 (09 May 2018 21:44)  T(F): 99.9 (10 May 2018 05:43), Max: 100.8 (09 May 2018 21:44)  HR: 84 (10 May 2018 05:43) (80 - 84)  BP: 114/55 (10 May 2018 05:43) (114/55 - 132/59)  BP(mean): --  RR: 17 (10 May 2018 05:43) (17 - 18)  SpO2: --)    PHYSICAL EXAM:    Constitutional: Non toxic appearing  Respiratory: B/l BS present. Now wheezing  Cardiovascular: s1 s2 +  Gastrointestinal: soft, ND, Non tender  Extremities:  no edema  Neurological: No focal deficits. AAox 3                    MEDICATIONS  (STANDING):  cefepime   IVPB 2000 milliGRAM(s) IV Intermittent daily  enoxaparin Injectable 40 milliGRAM(s) SubCutaneous daily  magnesium sulfate  IVPB 2 Gram(s) IV Intermittent once  multivitamin 1 Tablet(s) Oral daily  potassium chloride  20 mEq/100 mL IVPB 20 milliEquivalent(s) IV Intermittent every 2 hours  sodium chloride 0.9%. 1000 milliLiter(s) (75 mL/Hr) IV Continuous <Continuous>    MEDICATIONS  (PRN):  acetaminophen   Tablet 650 milliGRAM(s) Oral every 6 hours PRN For Temp greater than 38 C (100.4 F)  dronabinol 2.5 milliGRAM(s) Oral two times a day PRN with meals  ondansetron Injectable 4 milliGRAM(s) IV Push every 6 hours PRN Nausea and/or Vomiting  oxyCODONE    5 mG/acetaminophen 325 mG 1 Tablet(s) Oral every 6 hours PRN Severe Pain (7 - 10)                            8.4    7.94  )-----------( 39       ( 09 May 2018 10:10 )             24.3       136  |  95<L>  |  12  ----------------------------<  148<H>  2.8<L>   |  29  |  0.9    Ca    8.5      09 May 2018 10:10  Phos  2.0     05-07  Mg     1.7     05-09    TPro  6.5  /  Alb  3.6  /  TBili  0.8  /  DBili  x   /  AST  27  /  ALT  14  /  AlkPhos  171<H>  05-07  Platelet Count - Automated : 62 K/uL  Mean Cell Volume : 83.7 fL  Mean Cell Hemoglobin : 29.0 pg  Mean Cell Hemoglobin Concentration : 34.6 g/dL  Auto Neutrophil # : 4.17 K/uL  Auto Lymphocyte # : 1.09 K/uL  Auto Monocyte # : 0.00 K/uL  Auto Eosinophil # : 0.00 K/uL  Auto Basophil # : 0.00 K/uL  Auto Neutrophil % : 58.9 %  Auto Lymphocyte % : 15.9 %  Auto Monocyte % : 0.0 %  Auto Eosinophil % : 0.0 %  Auto Basophil % : 0.0 %    05-07    135  |  91<L>  |  12  ----------------------------<  130<H>  3.4<L>   |  31  |  1.3    Ca    8.3<L>      07 May 2018 18:20  Phos  2.0     05-07  Mg     1.4     05-07    TPro  6.5  /  Alb  3.6  /  TBili  0.8  /  DBili  x   /  AST  27  /  ALT  14  /  AlkPhos  171<H>  05-07

## 2018-05-10 NOTE — PROGRESS NOTE ADULT - ASSESSMENT
75 year old female with recent diagnosis of CMML 04/2018. S/P 1 cycle of vidaza 3 weeks ago now presenting with symptomatic anemia.     1. Symptomatic Anemia:      secondary to CMML as well as Vidaza.      in total she received 2 units of prbc.      Keep hb >7 and platelets >20,000.       2. Fevers: Not related to transfusions.                No clear source of infection. CXR no infiltrates. So far preliminary blood cultures are negative                will continue cefepime until we have final results.     3. Loss of appetite: on marinol. Improving    4. DVT/GI ppx

## 2018-05-10 NOTE — PROGRESS NOTE ADULT - SUBJECTIVE AND OBJECTIVE BOX
SUBJECTIVE:    Patient is a 77y old Female who presents with a chief complaint of Anemia (07 May 2018 18:10)    Currently admitted to medicine with the primary diagnosis of symptomatic anemia  Today is hospital day 3d. No acute overnight events.     PAST MEDICAL & SURGICAL HISTORY  Non-Hodgkins lymphoma  S/P total abdominal hysterectomy    SOCIAL HISTORY:  Negative for smoking/alcohol/drug use.     ALLERGIES:  penicillin (Unknown)    MEDICATIONS:  STANDING MEDICATIONS  cefepime   IVPB 2000 milliGRAM(s) IV Intermittent daily  multivitamin 1 Tablet(s) Oral daily  sodium chloride 0.9%. 1000 milliLiter(s) IV Continuous <Continuous>    PRN MEDICATIONS  acetaminophen   Tablet 650 milliGRAM(s) Oral every 6 hours PRN  dronabinol 2.5 milliGRAM(s) Oral two times a day PRN  ondansetron Injectable 4 milliGRAM(s) IV Push every 6 hours PRN  oxyCODONE    5 mG/acetaminophen 325 mG 1 Tablet(s) Oral every 6 hours PRN    VITALS:   T(F): 99.9  HR: 84  BP: 114/55  RR: 17  SpO2: 94%    LABS:                        9.1    10.14 )-----------( 41       ( 10 May 2018 10:22 )             26.6     05-10    138  |  95<L>  |  15  ----------------------------<  144<H>  3.4<L>   |  29  |  1.1    Ca    8.7      10 May 2018 10:22  Mg     2.0     05-10                Culture - Blood (collected 08 May 2018 09:41)  Source: .Blood Blood-Peripheral  Preliminary Report (09 May 2018 16:01):    No growth to date.    Culture - Blood (collected 08 May 2018 09:06)  Source: .Blood None  Preliminary Report (09 May 2018 16:01):    No growth to date.    Culture - Urine (collected 08 May 2018 03:37)  Source: .Urine Clean Catch (Midstream)  Final Report (09 May 2018 08:20):    No growth          RADIOLOGY:    PHYSICAL EXAM:  GEN: No acute distress  LUNGS: Clear to auscultation bilaterally   HEART: S1/S2 present. RRR.   ABD: Soft, non-tender, non-distended. Bowel sounds present  EXT: NC/NC/NE/2+PP/CHAPMAN  NEURO: AAOX3

## 2018-05-11 ENCOUNTER — TRANSCRIPTION ENCOUNTER (OUTPATIENT)
Age: 78
End: 2018-05-11

## 2018-05-11 ENCOUNTER — APPOINTMENT (OUTPATIENT)
Dept: INFUSION THERAPY | Facility: CLINIC | Age: 78
End: 2018-05-11

## 2018-05-11 VITALS
HEART RATE: 79 BPM | SYSTOLIC BLOOD PRESSURE: 107 MMHG | DIASTOLIC BLOOD PRESSURE: 50 MMHG | RESPIRATION RATE: 18 BRPM | TEMPERATURE: 96 F

## 2018-05-11 LAB
ANION GAP SERPL CALC-SCNC: 12 MMOL/L — SIGNIFICANT CHANGE UP (ref 7–14)
BASOPHILS # BLD AUTO: 0 K/UL — SIGNIFICANT CHANGE UP (ref 0–0.2)
BASOPHILS NFR BLD AUTO: 0 % — SIGNIFICANT CHANGE UP (ref 0–1)
BLD GP AB SCN SERPL QL: SIGNIFICANT CHANGE UP
BUN SERPL-MCNC: 22 MG/DL — HIGH (ref 10–20)
CALCIUM SERPL-MCNC: 8.2 MG/DL — LOW (ref 8.5–10.1)
CHLORIDE SERPL-SCNC: 95 MMOL/L — LOW (ref 98–110)
CO2 SERPL-SCNC: 27 MMOL/L — SIGNIFICANT CHANGE UP (ref 17–32)
CREAT SERPL-MCNC: 1 MG/DL — SIGNIFICANT CHANGE UP (ref 0.7–1.5)
EOSINOPHIL # BLD AUTO: 0.03 K/UL — SIGNIFICANT CHANGE UP (ref 0–0.7)
EOSINOPHIL NFR BLD AUTO: 0.2 % — SIGNIFICANT CHANGE UP (ref 0–8)
GLUCOSE SERPL-MCNC: 106 MG/DL — HIGH (ref 70–99)
HCT VFR BLD CALC: 23 % — LOW (ref 37–47)
HGB BLD-MCNC: 7.8 G/DL — LOW (ref 12–16)
IMM GRANULOCYTES NFR BLD AUTO: 35.2 % — HIGH (ref 0.1–0.3)
LYMPHOCYTES # BLD AUTO: 1.39 K/UL — SIGNIFICANT CHANGE UP (ref 1.2–3.4)
LYMPHOCYTES # BLD AUTO: 10 % — LOW (ref 20.5–51.1)
MCHC RBC-ENTMCNC: 28.1 PG — SIGNIFICANT CHANGE UP (ref 27–31)
MCHC RBC-ENTMCNC: 33.9 G/DL — SIGNIFICANT CHANGE UP (ref 32–37)
MCV RBC AUTO: 82.7 FL — SIGNIFICANT CHANGE UP (ref 81–99)
MONOCYTES # BLD AUTO: 3.34 K/UL — HIGH (ref 0.1–0.6)
MONOCYTES NFR BLD AUTO: 24.1 % — HIGH (ref 1.7–9.3)
NEUTROPHILS # BLD AUTO: 4.21 K/UL — SIGNIFICANT CHANGE UP (ref 1.4–6.5)
NEUTROPHILS NFR BLD AUTO: 30.5 % — LOW (ref 42.2–75.2)
NRBC # BLD: 0 /100 WBCS — SIGNIFICANT CHANGE UP (ref 0–0)
PLATELET # BLD AUTO: 38 K/UL — LOW (ref 130–400)
POTASSIUM SERPL-MCNC: 3.4 MMOL/L — LOW (ref 3.5–5)
POTASSIUM SERPL-SCNC: 3.4 MMOL/L — LOW (ref 3.5–5)
RBC # BLD: 2.78 M/UL — LOW (ref 4.2–5.4)
RBC # FLD: 19 % — HIGH (ref 11.5–14.5)
SODIUM SERPL-SCNC: 134 MMOL/L — LOW (ref 135–146)
TYPE + AB SCN PNL BLD: SIGNIFICANT CHANGE UP
WBC # BLD: 13.85 K/UL — HIGH (ref 4.8–10.8)
WBC # FLD AUTO: 13.85 K/UL — HIGH (ref 4.8–10.8)

## 2018-05-11 RX ADMIN — Medication 1 TABLET(S): at 11:16

## 2018-05-11 RX ADMIN — CEFEPIME 100 MILLIGRAM(S): 1 INJECTION, POWDER, FOR SOLUTION INTRAMUSCULAR; INTRAVENOUS at 11:16

## 2018-05-11 NOTE — DISCHARGE NOTE ADULT - PATIENT PORTAL LINK FT
You can access the [facility] Patient Portal, offered by [facility], by registering with the following website: [email or web address]

## 2018-05-11 NOTE — DISCHARGE NOTE ADULT - MEDICATION SUMMARY - MEDICATIONS TO TAKE
I will START or STAY ON the medications listed below when I get home from the hospital:    levoFLOXacin 500 mg oral tablet  -- 1 tab(s) by mouth every 24 hours  -- Indication: For Fevers

## 2018-05-11 NOTE — PROGRESS NOTE ADULT - ATTENDING COMMENTS
Patient examined, above note read and modified. will discharge after transfusion . follow up on Monday . will likely start Vidaza # 2 next week .

## 2018-05-11 NOTE — DISCHARGE NOTE ADULT - CARE PLAN
Principal Discharge DX:	Anemia  Goal:	Medical Management  Assessment and plan of treatment:	Symptomatic anemia due to CML and chemo therapy. Sepsis work up negative. No source for fevers. Continue antibiotics for 5 more days. Follow up with oncology on Monday for routine blood work.  Secondary Diagnosis:	CML (chronic myelocytic leukemia)  Goal:	Medical Management  Assessment and plan of treatment:	Follow up with oncology on Monday.

## 2018-05-11 NOTE — PROGRESS NOTE ADULT - SUBJECTIVE AND OBJECTIVE BOX
patient seen and examined.   Feels good. No new complaints    Vital Signs Last 24 Hrs  T(C): 37.3 (11 May 2018 05:23), Max: 37.8 (10 May 2018 19:36)  T(F): 99.2 (11 May 2018 05:23), Max: 100 (10 May 2018 19:36)  HR: 77 (11 May 2018 05:23) (77 - 87)  BP: 106/50 (11 May 2018 05:23) (101/45 - 125/56)  BP(mean): --  RR: 17 (11 May 2018 05:23) (17 - 18)  SpO2: 94% (10 May 2018 12:03) (94% - 94%)  PHYSICAL EXAM:    Constitutional: Non toxic appearing  Respiratory: B/l BS present. Now wheezing  Cardiovascular: s1 s2 +  Gastrointestinal: soft, ND, Non tender  Extremities:  no edema  Neurological: No focal deficits. AAox 3                    MEDICATIONS  (STANDING):  cefepime   IVPB 2000 milliGRAM(s) IV Intermittent daily  enoxaparin Injectable 40 milliGRAM(s) SubCutaneous daily  magnesium sulfate  IVPB 2 Gram(s) IV Intermittent once  multivitamin 1 Tablet(s) Oral daily  potassium chloride  20 mEq/100 mL IVPB 20 milliEquivalent(s) IV Intermittent every 2 hours  sodium chloride 0.9%. 1000 milliLiter(s) (75 mL/Hr) IV Continuous <Continuous>    MEDICATIONS  (PRN):  acetaminophen   Tablet 650 milliGRAM(s) Oral every 6 hours PRN For Temp greater than 38 C (100.4 F)  dronabinol 2.5 milliGRAM(s) Oral two times a day PRN with meals  ondansetron Injectable 4 milliGRAM(s) IV Push every 6 hours PRN Nausea and/or Vomiting  oxyCODONE    5 mG/acetaminophen 325 mG 1 Tablet(s) Oral every 6 hours PRN Severe Pain (7 - 10)                              7.8    13.85 )-----------( 38       ( 11 May 2018 06:24 )             23.0   05-11    134<L>  |  95<L>  |  22<H>  ----------------------------<  106<H>  3.4<L>   |  27  |  1.0    Ca    8.2<L>      11 May 2018 06:24  Mg     2.0     05-10                            8.4    7.94  )-----------( 39       ( 09 May 2018 10:10 )             24.3       136  |  95<L>  |  12  ----------------------------<  148<H>  2.8<L>   |  29  |  0.9    Ca    8.5      09 May 2018 10:10  Phos  2.0     05-07  Mg     1.7     05-09    TPro  6.5  /  Alb  3.6  /  TBili  0.8  /  DBili  x   /  AST  27  /  ALT  14  /  AlkPhos  171<H>  05-07  Platelet Count - Automated : 62 K/uL  Mean Cell Volume : 83.7 fL  Mean Cell Hemoglobin : 29.0 pg  Mean Cell Hemoglobin Concentration : 34.6 g/dL  Auto Neutrophil # : 4.17 K/uL  Auto Lymphocyte # : 1.09 K/uL  Auto Monocyte # : 0.00 K/uL  Auto Eosinophil # : 0.00 K/uL  Auto Basophil # : 0.00 K/uL  Auto Neutrophil % : 58.9 %  Auto Lymphocyte % : 15.9 %  Auto Monocyte % : 0.0 %  Auto Eosinophil % : 0.0 %  Auto Basophil % : 0.0 %    05-07    135  |  91<L>  |  12  ----------------------------<  130<H>  3.4<L>   |  31  |  1.3    Ca    8.3<L>      07 May 2018 18:20  Phos  2.0     05-07  Mg     1.4     05-07    TPro  6.5  /  Alb  3.6  /  TBili  0.8  /  DBili  x   /  AST  27  /  ALT  14  /  AlkPhos  171<H>  05-07

## 2018-05-11 NOTE — DISCHARGE NOTE ADULT - HOSPITAL COURSE
74 y/o old female with a history of NHL dx 5 years ago, Chronic myelomonocytic leukemia dx 10/2017 s/p Vidaza x 1 cycle approx. 3 weeks ago, chronic thrombocytopenia with suspected bone marrow involvement ( positive flow cytometry ) , IgM paraproteinemia presented to the hospital for symptomatic anemia.    # Symptomatic Anemia 2/2 CMML and Vidaza   - Transfused total 3U PRBC    # Fever ( in hospital) & hypotension ( in office)- r/o sepsis   - Started on cefepime   - CXR wnl, UA negative, Blood culture NGTD prelim   - Will discharge on 5 more days of Levaquin     #BRIT 2/2 prerenal  - baseline 0.6  - Improving continue with adequate po hydration     #  Chronic myelomonocytic leukemia   - s/p Vidaza x 1 cycle approx. 3 weeks ago  - Not on any active chemotherapeutic agent  - Heme/ Onc f/u     # Chronic thrombocytopenia  - Transfuse if <15 or <50 if bleeding  - Continuing to trend down  - O/p heme onc f/u

## 2018-05-11 NOTE — DISCHARGE NOTE ADULT - PLAN OF CARE
Medical Management Symptomatic anemia due to CML and chemo therapy. Sepsis work up negative. No source for fevers. Continue antibiotics for 5 more days. Follow up with oncology on Monday for routine blood work. Follow up with oncology on Monday.

## 2018-05-11 NOTE — DISCHARGE NOTE ADULT - CARE PROVIDER_API CALL
Mitch Jackson), Internal Medicine; Medical Oncology  [address] [place] [address]  Phone: [phone]  Fax: [phone]

## 2018-05-11 NOTE — PROGRESS NOTE ADULT - ASSESSMENT
75 year old female with recent diagnosis of CMML 04/2018. S/P 1 cycle of vidaza 3 weeks ago now presenting with symptomatic anemia.     1. Symptomatic Anemia:      secondary to CMML as well as Vidaza.      in total she received 2 units of prbc.      Today Her Hb is 7.8. will transfuse her one unit today   2. Fevers: Not related to transfusions.                No clear source of infection. CXR no infiltrates. Cultures are negative to date.                 Highest temp spike was about  100 last evening.                 D/c cefepime and discharge on po abx levaquin for 5 more days.   3. Loss of appetite: on marinol. Improving    4. DVT/GI ppx    will discharge her today after blood transfusion on po abx. She needs to follow up in [facility] for repeat CBC on monday

## 2018-05-13 LAB
CULTURE RESULTS: SIGNIFICANT CHANGE UP
CULTURE RESULTS: SIGNIFICANT CHANGE UP
SPECIMEN SOURCE: SIGNIFICANT CHANGE UP
SPECIMEN SOURCE: SIGNIFICANT CHANGE UP

## 2018-05-14 ENCOUNTER — LABORATORY RESULT (OUTPATIENT)
Age: 78
End: 2018-05-14

## 2018-05-14 ENCOUNTER — APPOINTMENT (OUTPATIENT)
Dept: INFUSION THERAPY | Facility: CLINIC | Age: 78
End: 2018-05-14

## 2018-05-14 ENCOUNTER — APPOINTMENT (OUTPATIENT)
Dept: HEMATOLOGY ONCOLOGY | Facility: CLINIC | Age: 78
End: 2018-05-14

## 2018-05-14 LAB
HCT VFR BLD CALC: 28.2 %
HGB BLD-MCNC: 10.1 G/DL
MCHC RBC-ENTMCNC: 28.9 PG
MCHC RBC-ENTMCNC: 35.8 G/DL
MCV RBC AUTO: 80.8 FL
PLATELET # BLD AUTO: 33 K/UL
PMV BLD: NORMAL
RBC # BLD: 3.49 M/UL
RBC # FLD: 18.6 %
WBC # FLD AUTO: 18.88 K/UL

## 2018-05-14 RX ORDER — AZACITIDINE FOR 100 MG/1
125 INJECTION, POWDER, LYOPHILIZED, FOR SOLUTION INTRAVENOUS; SUBCUTANEOUS ONCE
Qty: 0 | Refills: 0 | Status: COMPLETED | OUTPATIENT
Start: 2018-05-14 | End: 2018-05-14

## 2018-05-14 RX ORDER — ONDANSETRON 8 MG/1
8 TABLET, FILM COATED ORAL ONCE
Qty: 0 | Refills: 0 | Status: COMPLETED | OUTPATIENT
Start: 2018-05-14 | End: 2018-05-14

## 2018-05-14 RX ORDER — ERYTHROPOIETIN 10000 [IU]/ML
40000 INJECTION, SOLUTION INTRAVENOUS; SUBCUTANEOUS ONCE
Qty: 0 | Refills: 0 | Status: COMPLETED | OUTPATIENT
Start: 2018-05-14 | End: 2018-05-14

## 2018-05-14 RX ADMIN — ERYTHROPOIETIN 40000 UNIT(S): 10000 INJECTION, SOLUTION INTRAVENOUS; SUBCUTANEOUS at 12:30

## 2018-05-14 RX ADMIN — AZACITIDINE FOR 125 MILLIGRAM(S): 100 INJECTION, POWDER, LYOPHILIZED, FOR SOLUTION INTRAVENOUS; SUBCUTANEOUS at 12:29

## 2018-05-14 RX ADMIN — ONDANSETRON 8 MILLIGRAM(S): 8 TABLET, FILM COATED ORAL at 12:30

## 2018-05-15 ENCOUNTER — APPOINTMENT (OUTPATIENT)
Dept: INFUSION THERAPY | Facility: CLINIC | Age: 78
End: 2018-05-15

## 2018-05-15 RX ORDER — AZACITIDINE FOR 100 MG/1
125 INJECTION, POWDER, LYOPHILIZED, FOR SOLUTION INTRAVENOUS; SUBCUTANEOUS ONCE
Qty: 0 | Refills: 0 | Status: COMPLETED | OUTPATIENT
Start: 2018-05-15 | End: 2018-05-15

## 2018-05-15 RX ORDER — ONDANSETRON 8 MG/1
8 TABLET, FILM COATED ORAL ONCE
Qty: 0 | Refills: 0 | Status: COMPLETED | OUTPATIENT
Start: 2018-05-15 | End: 2018-05-15

## 2018-05-15 RX ADMIN — AZACITIDINE FOR 125 MILLIGRAM(S): 100 INJECTION, POWDER, LYOPHILIZED, FOR SOLUTION INTRAVENOUS; SUBCUTANEOUS at 13:41

## 2018-05-15 RX ADMIN — ONDANSETRON 8 MILLIGRAM(S): 8 TABLET, FILM COATED ORAL at 13:11

## 2018-05-16 ENCOUNTER — APPOINTMENT (OUTPATIENT)
Dept: INFUSION THERAPY | Facility: CLINIC | Age: 78
End: 2018-05-16

## 2018-05-16 RX ORDER — ONDANSETRON 8 MG/1
8 TABLET, FILM COATED ORAL ONCE
Qty: 0 | Refills: 0 | Status: COMPLETED | OUTPATIENT
Start: 2018-05-16 | End: 2018-05-16

## 2018-05-16 RX ORDER — AZACITIDINE FOR 100 MG/1
125 INJECTION, POWDER, LYOPHILIZED, FOR SOLUTION INTRAVENOUS; SUBCUTANEOUS ONCE
Qty: 0 | Refills: 0 | Status: COMPLETED | OUTPATIENT
Start: 2018-05-16 | End: 2018-05-16

## 2018-05-16 RX ADMIN — AZACITIDINE FOR 125 MILLIGRAM(S): 100 INJECTION, POWDER, LYOPHILIZED, FOR SOLUTION INTRAVENOUS; SUBCUTANEOUS at 13:23

## 2018-05-16 RX ADMIN — ONDANSETRON 8 MILLIGRAM(S): 8 TABLET, FILM COATED ORAL at 12:28

## 2018-05-17 ENCOUNTER — APPOINTMENT (OUTPATIENT)
Dept: INFUSION THERAPY | Facility: CLINIC | Age: 78
End: 2018-05-17

## 2018-05-17 ENCOUNTER — APPOINTMENT (OUTPATIENT)
Dept: HEMATOLOGY ONCOLOGY | Facility: CLINIC | Age: 78
End: 2018-05-17

## 2018-05-17 ENCOUNTER — LABORATORY RESULT (OUTPATIENT)
Age: 78
End: 2018-05-17

## 2018-05-17 VITALS
SYSTOLIC BLOOD PRESSURE: 129 MMHG | TEMPERATURE: 98.7 F | HEART RATE: 90 BPM | DIASTOLIC BLOOD PRESSURE: 60 MMHG | HEIGHT: 62 IN | RESPIRATION RATE: 16 BRPM | WEIGHT: 137 LBS | BODY MASS INDEX: 25.21 KG/M2

## 2018-05-17 RX ORDER — AZACITIDINE FOR 100 MG/1
125 INJECTION, POWDER, LYOPHILIZED, FOR SOLUTION INTRAVENOUS; SUBCUTANEOUS ONCE
Qty: 0 | Refills: 0 | Status: COMPLETED | OUTPATIENT
Start: 2018-05-17 | End: 2018-05-17

## 2018-05-17 RX ORDER — ONDANSETRON 8 MG/1
8 TABLET, FILM COATED ORAL ONCE
Qty: 0 | Refills: 0 | Status: COMPLETED | OUTPATIENT
Start: 2018-05-17 | End: 2018-05-17

## 2018-05-17 RX ADMIN — ONDANSETRON 8 MILLIGRAM(S): 8 TABLET, FILM COATED ORAL at 14:50

## 2018-05-17 RX ADMIN — AZACITIDINE FOR 125 MILLIGRAM(S): 100 INJECTION, POWDER, LYOPHILIZED, FOR SOLUTION INTRAVENOUS; SUBCUTANEOUS at 14:50

## 2018-05-18 ENCOUNTER — APPOINTMENT (OUTPATIENT)
Dept: INFUSION THERAPY | Facility: CLINIC | Age: 78
End: 2018-05-18

## 2018-05-18 LAB
HCT VFR BLD CALC: 27.5 %
HGB BLD-MCNC: 9.8 G/DL
MCHC RBC-ENTMCNC: 28.7 PG
MCHC RBC-ENTMCNC: 35.6 G/DL
MCV RBC AUTO: 80.6 FL
PLATELET # BLD AUTO: 34 K/UL
PMV BLD: NORMAL
RBC # BLD: 3.41 M/UL
RBC # FLD: 19.4 %
WBC # FLD AUTO: 38.96 K/UL

## 2018-05-18 RX ORDER — ONDANSETRON 8 MG/1
8 TABLET, FILM COATED ORAL ONCE
Qty: 0 | Refills: 0 | Status: COMPLETED | OUTPATIENT
Start: 2018-05-18 | End: 2018-05-18

## 2018-05-18 RX ORDER — AZACITIDINE FOR 100 MG/1
125 INJECTION, POWDER, LYOPHILIZED, FOR SOLUTION INTRAVENOUS; SUBCUTANEOUS ONCE
Qty: 0 | Refills: 0 | Status: COMPLETED | OUTPATIENT
Start: 2018-05-18 | End: 2018-05-18

## 2018-05-18 RX ADMIN — ONDANSETRON 8 MILLIGRAM(S): 8 TABLET, FILM COATED ORAL at 11:43

## 2018-05-18 RX ADMIN — AZACITIDINE FOR 125 MILLIGRAM(S): 100 INJECTION, POWDER, LYOPHILIZED, FOR SOLUTION INTRAVENOUS; SUBCUTANEOUS at 11:43

## 2018-05-21 ENCOUNTER — APPOINTMENT (OUTPATIENT)
Dept: HEMATOLOGY ONCOLOGY | Facility: CLINIC | Age: 78
End: 2018-05-21

## 2018-05-21 ENCOUNTER — APPOINTMENT (OUTPATIENT)
Dept: INFUSION THERAPY | Facility: CLINIC | Age: 78
End: 2018-05-21

## 2018-05-21 ENCOUNTER — OTHER (OUTPATIENT)
Age: 78
End: 2018-05-21

## 2018-05-21 ENCOUNTER — LABORATORY RESULT (OUTPATIENT)
Age: 78
End: 2018-05-21

## 2018-05-21 LAB
HCT VFR BLD CALC: 23.9 %
HGB BLD-MCNC: 8.8 G/DL
MCHC RBC-ENTMCNC: 29.2 PG
MCHC RBC-ENTMCNC: 36.8 G/DL
MCV RBC AUTO: 79.4 FL
PLATELET # BLD AUTO: 22 K/UL
PMV BLD: NORMAL
RBC # BLD: 3.01 M/UL
RBC # FLD: 19.9 %
WBC # FLD AUTO: 47.98 K/UL

## 2018-05-21 RX ORDER — AZACITIDINE FOR 100 MG/1
125 INJECTION, POWDER, LYOPHILIZED, FOR SOLUTION INTRAVENOUS; SUBCUTANEOUS ONCE
Qty: 0 | Refills: 0 | Status: COMPLETED | OUTPATIENT
Start: 2018-05-21 | End: 2018-05-21

## 2018-05-21 RX ORDER — ONDANSETRON 8 MG/1
8 TABLET, FILM COATED ORAL ONCE
Qty: 0 | Refills: 0 | Status: COMPLETED | OUTPATIENT
Start: 2018-05-21 | End: 2018-05-21

## 2018-05-21 RX ORDER — ERYTHROPOIETIN 10000 [IU]/ML
40000 INJECTION, SOLUTION INTRAVENOUS; SUBCUTANEOUS ONCE
Qty: 0 | Refills: 0 | Status: COMPLETED | OUTPATIENT
Start: 2018-05-21 | End: 2018-05-21

## 2018-05-21 RX ADMIN — AZACITIDINE FOR 125 MILLIGRAM(S): 100 INJECTION, POWDER, LYOPHILIZED, FOR SOLUTION INTRAVENOUS; SUBCUTANEOUS at 12:35

## 2018-05-21 RX ADMIN — ONDANSETRON 8 MILLIGRAM(S): 8 TABLET, FILM COATED ORAL at 12:35

## 2018-05-21 RX ADMIN — ERYTHROPOIETIN 40000 UNIT(S): 10000 INJECTION, SOLUTION INTRAVENOUS; SUBCUTANEOUS at 12:35

## 2018-05-22 ENCOUNTER — APPOINTMENT (OUTPATIENT)
Dept: INFUSION THERAPY | Facility: CLINIC | Age: 78
End: 2018-05-22

## 2018-05-22 DIAGNOSIS — N17.9 ACUTE KIDNEY FAILURE, UNSPECIFIED: ICD-10-CM

## 2018-05-22 DIAGNOSIS — Z88.0 ALLERGY STATUS TO PENICILLIN: ICD-10-CM

## 2018-05-22 DIAGNOSIS — I95.9 HYPOTENSION, UNSPECIFIED: ICD-10-CM

## 2018-05-22 DIAGNOSIS — R50.9 FEVER, UNSPECIFIED: ICD-10-CM

## 2018-05-22 DIAGNOSIS — D69.6 THROMBOCYTOPENIA, UNSPECIFIED: ICD-10-CM

## 2018-05-22 DIAGNOSIS — E87.6 HYPOKALEMIA: ICD-10-CM

## 2018-05-22 DIAGNOSIS — E43 UNSPECIFIED SEVERE PROTEIN-CALORIE MALNUTRITION: ICD-10-CM

## 2018-05-22 DIAGNOSIS — E83.42 HYPOMAGNESEMIA: ICD-10-CM

## 2018-05-22 DIAGNOSIS — Z90.710 ACQUIRED ABSENCE OF BOTH CERVIX AND UTERUS: ICD-10-CM

## 2018-05-22 DIAGNOSIS — D63.0 ANEMIA IN NEOPLASTIC DISEASE: ICD-10-CM

## 2018-05-22 DIAGNOSIS — Z85.72 PERSONAL HISTORY OF NON-HODGKIN LYMPHOMAS: ICD-10-CM

## 2018-05-22 DIAGNOSIS — C93.10 CHRONIC MYELOMONOCYTIC LEUKEMIA NOT HAVING ACHIEVED REMISSION: ICD-10-CM

## 2018-05-22 DIAGNOSIS — D64.81 ANEMIA DUE TO ANTINEOPLASTIC CHEMOTHERAPY: ICD-10-CM

## 2018-05-22 DIAGNOSIS — D64.9 ANEMIA, UNSPECIFIED: ICD-10-CM

## 2018-05-22 RX ORDER — ONDANSETRON 8 MG/1
8 TABLET, FILM COATED ORAL ONCE
Qty: 0 | Refills: 0 | Status: COMPLETED | OUTPATIENT
Start: 2018-05-22 | End: 2018-05-22

## 2018-05-22 RX ORDER — AZACITIDINE FOR 100 MG/1
125 INJECTION, POWDER, LYOPHILIZED, FOR SOLUTION INTRAVENOUS; SUBCUTANEOUS ONCE
Qty: 0 | Refills: 0 | Status: COMPLETED | OUTPATIENT
Start: 2018-05-22 | End: 2018-05-22

## 2018-05-22 RX ADMIN — ONDANSETRON 8 MILLIGRAM(S): 8 TABLET, FILM COATED ORAL at 15:22

## 2018-05-22 RX ADMIN — AZACITIDINE FOR 125 MILLIGRAM(S): 100 INJECTION, POWDER, LYOPHILIZED, FOR SOLUTION INTRAVENOUS; SUBCUTANEOUS at 15:27

## 2018-05-24 ENCOUNTER — LABORATORY RESULT (OUTPATIENT)
Age: 78
End: 2018-05-24

## 2018-05-24 ENCOUNTER — APPOINTMENT (OUTPATIENT)
Dept: HEMATOLOGY ONCOLOGY | Facility: CLINIC | Age: 78
End: 2018-05-24

## 2018-05-24 LAB
ABO + RH PNL BLD: NORMAL
BLD GP AB SCN SERPL QL: NORMAL
HCT VFR BLD CALC: 19.5 %
HGB BLD-MCNC: 7.1 G/DL
MCHC RBC-ENTMCNC: 28.9 PG
MCHC RBC-ENTMCNC: 36.4 G/DL
MCV RBC AUTO: 79.3 FL
PLATELET # BLD AUTO: 16 K/UL
PMV BLD: NORMAL
RBC # BLD: 2.46 M/UL
RBC # FLD: 19.9 %
WBC # FLD AUTO: 53.95 K/UL

## 2018-05-25 ENCOUNTER — APPOINTMENT (OUTPATIENT)
Dept: INFUSION THERAPY | Facility: CLINIC | Age: 78
End: 2018-05-25

## 2018-05-27 ENCOUNTER — INPATIENT (INPATIENT)
Facility: HOSPITAL | Age: 78
LOS: 3 days | Discharge: HOME | End: 2018-05-31
Attending: INTERNAL MEDICINE | Admitting: INTERNAL MEDICINE
Payer: MEDICARE

## 2018-05-27 VITALS
SYSTOLIC BLOOD PRESSURE: 100 MMHG | OXYGEN SATURATION: 98 % | HEART RATE: 72 BPM | TEMPERATURE: 97 F | RESPIRATION RATE: 18 BRPM | DIASTOLIC BLOOD PRESSURE: 56 MMHG

## 2018-05-27 DIAGNOSIS — N20.0 CALCULUS OF KIDNEY: ICD-10-CM

## 2018-05-27 DIAGNOSIS — Z90.710 ACQUIRED ABSENCE OF BOTH CERVIX AND UTERUS: Chronic | ICD-10-CM

## 2018-05-27 DIAGNOSIS — C93.10 CHRONIC MYELOMONOCYTIC LEUKEMIA NOT HAVING ACHIEVED REMISSION: ICD-10-CM

## 2018-05-27 DIAGNOSIS — E87.6 HYPOKALEMIA: ICD-10-CM

## 2018-05-27 DIAGNOSIS — D69.6 THROMBOCYTOPENIA, UNSPECIFIED: ICD-10-CM

## 2018-05-27 LAB
ALBUMIN SERPL ELPH-MCNC: 3.1 G/DL — LOW (ref 3.5–5.2)
ALP SERPL-CCNC: 110 U/L — SIGNIFICANT CHANGE UP (ref 30–115)
ALT FLD-CCNC: 16 U/L — SIGNIFICANT CHANGE UP (ref 0–41)
ANION GAP SERPL CALC-SCNC: 11 MMOL/L — SIGNIFICANT CHANGE UP (ref 7–14)
ANION GAP SERPL CALC-SCNC: 13 MMOL/L — SIGNIFICANT CHANGE UP (ref 7–14)
APPEARANCE UR: (no result)
APTT BLD: 29.8 SEC — SIGNIFICANT CHANGE UP (ref 27–39.2)
AST SERPL-CCNC: 25 U/L — SIGNIFICANT CHANGE UP (ref 0–41)
BASOPHILS # BLD AUTO: 0 K/UL — SIGNIFICANT CHANGE UP (ref 0–0.2)
BASOPHILS # BLD AUTO: 0.04 K/UL — SIGNIFICANT CHANGE UP (ref 0–0.2)
BASOPHILS NFR BLD AUTO: 0 % — SIGNIFICANT CHANGE UP (ref 0–1)
BASOPHILS NFR BLD AUTO: 0.1 % — SIGNIFICANT CHANGE UP (ref 0–1)
BILIRUB SERPL-MCNC: 3 MG/DL — HIGH (ref 0.2–1.2)
BILIRUB UR-MCNC: NEGATIVE — SIGNIFICANT CHANGE UP
BUN SERPL-MCNC: 31 MG/DL — HIGH (ref 10–20)
BUN SERPL-MCNC: 33 MG/DL — HIGH (ref 10–20)
CALCIUM SERPL-MCNC: 7.6 MG/DL — LOW (ref 8.5–10.1)
CALCIUM SERPL-MCNC: 7.7 MG/DL — LOW (ref 8.5–10.1)
CHLORIDE SERPL-SCNC: 87 MMOL/L — LOW (ref 98–110)
CHLORIDE SERPL-SCNC: 91 MMOL/L — LOW (ref 98–110)
CO2 SERPL-SCNC: 31 MMOL/L — SIGNIFICANT CHANGE UP (ref 17–32)
CO2 SERPL-SCNC: 31 MMOL/L — SIGNIFICANT CHANGE UP (ref 17–32)
COLOR SPEC: YELLOW — SIGNIFICANT CHANGE UP
CREAT SERPL-MCNC: 1.2 MG/DL — SIGNIFICANT CHANGE UP (ref 0.7–1.5)
CREAT SERPL-MCNC: 1.4 MG/DL — SIGNIFICANT CHANGE UP (ref 0.7–1.5)
DIFF PNL FLD: (no result)
EOSINOPHIL # BLD AUTO: 0 K/UL — SIGNIFICANT CHANGE UP (ref 0–0.7)
EOSINOPHIL # BLD AUTO: 0.02 K/UL — SIGNIFICANT CHANGE UP (ref 0–0.7)
EOSINOPHIL NFR BLD AUTO: 0 % — SIGNIFICANT CHANGE UP (ref 0–8)
EOSINOPHIL NFR BLD AUTO: 0.1 % — SIGNIFICANT CHANGE UP (ref 0–8)
GLUCOSE SERPL-MCNC: 111 MG/DL — HIGH (ref 70–99)
GLUCOSE SERPL-MCNC: 164 MG/DL — HIGH (ref 70–99)
GLUCOSE UR QL: NEGATIVE MG/DL — SIGNIFICANT CHANGE UP
HCT VFR BLD CALC: 21.6 % — LOW (ref 37–47)
HCT VFR BLD CALC: 22.9 % — LOW (ref 37–47)
HGB BLD-MCNC: 7.8 G/DL — LOW (ref 12–16)
HGB BLD-MCNC: 8.4 G/DL — LOW (ref 12–16)
IMM GRANULOCYTES NFR BLD AUTO: 16.5 % — HIGH (ref 0.1–0.3)
INR BLD: 1.76 RATIO — HIGH (ref 0.65–1.3)
KETONES UR-MCNC: NEGATIVE — SIGNIFICANT CHANGE UP
LACTATE SERPL-SCNC: 1.6 MMOL/L — SIGNIFICANT CHANGE UP (ref 0.5–2.2)
LEUKOCYTE ESTERASE UR-ACNC: NEGATIVE — SIGNIFICANT CHANGE UP
LIDOCAIN IGE QN: 88 U/L — HIGH (ref 7–60)
LYMPHOCYTES # BLD AUTO: 19 % — LOW (ref 20.5–51.1)
LYMPHOCYTES # BLD AUTO: 2.22 K/UL — SIGNIFICANT CHANGE UP (ref 1.2–3.4)
LYMPHOCYTES # BLD AUTO: 4.72 K/UL — HIGH (ref 1.2–3.4)
LYMPHOCYTES # BLD AUTO: 8.3 % — LOW (ref 20.5–51.1)
MAGNESIUM SERPL-MCNC: 1.4 MG/DL — LOW (ref 1.8–2.4)
MCHC RBC-ENTMCNC: 29.1 PG — SIGNIFICANT CHANGE UP (ref 27–31)
MCHC RBC-ENTMCNC: 29.2 PG — SIGNIFICANT CHANGE UP (ref 27–31)
MCHC RBC-ENTMCNC: 36.1 G/DL — SIGNIFICANT CHANGE UP (ref 32–37)
MCHC RBC-ENTMCNC: 36.7 G/DL — SIGNIFICANT CHANGE UP (ref 32–37)
MCV RBC AUTO: 79.2 FL — LOW (ref 81–99)
MCV RBC AUTO: 80.9 FL — LOW (ref 81–99)
MONOCYTES # BLD AUTO: 10.41 K/UL — HIGH (ref 0.1–0.6)
MONOCYTES # BLD AUTO: 7.2 K/UL — HIGH (ref 0.1–0.6)
MONOCYTES NFR BLD AUTO: 29 % — HIGH (ref 1.7–9.3)
MONOCYTES NFR BLD AUTO: 38.9 % — HIGH (ref 1.7–9.3)
NEUTROPHILS # BLD AUTO: 6.45 K/UL — SIGNIFICANT CHANGE UP (ref 1.4–6.5)
NEUTROPHILS # BLD AUTO: 9.64 K/UL — HIGH (ref 1.4–6.5)
NEUTROPHILS NFR BLD AUTO: 20 % — LOW (ref 42.2–75.2)
NEUTROPHILS NFR BLD AUTO: 36.1 % — LOW (ref 42.2–75.2)
NITRITE UR-MCNC: NEGATIVE — SIGNIFICANT CHANGE UP
PH UR: 5.5 — SIGNIFICANT CHANGE UP (ref 5–8)
PHOSPHATE SERPL-MCNC: 4.2 MG/DL — SIGNIFICANT CHANGE UP (ref 2.1–4.9)
PLATELET # BLD AUTO: 10 K/UL — CRITICAL LOW (ref 130–400)
PLATELET # BLD AUTO: 15 K/UL — CRITICAL LOW (ref 130–400)
POTASSIUM SERPL-MCNC: 2.5 MMOL/L — CRITICAL LOW (ref 3.5–5)
POTASSIUM SERPL-MCNC: 3.8 MMOL/L — SIGNIFICANT CHANGE UP (ref 3.5–5)
POTASSIUM SERPL-SCNC: 2.5 MMOL/L — CRITICAL LOW (ref 3.5–5)
POTASSIUM SERPL-SCNC: 3.8 MMOL/L — SIGNIFICANT CHANGE UP (ref 3.5–5)
PROT SERPL-MCNC: 5.2 G/DL — LOW (ref 6–8)
PROT UR-MCNC: 30 MG/DL
PROTHROM AB SERPL-ACNC: 19.2 SEC — HIGH (ref 9.95–12.87)
RBC # BLD: 2.67 M/UL — LOW (ref 4.2–5.4)
RBC # BLD: 2.89 M/UL — LOW (ref 4.2–5.4)
RBC # FLD: 18.3 % — HIGH (ref 11.5–14.5)
RBC # FLD: 19 % — HIGH (ref 11.5–14.5)
SODIUM SERPL-SCNC: 131 MMOL/L — LOW (ref 135–146)
SODIUM SERPL-SCNC: 133 MMOL/L — LOW (ref 135–146)
SP GR SPEC: 1.01 — SIGNIFICANT CHANGE UP (ref 1.01–1.03)
TYPE + AB SCN PNL BLD: SIGNIFICANT CHANGE UP
URATE SERPL-MCNC: 8.9 MG/DL — HIGH (ref 2.5–7)
UROBILINOGEN FLD QL: 1 MG/DL (ref 0.2–0.2)
WBC # BLD: 24.82 K/UL — HIGH (ref 4.8–10.8)
WBC # BLD: 26.75 K/UL — HIGH (ref 4.8–10.8)
WBC # FLD AUTO: 24.82 K/UL — HIGH (ref 4.8–10.8)
WBC # FLD AUTO: 26.75 K/UL — HIGH (ref 4.8–10.8)

## 2018-05-27 RX ORDER — ALLOPURINOL 300 MG
300 TABLET ORAL DAILY
Qty: 0 | Refills: 0 | Status: DISCONTINUED | OUTPATIENT
Start: 2018-05-27 | End: 2018-05-31

## 2018-05-27 RX ORDER — MORPHINE SULFATE 50 MG/1
2 CAPSULE, EXTENDED RELEASE ORAL ONCE
Qty: 0 | Refills: 0 | Status: DISCONTINUED | OUTPATIENT
Start: 2018-05-27 | End: 2018-05-27

## 2018-05-27 RX ORDER — OXYCODONE AND ACETAMINOPHEN 5; 325 MG/1; MG/1
1 TABLET ORAL EVERY 4 HOURS
Qty: 0 | Refills: 0 | Status: DISCONTINUED | OUTPATIENT
Start: 2018-05-27 | End: 2018-05-27

## 2018-05-27 RX ORDER — POTASSIUM CHLORIDE 20 MEQ
20 PACKET (EA) ORAL ONCE
Qty: 0 | Refills: 0 | Status: COMPLETED | OUTPATIENT
Start: 2018-05-27 | End: 2018-05-27

## 2018-05-27 RX ORDER — MAGNESIUM SULFATE 500 MG/ML
2 VIAL (ML) INJECTION ONCE
Qty: 0 | Refills: 0 | Status: COMPLETED | OUTPATIENT
Start: 2018-05-27 | End: 2018-05-27

## 2018-05-27 RX ORDER — SODIUM CHLORIDE 9 MG/ML
1000 INJECTION, SOLUTION INTRAVENOUS ONCE
Qty: 0 | Refills: 0 | Status: COMPLETED | OUTPATIENT
Start: 2018-05-27 | End: 2018-05-27

## 2018-05-27 RX ORDER — POTASSIUM CHLORIDE 20 MEQ
40 PACKET (EA) ORAL ONCE
Qty: 0 | Refills: 0 | Status: COMPLETED | OUTPATIENT
Start: 2018-05-27 | End: 2018-05-27

## 2018-05-27 RX ORDER — ONDANSETRON 8 MG/1
4 TABLET, FILM COATED ORAL
Qty: 0 | Refills: 0 | Status: DISCONTINUED | OUTPATIENT
Start: 2018-05-27 | End: 2018-05-31

## 2018-05-27 RX ORDER — ACETAMINOPHEN 500 MG
650 TABLET ORAL EVERY 6 HOURS
Qty: 0 | Refills: 0 | Status: DISCONTINUED | OUTPATIENT
Start: 2018-05-27 | End: 2018-05-31

## 2018-05-27 RX ADMIN — OXYCODONE AND ACETAMINOPHEN 1 TABLET(S): 5; 325 TABLET ORAL at 17:25

## 2018-05-27 RX ADMIN — Medication 300 MILLIGRAM(S): at 23:11

## 2018-05-27 RX ADMIN — SODIUM CHLORIDE 3000 MILLILITER(S): 9 INJECTION, SOLUTION INTRAVENOUS at 12:00

## 2018-05-27 RX ADMIN — Medication 40 MILLIEQUIVALENT(S): at 15:14

## 2018-05-27 RX ADMIN — Medication 50 MILLIEQUIVALENT(S): at 16:55

## 2018-05-27 RX ADMIN — MORPHINE SULFATE 2 MILLIGRAM(S): 50 CAPSULE, EXTENDED RELEASE ORAL at 15:14

## 2018-05-27 RX ADMIN — MORPHINE SULFATE 2 MILLIGRAM(S): 50 CAPSULE, EXTENDED RELEASE ORAL at 15:45

## 2018-05-27 RX ADMIN — Medication 50 GRAM(S): at 23:11

## 2018-05-27 RX ADMIN — OXYCODONE AND ACETAMINOPHEN 1 TABLET(S): 5; 325 TABLET ORAL at 17:55

## 2018-05-27 NOTE — ED PROVIDER NOTE - OBJECTIVE STATEMENT
76 yo M hx of BRIANNA, lymphoma and leukemia, anemia s/p transfusions in the past p/w days of abd and back pain. no n/v/d/f/c.

## 2018-05-27 NOTE — ED PROVIDER NOTE - PHYSICAL EXAMINATION
AOx4, Non toxic appearing, NAD. Skin  warm and dry, no acute rash. PERRLA/EOM, conjunctiva and sclera clear. MM moist, no nasal discharge.  Pharynx and TM's unremarkable. Neck supple nt, no meningeal signs. Heart RRR s1s2 nl, no rub/murmur. Lungs- BS equal, CTAB. Abdomen soft mild rlq ttp no r/g. Extremities- moves all, +equal distal pulses, sensation wnl, normal ROM. No LE edema, calves nttp b/l.

## 2018-05-27 NOTE — H&P ADULT - ATTENDING COMMENTS
76yo F with Past Medical History of Chronic Myelomonocytic Leukemia on Vidaza and prior history of chemotherapy induced neutropenia and anemia admitted for worsening abdominal pain.  Her pain was associated with right lower back pain, increased urinary frequency, and the sensation of incomplete voiding.  The patient recently completed the 2nd cycle (7 days) of Vidaza.  Following her chemotherapy, the patient developed worsening anemia and required 2u PRBC transfusion.    Abdominal Pain/Right Lower Back Pain secondary to acute nephrolithiasis: multiple small stones are identified on CT Abdomen/Pelvis.  Continue aggressive IV hydration with Oxycodone or Morphine PRN.  No need for antibiotics at this time.  UA was negative for leukocytes or nitrites.  Check blood/urine cultures and start abx if patient develops fevers.  Add Flomax.    Hypokalemia: likely secondary to recent chemotherapy.  Replete potassium and Magnesium and repeat electrolyte studies tomorrow.    Anemia/Thrombocytopenia: status post blood transfusiion.  Plt count remains borderline, monitor daily CBC and transfuse plt if levels worsen <15.    CMML: status post chemotherapy.  Continue supportive care with Allopurinol.    GI/DVT prophylaxis

## 2018-05-27 NOTE — ED PROVIDER NOTE - PROGRESS NOTE DETAILS
Case discussed with hem/onc fellow Dr. Lakhani at time of admission. Recommended admit for pain control and observation given several stones, UA discussed, requested no antibiotics be given and hem/onc to follow. Aware of platelets (stable) and WBC (trending downward).

## 2018-05-27 NOTE — H&P ADULT - NSHPLABSRESULTS_GEN_ALL_CORE
< from: CT Abdomen and Pelvis w/ IV Cont (05.27.18 @ 14:09) >    IMPRESSION:     Mild right hydroureteronephrosis leading to a 2 mm distal right ureteral   calculus and a 3 mm right ureterovesicular junction calculus.     Bilateral nonobstructing renal calculi measuring up to 4 mm.    8 x 5 mm calculus seen within the bladder abutting the left   ureterovesicular junction. Other smaller hypodense liver foci are seen   along the left posterior wall of the urinary bladder. No definite   hydroureteronephrosis on the left.    Marked splenomegaly consistent with known history of lymphoma.     Mild ascites within the pelvis.    < end of copied text >

## 2018-05-27 NOTE — H&P ADULT - HISTORY OF PRESENT ILLNESS
This is the case of a 76 YO woman presenting to the ED for the above CC.  History started with the acute onset of a sharp right sided flank pain, moderate in intensity, that radiated to the right lower quadrant with associated nausea but no vomiting. She also had associated urinary frequency but no dysuria or change in the color of urine.  She denies any recent fevers or chills.   The patient has a history of CMML for which she received her second cycle of Vidaza for 7 days that ended 5 days ago. The patient was noted to have anemia 2 days prior to presentation for which she received 2 units of pRBC. Following her first cycle of Vidaza the patient had worsening of her chronic thrombocytopenia and anemia for which she was admitted to the hospital.

## 2018-05-27 NOTE — H&P ADULT - PROBLEM SELECTOR PLAN 3
Chronic thrombocytopenia that was worsened by recent chemotherapy  Will recheck CBC in PM and transfuse with 1 unit of platelets if count is <15

## 2018-05-27 NOTE — H&P ADULT - PMH
Chronic myelomonocytic leukemia not having achieved remission    Non-Hodgkins lymphoma    Thrombocytopenia

## 2018-05-27 NOTE — ED PROVIDER NOTE - NS ED ROS FT
Pt denied fvr/chills, HA, visual changes, dizziness, light headedness, presyncope, LOC, CP, HAILE, PND, SOB, cough, hemoptysis, abd pain, n/v/d, changes in bowel or bladder habits, LE edema, numbness, weakness, changes in gait.  The pt also denied recent travel outside of the country, recent illnesses, sick contacts, recent airplane trips or periods of immobility/bedbound.

## 2018-05-27 NOTE — H&P ADULT - ASSESSMENT
76 YO woman with a Hx of CMML on Vidaza, last cycle 1 week ago presenting with right flank pain due to kidney stones. The pain is likely due to the larger stone (5x8mm) that already passed into the bladder.  The patient was also found to have severe thrombocytopenia and hypokalemia.

## 2018-05-27 NOTE — ED PROVIDER NOTE - ATTENDING CONTRIBUTION TO CARE
76 yo female with PMH lymphoma presents c/o left flank pain that started this AM. Pt Denies any fevers. No trauma or falls. No CP, SOB or palpitations. 78 yo female with PMH lymphoma (CMML), anemia presents c/o left flank pain that started this AM. Pt denies any fevers. No trauma or falls. No CP, SOB or palpitations. Pain radiated to groin. Reported some suprapubic pressure. + nausea. No V/D.     VITAL SIGNS: noted  CONSTITUTIONAL: Elderly, frail, no acute distress  HEAD: Normocephalic; atraumatic  EYES: PERRL, EOM intact; conjunctiva and sclera clear  CARD: S1, S2 normal; no murmurs, gallops, or rubs. Regular rate and rhythm  RESP: CTAB/L, no wheezes, rales or rhonchi  ABD: Normal bowel sounds; soft; non-distended; +LLQ tenderness, no rebound, + mild suprapubic ttp   EXT: Normal ROM. Distal pulses intact  NEURO: Alert, oriented. Grossly unremarkable. No focal deficits

## 2018-05-27 NOTE — H&P ADULT - NSHPPHYSICALEXAM_GEN_ALL_CORE
Vital Signs Last 24 Hrs  T(C): 35.9 (27 May 2018 16:08), Max: 36.3 (27 May 2018 10:52)  T(F): 96.7 (27 May 2018 16:08), Max: 97.3 (27 May 2018 10:52)  HR: 77 (27 May 2018 16:08) (72 - 77)  BP: 98/51 (27 May 2018 16:08) (95/54 - 131/60)  BP(mean): --  RR: 18 (27 May 2018 16:08) (18 - 18)  SpO2: 100% (27 May 2018 16:08) (98% - 100%)    GENERAL APPEARANCE:  alert and cooperative, and appears to be in no acute distress.  CARDIAC: RRR, Normal S1 and S2. No S3, S4 or murmurs  LUNGS: Clear to auscultation without rales, rhonchi or wheezing.  ABDOMEN: Positive bowel sounds. Soft, nondistended, nontender, mild right CVA tenderness. No guarding or rebound. No HSM.  EXTREMITIES: Mild 1+ pitting bilateral LLE, no calf tenderness. Peripheral pulses intact. No varicosities.

## 2018-05-28 DIAGNOSIS — E83.42 HYPOMAGNESEMIA: ICD-10-CM

## 2018-05-28 DIAGNOSIS — D64.9 ANEMIA, UNSPECIFIED: ICD-10-CM

## 2018-05-28 LAB
ANION GAP SERPL CALC-SCNC: 8 MMOL/L — SIGNIFICANT CHANGE UP (ref 7–14)
BASOPHILS # BLD AUTO: 0.01 K/UL — SIGNIFICANT CHANGE UP (ref 0–0.2)
BASOPHILS # BLD AUTO: 0.06 K/UL — SIGNIFICANT CHANGE UP (ref 0–0.2)
BASOPHILS NFR BLD AUTO: 0.1 % — SIGNIFICANT CHANGE UP (ref 0–1)
BASOPHILS NFR BLD AUTO: 0.3 % — SIGNIFICANT CHANGE UP (ref 0–1)
BUN SERPL-MCNC: 34 MG/DL — HIGH (ref 10–20)
CALCIUM SERPL-MCNC: 7.8 MG/DL — LOW (ref 8.5–10.1)
CHLORIDE SERPL-SCNC: 92 MMOL/L — LOW (ref 98–110)
CO2 SERPL-SCNC: 31 MMOL/L — SIGNIFICANT CHANGE UP (ref 17–32)
CREAT SERPL-MCNC: 1.5 MG/DL — SIGNIFICANT CHANGE UP (ref 0.7–1.5)
CULTURE RESULTS: NO GROWTH — SIGNIFICANT CHANGE UP
EOSINOPHIL # BLD AUTO: 0 K/UL — SIGNIFICANT CHANGE UP (ref 0–0.7)
EOSINOPHIL # BLD AUTO: 0.01 K/UL — SIGNIFICANT CHANGE UP (ref 0–0.7)
EOSINOPHIL NFR BLD AUTO: 0 % — SIGNIFICANT CHANGE UP (ref 0–8)
EOSINOPHIL NFR BLD AUTO: 0.1 % — SIGNIFICANT CHANGE UP (ref 0–8)
GLUCOSE SERPL-MCNC: 109 MG/DL — HIGH (ref 70–99)
HCT VFR BLD CALC: 17.6 % — LOW (ref 37–47)
HCT VFR BLD CALC: 25.7 % — LOW (ref 37–47)
HGB BLD-MCNC: 6.4 G/DL — CRITICAL LOW (ref 12–16)
HGB BLD-MCNC: 8.9 G/DL — LOW (ref 12–16)
IMM GRANULOCYTES NFR BLD AUTO: 18.6 % — HIGH (ref 0.1–0.3)
IMM GRANULOCYTES NFR BLD AUTO: 21.3 % — HIGH (ref 0.1–0.3)
LYMPHOCYTES # BLD AUTO: 1.69 K/UL — SIGNIFICANT CHANGE UP (ref 1.2–3.4)
LYMPHOCYTES # BLD AUTO: 11.1 % — LOW (ref 20.5–51.1)
LYMPHOCYTES # BLD AUTO: 2.07 K/UL — SIGNIFICANT CHANGE UP (ref 1.2–3.4)
LYMPHOCYTES # BLD AUTO: 8.6 % — LOW (ref 20.5–51.1)
MAGNESIUM SERPL-MCNC: 1.9 MG/DL — SIGNIFICANT CHANGE UP (ref 1.8–2.4)
MCHC RBC-ENTMCNC: 28.9 PG — SIGNIFICANT CHANGE UP (ref 27–31)
MCHC RBC-ENTMCNC: 29.9 PG — SIGNIFICANT CHANGE UP (ref 27–31)
MCHC RBC-ENTMCNC: 34.6 G/DL — SIGNIFICANT CHANGE UP (ref 32–37)
MCHC RBC-ENTMCNC: 36.4 G/DL — SIGNIFICANT CHANGE UP (ref 32–37)
MCV RBC AUTO: 82.2 FL — SIGNIFICANT CHANGE UP (ref 81–99)
MCV RBC AUTO: 83.4 FL — SIGNIFICANT CHANGE UP (ref 81–99)
MONOCYTES # BLD AUTO: 6.43 K/UL — HIGH (ref 0.1–0.6)
MONOCYTES # BLD AUTO: 6.8 K/UL — HIGH (ref 0.1–0.6)
MONOCYTES NFR BLD AUTO: 32.6 % — HIGH (ref 1.7–9.3)
MONOCYTES NFR BLD AUTO: 36.5 % — HIGH (ref 1.7–9.3)
NEUTROPHILS # BLD AUTO: 5.8 K/UL — SIGNIFICANT CHANGE UP (ref 1.4–6.5)
NEUTROPHILS # BLD AUTO: 7.87 K/UL — HIGH (ref 1.4–6.5)
NEUTROPHILS NFR BLD AUTO: 31 % — LOW (ref 42.2–75.2)
NEUTROPHILS NFR BLD AUTO: 39.8 % — LOW (ref 42.2–75.2)
PLATELET # BLD AUTO: 33 K/UL — LOW (ref 130–400)
PLATELET # BLD AUTO: 35 K/UL — LOW (ref 130–400)
POTASSIUM SERPL-MCNC: 3.6 MMOL/L — SIGNIFICANT CHANGE UP (ref 3.5–5)
POTASSIUM SERPL-SCNC: 3.6 MMOL/L — SIGNIFICANT CHANGE UP (ref 3.5–5)
RBC # BLD: 2.14 M/UL — LOW (ref 4.2–5.4)
RBC # BLD: 3.08 M/UL — LOW (ref 4.2–5.4)
RBC # FLD: 19 % — HIGH (ref 11.5–14.5)
RBC # FLD: 19.2 % — HIGH (ref 11.5–14.5)
SODIUM SERPL-SCNC: 131 MMOL/L — LOW (ref 135–146)
SPECIMEN SOURCE: SIGNIFICANT CHANGE UP
WBC # BLD: 18.65 K/UL — HIGH (ref 4.8–10.8)
WBC # BLD: 19.74 K/UL — HIGH (ref 4.8–10.8)
WBC # FLD AUTO: 18.65 K/UL — HIGH (ref 4.8–10.8)
WBC # FLD AUTO: 19.74 K/UL — HIGH (ref 4.8–10.8)

## 2018-05-28 RX ORDER — OXYCODONE AND ACETAMINOPHEN 5; 325 MG/1; MG/1
1 TABLET ORAL ONCE
Qty: 0 | Refills: 0 | Status: DISCONTINUED | OUTPATIENT
Start: 2018-05-28 | End: 2018-05-28

## 2018-05-28 RX ADMIN — Medication 650 MILLIGRAM(S): at 13:17

## 2018-05-28 RX ADMIN — OXYCODONE AND ACETAMINOPHEN 1 TABLET(S): 5; 325 TABLET ORAL at 04:41

## 2018-05-28 RX ADMIN — Medication 300 MILLIGRAM(S): at 11:11

## 2018-05-28 RX ADMIN — OXYCODONE AND ACETAMINOPHEN 1 TABLET(S): 5; 325 TABLET ORAL at 02:47

## 2018-05-28 RX ADMIN — Medication 650 MILLIGRAM(S): at 14:17

## 2018-05-28 NOTE — PROGRESS NOTE ADULT - ASSESSMENT
78 YO woman with a Hx of CMML on Vidaza, last cycle 1 week ago presenting with right flank pain due to kidney stones. The pain is likely due to the larger stone (5x8mm) that already passed into the bladder.  The patient was also found to have severe thrombocytopenia and hypokalemia. 76 YO woman with a Hx of CMML on Vidaza, last cycle 1 week ago presenting with right flank pain due to kidney stones. The pain is likely due to the larger stone (5x8mm) that already passed into the bladder.  The patient was also found to have severe thrombocytopenia and hypokalemia.    Attending Attestation:    Pt seen and examined. Chart reviewed and case and plan discussed with Resident and patient at bedside. Pt with CML on Vidaza, p/w Thrombocytopenia / Anemia / Uric Acid Kidney Stones due to ChemoRx induced Hyperuresimia on Allopurinol.     Plan:  Transfuse plateletes for platelete count less than 15K  Transfuse PRBC for hg less than 7 grams  CBC Daily  Increase Allopurinol dose  Monitor for Fever given transfusions and CLL  Heme Onc Consult - Dr Manjinder PANTOJA Consult  Sonogram KUB to re-asssess hydronephrosis  IVF NS 75cc/hr  Continue all other care and medications per orders    Time for all care 35min 78 YO woman with a Hx of CMML on Vidaza, last cycle 1 week ago presenting with right flank pain due to kidney stones. The pain is likely due to the larger stone (5x8mm) that already passed into the bladder.  The patient was also found to have severe thrombocytopenia and hypokalemia.    Attending Attestation:    Pt seen and examined. Chart reviewed and case and plan discussed with Resident and patient at bedside. Pt with CML on Vidaza, p/w Thrombocytopenia / Anemia / Uric Acid Kidney Stones due to ChemoRx induced Hyperuresimia on Allopurinol.     Plan:  Transfuse plateletes for platelete count less than 15K  Transfuse PRBC for hg less than 7 grams  CBC Daily  Increase Allopurinol dose  Monitor for Fever given transfusions and CLL  Heme Onc Consult - Dr Manjinder PANTOJA Consult  Sonogram KUB to re-asssess hydronephrosis  IVF NS 75cc/hr  Continue all other care and medications per orders    Time for all care 35min

## 2018-05-28 NOTE — PROGRESS NOTE ADULT - PROBLEM SELECTOR PLAN 5
On chemo, WBC decreased compared to 3 days ago  continue with allopurinol for now  follow up with Dr. Jackson Chronic thrombocytopenia that was worsened by recent chemotherapy  transfuse with 1 unit of platelets if count is <15  s/p one unit of platelet transfusion last night  platelet count: 33 today

## 2018-05-28 NOTE — PROGRESS NOTE ADULT - PROBLEM SELECTOR PLAN 3
hypomagnesemia last night and repleted  follow up stat add-on lab today Most likely due to Vidaza,  repleted  K WNL today

## 2018-05-28 NOTE — PROGRESS NOTE ADULT - PROBLEM SELECTOR PLAN 4
Chronic thrombocytopenia that was worsened by recent chemotherapy  transfuse with 1 unit of platelets if count is <15  s/p one unit of platelet transfusion last night  platelet count: 33 today hypomagnesemia last night and repleted  follow up stat add-on lab today

## 2018-05-28 NOTE — PROGRESS NOTE ADULT - PROBLEM SELECTOR PLAN 2
Most likely due to Vidaza,  repleted  K WNL today anemia likely 2/2 chemotherapy  one unit pRBC transfusion ordered today  monitor cbc

## 2018-05-28 NOTE — PATIENT PROFILE ADULT. - NS PRO PT RIGHT SUPPORT PERSON
Phone call from patient. Informed of EGD results. Continue with gluten free diet and follow up with Dr Alberts in 1 year.   Declines

## 2018-05-28 NOTE — PROGRESS NOTE ADULT - PROBLEM SELECTOR PLAN 6
On chemo, WBC decreased compared to 3 days ago  continue with allopurinol for now  follow up with Dr. Jackson

## 2018-05-28 NOTE — PROGRESS NOTE ADULT - SUBJECTIVE AND OBJECTIVE BOX
GALA PHILLIPS    Patient is a 77y old  Female who presents with a chief complaint of Abdominal pain since the morning (27 May 2018 17:55)      Interval History/Overnight events: Pt reports that right flank pain completely resolved and no more nausea.    T(C): 37.2 (18 @ 07:54), Max: 37.2 (18 @ 07:54)  HR: 67 (18 @ 07:54)  BP: 103/46 (18 @ 07:54)  RR: 18 (18 @ 07:54)  SpO2: 97% (18 @ 07:54)    PHYSICAL EXAM:    GENERAL: sitting up comfortably. in no distress  HEENT: Anicteric sclera, EOMI  CHEST/LUNG: Clear to auscultation bilaterally  HEART: Regular rate and rhythm; No murmurs, rubs, or gallops  ABDOMEN: Soft, Nontender, Nondistended; Bowel sounds present. No CVA tenderness  EXTREMITIES: 2+ pitting edema      LABS:                          6.4    18.65 )-----------( 33       ( 28 May 2018 05:34 )             17.6     05-28    131<L>  |  92<L>  |  34<H>  ----------------------------<  109<H>  3.6   |  31  |  1.5    Ca    7.8<L>      28 May 2018 05:34  Phos  4.2       Mg     1.4         TPro  5.2<L>  /  Alb  3.1<L>  /  TBili  3.0<H>  /  DBili  x   /  AST  25  /  ALT  16  /  AlkPhos  110  -27    PT/INR - ( 27 May 2018 12:20 )   PT: 19.20 sec;   INR: 1.76 ratio         PTT - ( 27 May 2018 12:20 )  PTT:29.8 sec        Urinalysis Basic - ( 27 May 2018 12:20 )    Color: Yellow / Appearance: Cloudy / S.015 / pH: x  Gluc: x / Ketone: Negative  / Bili: Negative / Urobili: 1.0 mg/dL   Blood: x / Protein: 30 mg/dL / Nitrite: Negative   Leuk Esterase: Negative / RBC: x / WBC 1-2 /HPF   Sq Epi: x / Non Sq Epi: x / Bacteria: x      LIVER FUNCTIONS - ( 27 May 2018 12:20 )  Alb: 3.1 g/dL / Pro: 5.2 g/dL / ALK PHOS: 110 U/L / ALT: 16 U/L / AST: 25 U/L / GGT: x             MEDICATIONS:    MEDICATIONS  (STANDING):  allopurinol 300 milliGRAM(s) Oral daily      MEDICATIONS  (PRN):  acetaminophen   Tablet. 650 milliGRAM(s) Oral every 6 hours PRN Mild Pain (1 - 3)  ondansetron Injectable 4 milliGRAM(s) IV Push two times a day PRN Nausea GALA PHILLIPS    Patient is a 77y old  Female who presents with a chief complaint of Abdominal pain since the morning (27 May 2018 17:55)      Interval History/Overnight events: Pt reports that right flank pain completely resolved and no more nausea.    T(C): 37.2 (18 @ 07:54), Max: 37.2 (18 @ 07:54)  HR: 67 (18 @ 07:54)  BP: 103/46 (18 @ 07:54)  RR: 18 (18 @ 07:54)  SpO2: 97% (18 @ 07:54)    PHYSICAL EXAM:    GENERAL: sitting up comfortably. in no distress  HEENT: Anicteric sclera, EOMI  CHEST/LUNG: Clear to auscultation bilaterally  HEART: Regular rate and rhythm; No murmurs, rubs, or gallops  ABDOMEN: Soft, Nontender, Nondistended; Bowel sounds present. No CVA tenderness  EXTREMITIES: 2+ pitting edema      LABS:                          6.4    18.65 )-----------( 33       ( 28 May 2018 05:34 )             17.6     05-28    131<L>  |  92<L>  |  34<H>  ----------------------------<  109<H>  3.6   |  31  |  1.5    Ca    7.8<L>      28 May 2018 05:34  Phos  4.2       Mg     1.4         TPro  5.2<L>  /  Alb  3.1<L>  /  TBili  3.0<H>  /  DBili  x   /  AST  25  /  ALT  16  /  AlkPhos  110  05-27    PT/INR - ( 27 May 2018 12:20 )   PT: 19.20 sec;   INR: 1.76 ratio       PTT - ( 27 May 2018 12:20 )  PTT:29.8 sec    CT A/P with IV contrast    IMPRESSION:     Mild right hydroureteronephrosis leading to a 2 mm distal right ureteral   calculus and a 3 mm right ureterovesicular junction calculus.     Bilateral nonobstructing renal calculi measuring up to 4 mm.    8 x 5 mm calculus seen within the bladder abutting the left   ureterovesicular junction. Other smaller hypodense liver foci are seen   along the left posterior wall of the urinary bladder. No definite   hydroureteronephrosis on the left.    Marked splenomegaly consistent with known history of lymphoma.     Mild ascites within the pelvis.      Urinalysis Basic - ( 27 May 2018 12:20 )    Color: Yellow / Appearance: Cloudy / S.015 / pH: x  Gluc: x / Ketone: Negative  / Bili: Negative / Urobili: 1.0 mg/dL   Blood: x / Protein: 30 mg/dL / Nitrite: Negative   Leuk Esterase: Negative / RBC: x / WBC 1-2 /HPF   Sq Epi: x / Non Sq Epi: x / Bacteria: x    LIVER FUNCTIONS - ( 27 May 2018 12:20 )  Alb: 3.1 g/dL / Pro: 5.2 g/dL / ALK PHOS: 110 U/L / ALT: 16 U/L / AST: 25 U/L / GGT: x           MEDICATIONS  (STANDING):  allopurinol 300 milliGRAM(s) Oral daily    MEDICATIONS  (PRN):  acetaminophen   Tablet. 650 milliGRAM(s) Oral every 6 hours PRN Mild Pain (1 - 3)  ondansetron Injectable 4 milliGRAM(s) IV Push two times a day PRN Nausea

## 2018-05-29 ENCOUNTER — APPOINTMENT (OUTPATIENT)
Dept: HEMATOLOGY ONCOLOGY | Facility: CLINIC | Age: 78
End: 2018-05-29

## 2018-05-29 ENCOUNTER — APPOINTMENT (OUTPATIENT)
Dept: INFUSION THERAPY | Facility: CLINIC | Age: 78
End: 2018-05-29

## 2018-05-29 LAB
ANION GAP SERPL CALC-SCNC: 11 MMOL/L — SIGNIFICANT CHANGE UP (ref 7–14)
BASOPHILS # BLD AUTO: 0.02 K/UL — SIGNIFICANT CHANGE UP (ref 0–0.2)
BASOPHILS NFR BLD AUTO: 0.2 % — SIGNIFICANT CHANGE UP (ref 0–1)
BUN SERPL-MCNC: 30 MG/DL — HIGH (ref 10–20)
CALCIUM SERPL-MCNC: 8 MG/DL — LOW (ref 8.5–10.1)
CHLORIDE SERPL-SCNC: 94 MMOL/L — LOW (ref 98–110)
CO2 SERPL-SCNC: 32 MMOL/L — SIGNIFICANT CHANGE UP (ref 17–32)
CREAT SERPL-MCNC: 1.3 MG/DL — SIGNIFICANT CHANGE UP (ref 0.7–1.5)
EOSINOPHIL # BLD AUTO: 0.01 K/UL — SIGNIFICANT CHANGE UP (ref 0–0.7)
EOSINOPHIL NFR BLD AUTO: 0.1 % — SIGNIFICANT CHANGE UP (ref 0–8)
GLUCOSE SERPL-MCNC: 105 MG/DL — HIGH (ref 70–99)
HCT VFR BLD CALC: 23.5 % — LOW (ref 37–47)
HGB BLD-MCNC: 8.1 G/DL — LOW (ref 12–16)
IMM GRANULOCYTES NFR BLD AUTO: 19.8 % — HIGH (ref 0.1–0.3)
LYMPHOCYTES # BLD AUTO: 1.77 K/UL — SIGNIFICANT CHANGE UP (ref 1.2–3.4)
LYMPHOCYTES # BLD AUTO: 13.7 % — LOW (ref 20.5–51.1)
MAGNESIUM SERPL-MCNC: 1.6 MG/DL — LOW (ref 1.8–2.4)
MCHC RBC-ENTMCNC: 29.2 PG — SIGNIFICANT CHANGE UP (ref 27–31)
MCHC RBC-ENTMCNC: 34.5 G/DL — SIGNIFICANT CHANGE UP (ref 32–37)
MCV RBC AUTO: 84.8 FL — SIGNIFICANT CHANGE UP (ref 81–99)
MONOCYTES # BLD AUTO: 3.27 K/UL — HIGH (ref 0.1–0.6)
MONOCYTES NFR BLD AUTO: 25.3 % — HIGH (ref 1.7–9.3)
NEUTROPHILS # BLD AUTO: 5.31 K/UL — SIGNIFICANT CHANGE UP (ref 1.4–6.5)
NEUTROPHILS NFR BLD AUTO: 40.9 % — LOW (ref 42.2–75.2)
PLATELET # BLD AUTO: 19 K/UL — CRITICAL LOW (ref 130–400)
POTASSIUM SERPL-MCNC: 3.3 MMOL/L — LOW (ref 3.5–5)
POTASSIUM SERPL-SCNC: 3.3 MMOL/L — LOW (ref 3.5–5)
RBC # BLD: 2.77 M/UL — LOW (ref 4.2–5.4)
RBC # FLD: 19.5 % — HIGH (ref 11.5–14.5)
SODIUM SERPL-SCNC: 137 MMOL/L — SIGNIFICANT CHANGE UP (ref 135–146)
WBC # BLD: 12.94 K/UL — HIGH (ref 4.8–10.8)
WBC # FLD AUTO: 12.94 K/UL — HIGH (ref 4.8–10.8)

## 2018-05-29 PROCEDURE — 99222 1ST HOSP IP/OBS MODERATE 55: CPT

## 2018-05-29 RX ORDER — MAGNESIUM SULFATE 500 MG/ML
1 VIAL (ML) INJECTION ONCE
Qty: 0 | Refills: 0 | Status: COMPLETED | OUTPATIENT
Start: 2018-05-29 | End: 2018-05-29

## 2018-05-29 RX ORDER — SODIUM CHLORIDE 9 MG/ML
1000 INJECTION, SOLUTION INTRAVENOUS
Qty: 0 | Refills: 0 | Status: DISCONTINUED | OUTPATIENT
Start: 2018-05-29 | End: 2018-05-31

## 2018-05-29 RX ORDER — POTASSIUM CHLORIDE 20 MEQ
20 PACKET (EA) ORAL ONCE
Qty: 0 | Refills: 0 | Status: COMPLETED | OUTPATIENT
Start: 2018-05-29 | End: 2018-05-29

## 2018-05-29 RX ADMIN — Medication 300 MILLIGRAM(S): at 13:24

## 2018-05-29 RX ADMIN — Medication 20 MILLIEQUIVALENT(S): at 18:51

## 2018-05-29 RX ADMIN — SODIUM CHLORIDE 75 MILLILITER(S): 9 INJECTION, SOLUTION INTRAVENOUS at 15:31

## 2018-05-29 RX ADMIN — Medication 100 GRAM(S): at 21:13

## 2018-05-29 NOTE — PROGRESS NOTE ADULT - SUBJECTIVE AND OBJECTIVE BOX
SUBJECTIVE:    Patient is a 77y old Female who presents with a chief complaint of Abdominal pain since the morning (27 May 2018 17:55)    Currently admitted to medicine with the primary diagnosis of Kidney stone     Today is hospital day 2d. This morning she is resting comfortably in bed and reports no new issues or overnight events.     PAST MEDICAL & SURGICAL HISTORY  Thrombocytopenia  Chronic myelomonocytic leukemia not having achieved remission  Non-Hodgkins lymphoma  S/P total abdominal hysterectomy    SOCIAL HISTORY:  Negative for smoking/alcohol/drug use.     ALLERGIES:  penicillin (Unknown)    MEDICATIONS:  STANDING MEDICATIONS  allopurinol 300 milliGRAM(s) Oral daily    PRN MEDICATIONS  acetaminophen   Tablet. 650 milliGRAM(s) Oral every 6 hours PRN  ondansetron Injectable 4 milliGRAM(s) IV Push two times a day PRN    VITALS:   T(F): 97  HR: 64  BP: 108/60  RR: 18  SpO2: 99%    LABS:                        8.9    19.74 )-----------( 35       ( 28 May 2018 18:14 )             25.7     05-28    131<L>  |  92<L>  |  34<H>  ----------------------------<  109<H>  3.6   |  31  |  1.5    Ca    7.8<L>      28 May 2018 05:34  Phos  4.2     -  Mg     1.9         TPro  5.2<L>  /  Alb  3.1<L>  /  TBili  3.0<H>  /  DBili  x   /  AST  25  /  ALT  16  /  AlkPhos  110  05-    PT/INR - ( 27 May 2018 12:20 )   PT: 19.20 sec;   INR: 1.76 ratio         PTT - ( 27 May 2018 12:20 )  PTT:29.8 sec  Urinalysis Basic - ( 27 May 2018 12:20 )    Color: Yellow / Appearance: Cloudy / S.015 / pH: x  Gluc: x / Ketone: Negative  / Bili: Negative / Urobili: 1.0 mg/dL   Blood: x / Protein: 30 mg/dL / Nitrite: Negative   Leuk Esterase: Negative / RBC: x / WBC 1-2 /HPF   Sq Epi: x / Non Sq Epi: x / Bacteria: x            Culture - Urine (collected 27 May 2018 12:20)  Source: .Urine Clean Catch (Midstream)  Final Report (28 May 2018 21:59):    No growth    PHYSICAL EXAM:  GEN: No acute distress  LUNGS: Clear to auscultation bilaterally   HEART: S1/S2 present. RRR.   ABD: Soft, non-tender, non-distended. Bowel sounds present  NEURO: AAOX3 SUBJECTIVE:    Patient is a 77y old Female who presents with a chief complaint of Abdominal pain since the morning (27 May 2018 17:55)    Currently admitted to medicine with the primary diagnosis of Kidney stone     Today is hospital day 2d. This morning she is resting comfortably in bed and reports no new issues or overnight events.     PAST MEDICAL & SURGICAL HISTORY  Thrombocytopenia  Chronic myelomonocytic leukemia not having achieved remission  Non-Hodgkins lymphoma  S/P total abdominal hysterectomy    SOCIAL HISTORY:  Negative for smoking/alcohol/drug use.     ALLERGIES:  penicillin (Unknown)    MEDICATIONS:  STANDING MEDICATIONS  allopurinol 300 milliGRAM(s) Oral daily    PRN MEDICATIONS  acetaminophen   Tablet. 650 milliGRAM(s) Oral every 6 hours PRN  ondansetron Injectable 4 milliGRAM(s) IV Push two times a day PRN    VITALS:   T(F): 97  HR: 64  BP: 108/60  RR: 18  SpO2: 99%    LABS:                        8.9    19.74 )-----------( 35       ( 28 May 2018 18:14 )             25.7     05-28    131<L>  |  92<L>  |  34<H>  ----------------------------<  109<H>  3.6   |  31  |  1.5    Ca    7.8<L>      28 May 2018 05:34  Phos  4.2     -  Mg     1.9         TPro  5.2<L>  /  Alb  3.1<L>  /  TBili  3.0<H>  /  DBili  x   /  AST  25  /  ALT  16  /  AlkPhos  110  05-    PT/INR - ( 27 May 2018 12:20 )   PT: 19.20 sec;   INR: 1.76 ratio         PTT - ( 27 May 2018 12:20 )  PTT:29.8 sec  Urinalysis Basic - ( 27 May 2018 12:20 )    Color: Yellow / Appearance: Cloudy / S.015 / pH: x  Gluc: x / Ketone: Negative  / Bili: Negative / Urobili: 1.0 mg/dL   Blood: x / Protein: 30 mg/dL / Nitrite: Negative   Leuk Esterase: Negative / RBC: x / WBC 1-2 /HPF   Sq Epi: x / Non Sq Epi: x / Bacteria: x            Culture - Urine (collected 27 May 2018 12:20)  Source: .Urine Clean Catch (Midstream)  Final Report (28 May 2018 21:59):    No growth    PHYSICAL EXAM:  GEN: No acute distress  Pulm: Clear to auscultation bilaterally   CV: S1/S2 present. RRR.   GI: Soft, non-tender, non-distended. Bowel sounds present  NEURO: AAOX3  skin: no rash

## 2018-05-29 NOTE — PROGRESS NOTE ADULT - ASSESSMENT
78 YO woman with a Hx of CMML on Vidaza, last cycle 1 week ago presenting with right flank pain due to kidney stones. The pain is likely due to the larger stone (5x8mm) that already passed into the bladder.  The patient was also found to have severe thrombocytopenia and hypokalemia.    # Kidney stone, large stone mostly likely passed to bladder  - pain and nausea resolved  - c/w allopurinol  - U/A negative  - urology consulted for stone      # Normocytic anemia, anemia likely 2/2 chemotherapy  - stable  - will monitor cbc.     # Hypokalemia, Most likely due to Vidaza,  - resolved, will replete accordingly    # Hypomagnesemia, hypomagnesemia last night and repleted  - will follow up  - resolved    # Thrombocytopenia, Chronic thrombocytopenia that was worsened by recent chemotherapy  - chronic, heme/onc consulted  - stable, will monitor    # Chronic myelomonocytic leukemia not having achieved remission  - continue with allopurinol for now  - follow up with Dr. Jackson and heme/onc    Disoo: home with outpatient follow up. 78 YO woman with a Hx of CMML on Vidaza, last cycle 1 week ago presenting with right flank pain due to kidney stones. The pain is likely due to the larger stone (5x8mm) that already passed into the bladder.  The patient was also found to have severe thrombocytopenia and hypokalemia.    # nephrolithiasis , large stone mostly likely passed to bladder  - pain and nausea resolved  - c/w allopurinol  - U/A negative  - urology consulted for stone  -urine culture negative       # Normocytic anemia, anemia likely 2/2 chemotherapy  - stable  - will monitor cbc.     # Hypokalemia, Most likely due to Vidaza,  - resolved, will replete accordingly    # Hypomagnesemia, hypomagnesemia last night and repleted  - will follow up  - resolved    # Thrombocytopenia, Chronic thrombocytopenia that was worsened by recent chemotherapy  - chronic, heme/onc consulted  - stable, will monitor    # Chronic myelomonocytic leukemia not having achieved remission  - continue with allopurinol for now  - follow up with Dr. Jackson and heme/onc    Disoo: home with outpatient follow up.

## 2018-05-29 NOTE — CONSULT NOTE ADULT - ASSESSMENT
77 year old Female with mild R hydronephrosis 2* 2mm R distal calculus, 3mm R UVJ calculus with additional 8mm L UVJ calculus. WBC 12 (from 26 w/o abx), urine cx negative, Cr 1.5.    PLAN:  1.	Renal bladder sono, check for ureteral jets  2.	Start sodium bicarb 650mg q8H, keep urine pH >6.5  3.	IVF hydration  4.	. 77 year old Female with mild R hydronephrosis 2* 2mm R distal calculus, 3mm R UVJ calculus with additional 8mm L UVJ calculus -- all uric acid stones. WBC 12 (from 26 w/o abx), urine cx negative, Cr 1.5.    PLAN:  1.	Renal bladder sono, check for ureteral jets  2.	Stones are uric acid, needs alkalinization of urine. Start sodium bicarb 650mg q8H, keep urine pH >6.5  3.	IVF hydration  4.	. 77 year old Female with mild R hydronephrosis 2* 2mm R distal calculus, 3mm R UVJ calculus with additional 8mm L UVJ vs bladder calculus -- uric acid stones (pt on Visaza for CML). WBC 12 (from 26 w/o abx), urine cx negative, Cr 1.5, pain free.     PLAN:  1.	Keep NPO  2.	Renal bladder sono -- check for ureteral jets & L stone location  3.	Pt neds alkalinization of urine -- can start sodium bicarb to keep urine pH >6.5. Send UA q8H & adjust accordingly  4.	IVF hydration  5.	Will follow 77 year old Female with mild R hydronephrosis 2* 2mm R distal calculus, 3mm R UVJ calculus with additional 8mm L UVJ vs bladder calculus -- uric acid stones (pt on Visaza for CML). WBC 12 (from 26 w/o abx), urine cx negative, Cr 1.5, pain free.     PLAN:  1.	Keep NPO  2.	Renal bladder sono -- check for ureteral jets & L stone location  3.	Pt neds alkalinization of urine -- can start sodium bicarb to keep urine pH >6.5. Send UA q8H & adjust accordingly  4.	Cont Allopurinol  5.	IVF hydration  6.	Will follow

## 2018-05-29 NOTE — CONSULT NOTE ADULT - ATTENDING COMMENTS
Films reviewed with radiology to my view I cant be sure where the left stone is  recommend and discussed with Attending in charge ofi her care stat sono with oblique postioning looking to see if stone moves and to look for jets  as well start allopurinol as stone UA by Mary as well as alkalinization  ph 5.5 - 6.0  and if 6.5-7.0 stones may dissolve  leave NPO until after sono and then decide  pt prefers no surgery Films reviewed with radiology to my view I cant be sure where the left stone is  recommend and discussed with Attending in charge of her care stat sono with oblique postioning looking to see if stone moves and to look for jets  as well start allopurinol as stone UA by Mary as well as alkalinization  ph 5.5 - 6.0  and if 6.5-7.0 stones may dissolve  leave NPO until after sono and then decide  pt prefers no surgery

## 2018-05-29 NOTE — CONSULT NOTE ADULT - SUBJECTIVE AND OBJECTIVE BOX
77y old Female      PAST MEDICAL & SURGICAL HISTORY:  Thrombocytopenia  Chronic myelomonocytic leukemia not having achieved remission  Non-Hodgkins lymphoma  S/P total abdominal hysterectomy    MEDS: acetaminophen   Tablet. 650 milliGRAM(s) PRN  allopurinol 300 milliGRAM(s)  ondansetron Injectable 4 milliGRAM(s) PRN   MEDICATIONS  (STANDING):  allopurinol 300 milliGRAM(s) Oral daily    ALLERGIES: penicillin (Unknown)    VS: T(F): 97, Max: 98.2 ( @ 20:00), HR: 64 (64 - 73), BP: 108/60 (84/37 - 128/55), RR: 18, SpO2: 99% (96% - 99%)  GEN: Alert, awake, NAD  ABD:    LABS/IMAGIN.1    12.94 )-----------( 19       ( 29 May 2018 06:50 )             23.5     05-28    131<L>  |  92<L>  |  34<H>  ----------------------------<  109<H>  3.6   |  31  |  1.5    Ca    7.8<L>      28 May 2018 05:34  Phos  4.2     05-27  Mg     1.9     -    TPro  5.2<L>  /  Alb  3.1<L>  /  TBili  3.0<H>  /  DBili  x   /  AST  25  /  ALT  16  /  AlkPhos  110  05-27    PT/INR - ( 27 May 2018 12:20 )   PT: 19.20 sec;   INR: 1.76 ratio  PTT - ( 27 May 2018 12:20 )  PTT:29.8 sec    < from: CT Abdomen and Pelvis w/ IV Cont (18 @ 14:09) >  KIDNEYS: Normal renal enhancement bilaterally. 4 mm nonobstructing   calculus at the lower pole of the right kidney. There is a left   interpolar region cortical scar seen posteriorly. Mild right   hydroureteronephrosis. There is a 2 mm right distal ureteral calculus. A   second calculus measuring 3 mm is seen at the right ureterovesicular   junction. There is a 3 mm nonobstructing calculus at the lower pole left   kidney.  Multiple pelvic phleboliths seen.  PELVIC ORGANS: Status post hysterectomy. Urinary bladder is collapsed.   There is an 8 x 5 mm calculus within the bladder abutting the left   ureterovesicular junction. Other smaller hyperdense layering foci are   seen along the left posterior wall of the bladder (4:323). Mild ascites   seen in the pelvis.    Urinalysis Basic - ( 27 May 2018 12:20 )  Color: Yellow / Appearance: Cloudy / S.015 / pH: x  Gluc: x / Ketone: Negative  / Bili: Negative / Urobili: 1.0 mg/dL   Blood: x / Protein: 30 mg/dL / Nitrite: Negative   Leuk Esterase: Negative / RBC: x / WBC 1-2 /HPF   Sq Epi: x / Non Sq Epi: x / Bacteria: x    Culture - Urine (collected 27 May 2018 12:20)  Source: .Urine Clean Catch (Midstream)  Final Report (28 May 2018 21:59):    No growth 77y old Female      PAST MEDICAL & SURGICAL HISTORY:  Thrombocytopenia  Chronic myelomonocytic leukemia not having achieved remission  Non-Hodgkins lymphoma  S/P total abdominal hysterectomy    MEDS: acetaminophen   Tablet. 650 milliGRAM(s) PRN  allopurinol 300 milliGRAM(s)  ondansetron Injectable 4 milliGRAM(s) PRN   MEDICATIONS  (STANDING):  allopurinol 300 milliGRAM(s) Oral daily    ALLERGIES: penicillin (Unknown)    VS: T(F): 97, Max: 98.2 ( @ 20:00), HR: 64 (64 - 73), BP: 108/60 (84/37 - 128/55), RR: 18, SpO2: 99% (96% - 99%)  GEN: Alert, awake, NAD  ABD:    LABS/IMAGIN.1    12.94 )-----------( 19       ( 29 May 2018 06:50 )             23.5     WBC Count: 19.74 K/uL (18 @ 18:14)  WBC Count: 18.65 K/uL (18 @ 05:34)  WBC Count: 24.82 K/uL (18 @ 20:34)  WBC Count: 26.75 K/uL (18 @ 12:20)      131<L>  |  92<L>  |  34<H>  ----------------------------<  109<H>  3.6   |  31  |  1.5    Creatinine, Serum: 1.4 mg/dL (18 @ 20:34)  Creatinine, Serum: 1.2 mg/dL (18 @ 12:20)    PT/INR - ( 27 May 2018 12:20 )   PT: 19.20 sec;   INR: 1.76 ratio  PTT - ( 27 May 2018 12:20 )  PTT:29.8 sec    < from: CT Abdomen and Pelvis w/ IV Cont (18 @ 14:09) >  KIDNEYS: Normal renal enhancement bilaterally. 4 mm nonobstructing   calculus at the lower pole of the right kidney. There is a left   interpolar region cortical scar seen posteriorly. Mild right   hydroureteronephrosis. There is a 2 mm right distal ureteral calculus. A   second calculus measuring 3 mm is seen at the right ureterovesicular   junction. There is a 3 mm nonobstructing calculus at the lower pole left   kidney.  Multiple pelvic phleboliths seen.  PELVIC ORGANS: Status post hysterectomy. Urinary bladder is collapsed.   There is an 8 x 5 mm calculus within the bladder abutting the left   ureterovesicular junction. Other smaller hyperdense layering foci are   seen along the left posterior wall of the bladder (4:323). Mild ascites   seen in the pelvis.    Urinalysis Basic - ( 27 May 2018 12:20 )  Color: Yellow / Appearance: Cloudy / S.015 / pH: x  Gluc: x / Ketone: Negative  / Bili: Negative / Urobili: 1.0 mg/dL   Blood: x / Protein: 30 mg/dL / Nitrite: Negative   Leuk Esterase: Negative / RBC: x / WBC 1-2 /HPF   Sq Epi: x / Non Sq Epi: x / Bacteria: x    Culture - Urine (collected 27 May 2018 12:20)  Source: .Urine Clean Catch (Midstream)  Final Report (28 May 2018 21:59):    No growth 77y old Female presented to ER  with R flank pain, urinary urgency, frequency. Pt took Percocet x1 and all symptoms have since resolved. No dysuria, hematuria, fever/chills, nausea/vomiting. Pt w CML, on Vidaza (last 1 week ago), was on allopurinol about 1 mo. ago and continued here.       PAST MEDICAL & SURGICAL HISTORY:  Thrombocytopenia  Chronic myelomonocytic leukemia not having achieved remission  Non-Hodgkins lymphoma  S/P total abdominal hysterectomy    MEDS: acetaminophen   Tablet. 650 milliGRAM(s) PRN  allopurinol 300 milliGRAM(s)  ondansetron Injectable 4 milliGRAM(s) PRN   MEDICATIONS  (STANDING):  allopurinol 300 milliGRAM(s) Oral daily    ALLERGIES: penicillin (Unknown)    VS: T(F): 97, Max: 98.2 ( @ 20:00), HR: 64 (64 - 73), BP: 108/60 (84/37 - 128/55), RR: 18, SpO2: 99% (96% - 99%)  GEN: Alert, awake, NAD  ABD: soft, NT/ND, non palpable bladder, no suprapubic tenderness, no CVAT b/l    LABS/IMAGIN.1    12.94 )-----------( 19       ( 29 May 2018 06:50 )             23.5     WBC Count: 19.74 K/uL (18 @ 18:14)  WBC Count: 18.65 K/uL (18 @ 05:34)  WBC Count: 24.82 K/uL (18 @ 20:34)  WBC Count: 26.75 K/uL (18 @ 12:20)      131<L>  |  92<L>  |  34<H>  ----------------------------<  109<H>  3.6   |  31  |  1.5    Creatinine, Serum: 1.4 mg/dL (18 @ 20:34)  Creatinine, Serum: 1.2 mg/dL (18 @ 12:20)    PT/INR - ( 27 May 2018 12:20 )   PT: 19.20 sec;   INR: 1.76 ratio  PTT - ( 27 May 2018 12:20 )  PTT:29.8 sec    < from: CT Abdomen and Pelvis w/ IV Cont (18 @ 14:09) >  KIDNEYS: Normal renal enhancement bilaterally. 4 mm nonobstructing   calculus at the lower pole of the right kidney. There is a left   interpolar region cortical scar seen posteriorly. Mild right   hydroureteronephrosis. There is a 2 mm right distal ureteral calculus. A   second calculus measuring 3 mm is seen at the right ureterovesicular   junction. There is a 3 mm nonobstructing calculus at the lower pole left   kidney.  Multiple pelvic phleboliths seen.  PELVIC ORGANS: Status post hysterectomy. Urinary bladder is collapsed.   There is an 8 x 5 mm calculus within the bladder abutting the left   ureterovesicular junction. Other smaller hyperdense layering foci are   seen along the left posterior wall of the bladder (4:323). Mild ascites   seen in the pelvis.    Urinalysis Basic - ( 27 May 2018 12:20 )  Color: Yellow / Appearance: Cloudy / S.015 / pH: x  Gluc: x / Ketone: Negative  / Bili: Negative / Urobili: 1.0 mg/dL   Blood: x / Protein: 30 mg/dL / Nitrite: Negative   Leuk Esterase: Negative / RBC: x / WBC 1-2 /HPF   Sq Epi: x / Non Sq Epi: x / Bacteria: x    Culture - Urine (collected 27 May 2018 12:20)  Source: .Urine Clean Catch (Midstream)  Final Report (28 May 2018 21:59):    No growth

## 2018-05-30 LAB
ANION GAP SERPL CALC-SCNC: 9 MMOL/L — SIGNIFICANT CHANGE UP (ref 7–14)
APPEARANCE UR: (no result)
APPEARANCE UR: CLEAR — SIGNIFICANT CHANGE UP
BASOPHILS # BLD AUTO: 0.02 K/UL — SIGNIFICANT CHANGE UP (ref 0–0.2)
BASOPHILS NFR BLD AUTO: 0.3 % — SIGNIFICANT CHANGE UP (ref 0–1)
BILIRUB UR-MCNC: NEGATIVE — SIGNIFICANT CHANGE UP
BILIRUB UR-MCNC: NEGATIVE — SIGNIFICANT CHANGE UP
BUN SERPL-MCNC: 25 MG/DL — HIGH (ref 10–20)
CALCIUM SERPL-MCNC: 7.8 MG/DL — LOW (ref 8.5–10.1)
CHLORIDE SERPL-SCNC: 96 MMOL/L — LOW (ref 98–110)
CO2 SERPL-SCNC: 35 MMOL/L — HIGH (ref 17–32)
COLOR SPEC: YELLOW — SIGNIFICANT CHANGE UP
COLOR SPEC: YELLOW — SIGNIFICANT CHANGE UP
COMMENT - URINE: SIGNIFICANT CHANGE UP
CREAT SERPL-MCNC: 1.1 MG/DL — SIGNIFICANT CHANGE UP (ref 0.7–1.5)
DIFF PNL FLD: (no result)
DIFF PNL FLD: (no result)
EOSINOPHIL # BLD AUTO: 0.01 K/UL — SIGNIFICANT CHANGE UP (ref 0–0.7)
EOSINOPHIL NFR BLD AUTO: 0.1 % — SIGNIFICANT CHANGE UP (ref 0–8)
EPI CELLS # UR: (no result) /HPF
EPI CELLS # UR: (no result) /HPF
GLUCOSE SERPL-MCNC: 120 MG/DL — HIGH (ref 70–99)
GLUCOSE UR QL: NEGATIVE MG/DL — SIGNIFICANT CHANGE UP
GLUCOSE UR QL: NEGATIVE MG/DL — SIGNIFICANT CHANGE UP
HCT VFR BLD CALC: 20.7 % — LOW (ref 37–47)
HGB BLD-MCNC: 7 G/DL — CRITICAL LOW (ref 12–16)
IMM GRANULOCYTES NFR BLD AUTO: 18.1 % — HIGH (ref 0.1–0.3)
KETONES UR-MCNC: NEGATIVE — SIGNIFICANT CHANGE UP
KETONES UR-MCNC: NEGATIVE — SIGNIFICANT CHANGE UP
LEUKOCYTE ESTERASE UR-ACNC: (no result)
LEUKOCYTE ESTERASE UR-ACNC: NEGATIVE — SIGNIFICANT CHANGE UP
LYMPHOCYTES # BLD AUTO: 1.17 K/UL — LOW (ref 1.2–3.4)
LYMPHOCYTES # BLD AUTO: 15.2 % — LOW (ref 20.5–51.1)
MAGNESIUM SERPL-MCNC: 1.6 MG/DL — LOW (ref 1.8–2.4)
MCHC RBC-ENTMCNC: 29.2 PG — SIGNIFICANT CHANGE UP (ref 27–31)
MCHC RBC-ENTMCNC: 33.8 G/DL — SIGNIFICANT CHANGE UP (ref 32–37)
MCV RBC AUTO: 86.3 FL — SIGNIFICANT CHANGE UP (ref 81–99)
MONOCYTES # BLD AUTO: 1.82 K/UL — HIGH (ref 0.1–0.6)
MONOCYTES NFR BLD AUTO: 23.7 % — HIGH (ref 1.7–9.3)
NEUTROPHILS # BLD AUTO: 3.28 K/UL — SIGNIFICANT CHANGE UP (ref 1.4–6.5)
NEUTROPHILS NFR BLD AUTO: 42.6 % — SIGNIFICANT CHANGE UP (ref 42.2–75.2)
NITRITE UR-MCNC: NEGATIVE — SIGNIFICANT CHANGE UP
NITRITE UR-MCNC: NEGATIVE — SIGNIFICANT CHANGE UP
NRBC # BLD: 0 /100 WBCS — SIGNIFICANT CHANGE UP (ref 0–0)
PH UR: 7 — SIGNIFICANT CHANGE UP (ref 5–8)
PH UR: 7.5 — SIGNIFICANT CHANGE UP (ref 5–8)
PHOSPHATE SERPL-MCNC: 3.5 MG/DL — SIGNIFICANT CHANGE UP (ref 2.1–4.9)
PLATELET # BLD AUTO: 12 K/UL — CRITICAL LOW (ref 130–400)
POTASSIUM SERPL-MCNC: 3.3 MMOL/L — LOW (ref 3.5–5)
POTASSIUM SERPL-SCNC: 3.3 MMOL/L — LOW (ref 3.5–5)
PROT UR-MCNC: (no result) MG/DL
PROT UR-MCNC: 100 MG/DL
RBC # BLD: 2.4 M/UL — LOW (ref 4.2–5.4)
RBC # FLD: 20.4 % — HIGH (ref 11.5–14.5)
RBC CASTS # UR COMP ASSIST: (no result) /HPF
RBC CASTS # UR COMP ASSIST: >50 /HPF
SODIUM SERPL-SCNC: 140 MMOL/L — SIGNIFICANT CHANGE UP (ref 135–146)
SP GR SPEC: 1.01 — SIGNIFICANT CHANGE UP (ref 1.01–1.03)
SP GR SPEC: <=1.005 — SIGNIFICANT CHANGE UP (ref 1.01–1.03)
UROBILINOGEN FLD QL: 1 MG/DL (ref 0.2–0.2)
UROBILINOGEN FLD QL: 1 MG/DL (ref 0.2–0.2)
WBC # BLD: 7.69 K/UL — SIGNIFICANT CHANGE UP (ref 4.8–10.8)
WBC # FLD AUTO: 7.69 K/UL — SIGNIFICANT CHANGE UP (ref 4.8–10.8)

## 2018-05-30 PROCEDURE — 99222 1ST HOSP IP/OBS MODERATE 55: CPT

## 2018-05-30 RX ORDER — POTASSIUM CHLORIDE 20 MEQ
1 PACKET (EA) ORAL
Qty: 0 | Refills: 0 | COMMUNITY

## 2018-05-30 RX ORDER — POTASSIUM CHLORIDE 20 MEQ
20 PACKET (EA) ORAL ONCE
Qty: 0 | Refills: 0 | Status: COMPLETED | OUTPATIENT
Start: 2018-05-30 | End: 2018-05-30

## 2018-05-30 RX ORDER — POTASSIUM CITRATE MONOHYDRATE 100 %
10 POWDER (GRAM) MISCELLANEOUS
Qty: 300 | Refills: 0 | OUTPATIENT
Start: 2018-05-30

## 2018-05-30 RX ORDER — ERYTHROPOIETIN 10000 [IU]/ML
40000 INJECTION, SOLUTION INTRAVENOUS; SUBCUTANEOUS ONCE
Qty: 0 | Refills: 0 | Status: COMPLETED | OUTPATIENT
Start: 2018-05-30 | End: 2018-05-30

## 2018-05-30 RX ADMIN — ERYTHROPOIETIN 40000 UNIT(S): 10000 INJECTION, SOLUTION INTRAVENOUS; SUBCUTANEOUS at 18:28

## 2018-05-30 RX ADMIN — Medication 300 MILLIGRAM(S): at 12:53

## 2018-05-30 RX ADMIN — Medication 20 MILLIEQUIVALENT(S): at 12:53

## 2018-05-30 NOTE — CONSULT NOTE ADULT - SUBJECTIVE AND OBJECTIVE BOX
Patient is a 77y old  Female who presents with a chief complaint of Abdominal pain since the morning (27 May 2018 17:55)      HPI:  This is the case of a 76 YO woman presenting to the ED for the above CC.  History started with the acute onset of a sharp right sided flank pain, moderate in intensity, that radiated to the right lower quadrant with associated nausea but no vomiting. She also had associated urinary frequency but no dysuria or change in the color of urine.She denies any recent fevers or chills. The patient has a history of CMML for which she received her second cycle of Vidaza for 7 days that ended 5 days ago. The patient was noted to have anemia 2 days prior to presentation for which she received 2 units of pRBC. Following her first cycle of Vidaza the patient had worsening of her chronic thrombocytopenia and anemia for which she was admitted to the hospital.     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 650 milliGRAM(s) Oral every 6 hours PRN Mild Pain (1 - 3)  ondansetron Injectable 4 milliGRAM(s) IV Push two times a day PRN Nausea      Allergies    penicillin (Unknown)    Intolerances        Vital Signs Last 24 Hrs  T(C): 36.6 (30 May 2018 06:01), Max: 37.3 (29 May 2018 20:00)  T(F): 97.9 (30 May 2018 06:01), Max: 99.2 (29 May 2018 20:00)  HR: 67 (30 May 2018 06:01) (67 - 67)  BP: 114/52 (30 May 2018 06:01) (106/59 - 114/52)  BP(mean): --  RR: 18 (30 May 2018 06:01) (18 - 18)  SpO2: --    PHYSICAL EXAM  General: adult in NAD  HEENT: clear oropharynx, anicteric sclera, pink conjunctivae  Neck: supple  CV: normal S1S2 with no murmur rubs or gallops  Lungs: clear to auscultation, no wheezes, no rhales  Abdomen: soft non-tender non-distended, no hepato/splenomegaly  Ext: no clubbing cyanosis or edema  Skin: no rashes and no petichiae  Neuro: alert and oriented X3 no focal deficits      LABS:    CBC Full  -  ( 30 May 2018 05:59 )  WBC Count : 7.69 K/uL  Hemoglobin : 7.0 g/dL  Hematocrit : 20.7 %  Platelet Count - Automated : 12 K/uL  Mean Cell Volume : 86.3 fL  Mean Cell Hemoglobin : 29.2 pg  Mean Cell Hemoglobin Concentration : 33.8 g/dL  Auto Neutrophil # : 3.28 K/uL  Auto Lymphocyte # : 1.17 K/uL  Auto Monocyte # : 1.82 K/uL  Auto Eosinophil # : 0.01 K/uL  Auto Basophil # : 0.02 K/uL  Auto Neutrophil % : 42.6 %  Auto Lymphocyte % : 15.2 %  Auto Monocyte % : 23.7 %  Auto Eosinophil % : 0.1 %  Auto Basophil % : 0.3 %    05-30    140  |  96<L>  |  25<H>  ----------------------------<  120<H>  3.3<L>   |  35<H>  |  1.1    Ca    7.8<L>      30 May 2018 05:59  Phos  3.5     05-30  Mg     1.6     05-30          BLOOD SMEAR INTERPRETATION:    RADIOLOGY :  < from: US Urinary Bladder (05.29.18 @ 18:11) >  1 cm nonmobile renal calculus within the urinary bladder.    Elevated post void urinary residual approximately 49%.    < end of copied text >  < from: US Kidney and Bladder (05.29.18 @ 15:31) >  RIGHT KIDNEY: Normal in echogenicity, size measuring 11.6 cm in length.   No hydronephrosis. Nonobstructing 0.6 cm right renal lower pole calculus.   Vascular flow is demonstrated at the hilum.    LEFT KIDNEY: Normal in echogenicity, size measuring 13.6 cm in length. No   hydronephrosis. Few nonobstructing calculi, measuring up to 0.6 cm within   the left renal interpolar region. Vascular flow is demonstrated at the   hilum.     URINARY BLADDER: Urinary bladder is decompressed and therefore not   evaluated.     IMPRESSION:    1. Nonobstructing bilateral renal calculi.    2. Urinary bladder is decompressed and therefore not evaluated.    < end of copied text > Patient is a 77y old  Female who presents with a chief complaint of Abdominal pain since the morning (27 May 2018 17:55)      HPI:  This is the case of a 76 YO woman presenting to the ED for the above CC.  History started with the acute onset of a sharp right sided flank pain, moderate in intensity, that radiated to the right lower quadrant with associated nausea but no vomiting. She also had associated urinary frequency but no dysuria or change in the color of urine.She denies any recent fevers or chills. The patient has a history of CMML for which she received her second cycle of Vidaza for 7 days that ended 5 days ago. The patient was noted to have anemia 2 days prior to presentation for which she received 2 units of pRBC. Following her first cycle of Vidaza the patient had worsening of her chronic thrombocytopenia and anemia for which she was admitted to the hospital.     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 650 milliGRAM(s) Oral every 6 hours PRN Mild Pain (1 - 3)  ondansetron Injectable 4 milliGRAM(s) IV Push two times a day PRN Nausea      Allergies    penicillin (Unknown)    Intolerances        Vital Signs Last 24 Hrs  T(C): 36.6 (30 May 2018 06:01), Max: 37.3 (29 May 2018 20:00)  T(F): 97.9 (30 May 2018 06:01), Max: 99.2 (29 May 2018 20:00)  HR: 67 (30 May 2018 06:01) (67 - 67)  BP: 114/52 (30 May 2018 06:01) (106/59 - 114/52)  BP(mean): --  RR: 18 (30 May 2018 06:01) (18 - 18)  SpO2: --    PHYSICAL EXAM  General: adult in NAD  HEENT: pale  Neck: supple  CV: normal S1S2 with no murmur rubs or gallops  Lungs: clear to auscultation, no wheezes, no rhales  Abdomen: soft non-tender non-distended, splenomegaly  Ext: no clubbing cyanosis or edema  Skin: no rashes and no petichiae  Neuro: alert and oriented X3 no focal deficits      LABS:    CBC Full  -  ( 30 May 2018 05:59 )  WBC Count : 7.69 K/uL  Hemoglobin : 7.0 g/dL  Hematocrit : 20.7 %  Platelet Count - Automated : 12 K/uL  Mean Cell Volume : 86.3 fL  Mean Cell Hemoglobin : 29.2 pg  Mean Cell Hemoglobin Concentration : 33.8 g/dL  Auto Neutrophil # : 3.28 K/uL  Auto Lymphocyte # : 1.17 K/uL  Auto Monocyte # : 1.82 K/uL  Auto Eosinophil # : 0.01 K/uL  Auto Basophil # : 0.02 K/uL  Auto Neutrophil % : 42.6 %  Auto Lymphocyte % : 15.2 %  Auto Monocyte % : 23.7 %  Auto Eosinophil % : 0.1 %  Auto Basophil % : 0.3 %    05-30    140  |  96<L>  |  25<H>  ----------------------------<  120<H>  3.3<L>   |  35<H>  |  1.1    Ca    7.8<L>      30 May 2018 05:59  Phos  3.5     05-30  Mg     1.6     05-30          BLOOD SMEAR INTERPRETATION:    RADIOLOGY :  < from: US Urinary Bladder (05.29.18 @ 18:11) >  1 cm nonmobile renal calculus within the urinary bladder.    Elevated post void urinary residual approximately 49%.    < end of copied text >  < from: US Kidney and Bladder (05.29.18 @ 15:31) >  RIGHT KIDNEY: Normal in echogenicity, size measuring 11.6 cm in length.   No hydronephrosis. Nonobstructing 0.6 cm right renal lower pole calculus.   Vascular flow is demonstrated at the hilum.    LEFT KIDNEY: Normal in echogenicity, size measuring 13.6 cm in length. No   hydronephrosis. Few nonobstructing calculi, measuring up to 0.6 cm within   the left renal interpolar region. Vascular flow is demonstrated at the   hilum.     URINARY BLADDER: Urinary bladder is decompressed and therefore not   evaluated.     IMPRESSION:    1. Nonobstructing bilateral renal calculi.    2. Urinary bladder is decompressed and therefore not evaluated.    < end of copied text >

## 2018-05-30 NOTE — PROGRESS NOTE ADULT - ATTENDING COMMENTS
patient refusing intervention  reconsult once patient agrees to treatment
patient seen and examined independently
patient seen and examined independently

## 2018-05-30 NOTE — PROGRESS NOTE ADULT - SUBJECTIVE AND OBJECTIVE BOX
Progress Note    Subjective 76 y/o f with B/L nephrolithiasis, left hydronephrosis 2/2 2 ureteral stones.    Objective pt doing well no acute issues at this time. Pt is refusing intervention at this time    Vital signs  T(C): , Max: 37.3 (05-29-18 @ 20:00)  HR: 67 (05-30-18 @ 06:01)  BP: 114/52 (05-30-18 @ 06:01)    Gen in NAD    Abd no CVAT, soft non tender     voiding without issue    Labs                        7.0    7.69  )-----------( 12       ( 30 May 2018 05:59 )             20.7     30 May 2018 05:59    140    |  96     |  25     ----------------------------<  120    3.3     |  35     |  1.1      Ca    7.8        30 May 2018 05:59  Phos  3.5       30 May 2018 05:59  Mg     1.6       30 May 2018 05:59    Imaging    US Urinary Bladder (05.29.18 @ 18:11) >  EXAM:  US URINARY BLADDER            PROCEDURE DATE:  05/29/2018          INTERPRETATION:  CLINICAL HISTORY / REASON FOR EXAM: Evaluation for   possible ureterovesicular junction versus intraluminal bladder stone    CORRELATION: CT abdomen and pelvis from May 27, 2018    TECHNIQUE: Urinary bladder ultrasound was performed.    FINDINGS:    URINARY BLADDER: Prevoid volume of approximately 930 mL.  Urinary bladder   wall mildly thickened at 5 mm. A 1 cm nonmobile, hyperechoic structure   with posterior shadowing seen in the posterior bladder, correlating with   recent CT. Bilateral ureteral jets are visualized. Postvoid volume is   approximately 452 mL.    IMPRESSION:    1 cm nonmobile renal calculus within the urinary bladder.    Elevated post void urinary residual approximately 49%.

## 2018-05-30 NOTE — PROGRESS NOTE ADULT - ASSESSMENT
76 y/o f with B/L nephrolithiasis, left hydronephrosis 2/2 2 ureteral stones.  Stones c/w uric acid stones.  She is refusing intervention at this time, she wishes to continue with alkalization.    A) incomplete bladder emptying  B/L nephrolithiasis  left ureteral stones    P) d/w Dr. Hernandez  Pt to follow up as op  continue alkalization keep urine ph between 6-7  no acute intervention at this time.

## 2018-05-30 NOTE — PROGRESS NOTE ADULT - ASSESSMENT
76 YO woman with a Hx of CMML on Vidaza, last cycle 1 week ago presenting with right flank pain due to kidney stones. The pain is likely due to the larger stone (5x8mm) that already passed into the bladder.  The patient was also found to have severe thrombocytopenia and hypokalemia.    # nephrolithiasis , large stone mostly likely passed to bladder  - c/w allopurinol  - U/A q8h  - urology consulted for stone, as per urology UA q8h, bicarb drip  -US kidney/bladder shows bilateral kidney stones, stone in the bladder      # Normocytic anemia, anemia likely 2/2 chemotherapy  - stable  - will monitor cbc.     # Hypokalemia, Most likely due to Vidaza,  - resolved, will replete accordingly    # Hypomagnesemia, hypomagnesemia last night and repleted  - will follow up  - resolved    # Thrombocytopenia, Chronic thrombocytopenia that was worsened by recent chemotherapy  - chronic, heme/onc consulted  - stable, will monitor    # Chronic myelomonocytic leukemia not having achieved remission  - continue with allopurinol for now  - follow up with Dr. Jackson and heme/onc pending    Disoo: home with outpatient follow up. 76 YO woman with a Hx of CMML on Vidaza, last cycle 1 week ago presenting with right flank pain due to kidney stones. The pain is likely due to the larger stone (5x8mm) that already passed into the bladder.  The patient was also found to have severe thrombocytopenia and hypokalemia.    # nephrolithiasis uric acid , large stone mostly likely passed to bladder  - c/w allopurinol  -no intervention as inpt per urology   -potassium citrate 10 qday to keep urine ph6-7      # Normocytic anemia, anemia likely 2/2 chemotherapy  - stable  - will monitor cbc.     # Hypokalemia, Most likely due to Vidaza,  - resolved, will replete accordingly    # Hypomagnesemia,   replete     # Thrombocytopenia, down to 12 today. no bleeding     # Chronic myelomonocytic leukemia not having achieved remission  - continue with allopurinol for now  - follow up with Dr. Jackson and heme/onc pending    Dispo: home with outpatient follow up if cleared by heme/onc     spent 33 minutes on discharge

## 2018-05-30 NOTE — PROGRESS NOTE ADULT - SUBJECTIVE AND OBJECTIVE BOX
SUBJECTIVE:    Patient is a 77y old Female who presents with a chief complaint of Abdominal pain since the morning (27 May 2018 17:55)    Currently admitted to medicine with the primary diagnosis of Kidney stone     Today is hospital day 3d. This morning she is resting comfortably in bed and reports no new issues or overnight events.     PAST MEDICAL & SURGICAL HISTORY  Thrombocytopenia  Chronic myelomonocytic leukemia not having achieved remission  Non-Hodgkins lymphoma  S/P total abdominal hysterectomy    SOCIAL HISTORY:  Negative for smoking/alcohol/drug use.     ALLERGIES:  penicillin (Unknown)    MEDICATIONS:  STANDING MEDICATIONS  allopurinol 300 milliGRAM(s) Oral daily  dextrose 5% + sodium chloride 0.45% 1000 milliLiter(s) IV Continuous <Continuous>    PRN MEDICATIONS  acetaminophen   Tablet. 650 milliGRAM(s) Oral every 6 hours PRN  ondansetron Injectable 4 milliGRAM(s) IV Push two times a day PRN    VITALS:   T(F): 97.9  HR: 67  BP: 114/52  RR: 18  SpO2: --    LABS:                        7.0    7.69  )-----------( 12       ( 30 May 2018 05:59 )             20.7     05-30    140  |  96<L>  |  25<H>  ----------------------------<  120<H>  3.3<L>   |  35<H>  |  1.1    Ca    7.8<L>      30 May 2018 05:59  Phos  3.5     05-30  Mg     1.6     05-30      Culture - Urine (collected 27 May 2018 12:20)  Source: .Urine Clean Catch (Midstream)  Final Report (28 May 2018 21:59):    No growth    RADIOLOGY:    PHYSICAL EXAM:  GEN: No acute distress  LUNGS: Clear to auscultation bilaterally   HEART: S1/S2 present. RRR.   ABD: Soft, non-tender, non-distended. Bowel sounds present  NEURO: AAOX3 SUBJECTIVE:    Patient is a 77y old Female who presents with a chief complaint of Abdominal pain since the morning (27 May 2018 17:55)    Currently admitted to medicine with the primary diagnosis of Kidney stone     Today is hospital day 3d. This morning she is resting comfortably in bed and reports no new issues or overnight events.     PAST MEDICAL & SURGICAL HISTORY  Thrombocytopenia  Chronic myelomonocytic leukemia not having achieved remission  Non-Hodgkins lymphoma  S/P total abdominal hysterectomy    SOCIAL HISTORY:  Negative for smoking/alcohol/drug use.     ALLERGIES:  penicillin (Unknown)    MEDICATIONS:  STANDING MEDICATIONS  allopurinol 300 milliGRAM(s) Oral daily  dextrose 5% + sodium chloride 0.45% 1000 milliLiter(s) IV Continuous <Continuous>    PRN MEDICATIONS  acetaminophen   Tablet. 650 milliGRAM(s) Oral every 6 hours PRN  ondansetron Injectable 4 milliGRAM(s) IV Push two times a day PRN    VITALS:   T(F): 97.9  HR: 67  BP: 114/52  RR: 18  SpO2: --    LABS:                        7.0    7.69  )-----------( 12       ( 30 May 2018 05:59 )             20.7     05-30    140  |  96<L>  |  25<H>  ----------------------------<  120<H>  3.3<L>   |  35<H>  |  1.1    Ca    7.8<L>      30 May 2018 05:59  Phos  3.5     05-30  Mg     1.6     05-30      Culture - Urine (collected 27 May 2018 12:20)  Source: .Urine Clean Catch (Midstream)  Final Report (28 May 2018 21:59):    No growth    RADIOLOGY:    PHYSICAL EXAM:  GEN: No acute distress  Pulm: Clear to auscultation bilaterally   CV: S1/S2 present. RRR.   GI: Soft, non-tender, non-distended. Bowel sounds present  NEURO: AAOX3  skin : no rash

## 2018-05-30 NOTE — CONSULT NOTE ADULT - ASSESSMENT
76 YO woman with a Hx of CMML s/p 2 cycles of  Vidaza,last cycle 1 week ago presenting with right flank pain due to kidney stones.     # nephrolithiasis uric acid , large stone mostly likely passed to bladder  - c/w allopurinol  -no intervention as inpt per urology       # Normocytic anemia, anemia likely 2/2 chemotherapy  - stable  - will monitor cbc and transfuse if hb <7  c/w procrit       # Thrombocytopenia, down to 12 today.  monitor platelets and transfuse as needed if <10 and <50 if bleeding     # Chronic myelomonocytic leukemia   - continue with allopurinol for now  -outpatient f/u for further therapy 76 YO woman with a Hx of CMML s/p 2 cycles of  Vidaza,last cycle 1 week ago presenting with right flank pain due to kidney stones.     # nephrolithiasis uric acid , large stone mostly likely passed to bladder  - c/w allopurinol  -no intervention as inpt per urology       # Normocytic anemia, anemia likely 2/2 chemotherapy, s/p transfusion 5 days ago , no evidence of bleeding .   -c/w procrit     # Thrombocytopenia, down to 12 today.     # Chronic myelomonocytic leukemia   - continue with allopurinol for now  Would transfuse one unit platelet and rbc , discharge to follow up on Monday .   -outpatient f/u for further therapy

## 2018-05-31 ENCOUNTER — APPOINTMENT (OUTPATIENT)
Dept: HEMATOLOGY ONCOLOGY | Facility: CLINIC | Age: 78
End: 2018-05-31

## 2018-05-31 ENCOUNTER — TRANSCRIPTION ENCOUNTER (OUTPATIENT)
Age: 78
End: 2018-05-31

## 2018-05-31 VITALS
DIASTOLIC BLOOD PRESSURE: 56 MMHG | TEMPERATURE: 96 F | SYSTOLIC BLOOD PRESSURE: 125 MMHG | HEART RATE: 82 BPM | RESPIRATION RATE: 20 BRPM

## 2018-05-31 LAB
ANION GAP SERPL CALC-SCNC: 9 MMOL/L — SIGNIFICANT CHANGE UP (ref 7–14)
ANISOCYTOSIS BLD QL: SIGNIFICANT CHANGE UP
BASO STIPL BLD QL SMEAR: PRESENT — SIGNIFICANT CHANGE UP
BASOPHILS # BLD AUTO: 0 K/UL — SIGNIFICANT CHANGE UP (ref 0–0.2)
BASOPHILS # BLD AUTO: 0.02 K/UL — SIGNIFICANT CHANGE UP (ref 0–0.2)
BASOPHILS NFR BLD AUTO: 0 % — SIGNIFICANT CHANGE UP (ref 0–1)
BASOPHILS NFR BLD AUTO: 0.3 % — SIGNIFICANT CHANGE UP (ref 0–1)
BLASTS # FLD: 0.9 % — HIGH (ref 0–0)
BLD GP AB SCN SERPL QL: SIGNIFICANT CHANGE UP
BUN SERPL-MCNC: 23 MG/DL — HIGH (ref 10–20)
CALCIUM SERPL-MCNC: 7.7 MG/DL — LOW (ref 8.5–10.1)
CHLORIDE SERPL-SCNC: 100 MMOL/L — SIGNIFICANT CHANGE UP (ref 98–110)
CO2 SERPL-SCNC: 32 MMOL/L — SIGNIFICANT CHANGE UP (ref 17–32)
CREAT SERPL-MCNC: 0.9 MG/DL — SIGNIFICANT CHANGE UP (ref 0.7–1.5)
EOSINOPHIL # BLD AUTO: 0 K/UL — SIGNIFICANT CHANGE UP (ref 0–0.7)
EOSINOPHIL # BLD AUTO: 0 K/UL — SIGNIFICANT CHANGE UP (ref 0–0.7)
EOSINOPHIL NFR BLD AUTO: 0 % — SIGNIFICANT CHANGE UP (ref 0–8)
EOSINOPHIL NFR BLD AUTO: 0 % — SIGNIFICANT CHANGE UP (ref 0–8)
GIANT PLATELETS BLD QL SMEAR: PRESENT — SIGNIFICANT CHANGE UP
GLUCOSE SERPL-MCNC: 104 MG/DL — HIGH (ref 70–99)
HCT VFR BLD CALC: 22.3 % — LOW (ref 37–47)
HCT VFR BLD CALC: 24.7 % — LOW (ref 37–47)
HGB BLD-MCNC: 7.6 G/DL — LOW (ref 12–16)
HGB BLD-MCNC: 8.4 G/DL — LOW (ref 12–16)
HYPOCHROMIA BLD QL: SLIGHT — SIGNIFICANT CHANGE UP
IMM GRANULOCYTES NFR BLD AUTO: 19.7 % — HIGH (ref 0.1–0.3)
LYMPHOCYTES # BLD AUTO: 1.03 K/UL — LOW (ref 1.2–3.4)
LYMPHOCYTES # BLD AUTO: 1.23 K/UL — SIGNIFICANT CHANGE UP (ref 1.2–3.4)
LYMPHOCYTES # BLD AUTO: 14.8 % — LOW (ref 20.5–51.1)
LYMPHOCYTES # BLD AUTO: 16.5 % — LOW (ref 20.5–51.1)
MACROCYTES BLD QL: SLIGHT — SIGNIFICANT CHANGE UP
MAGNESIUM SERPL-MCNC: 1.2 MG/DL — LOW (ref 1.8–2.4)
MANUAL SMEAR VERIFICATION: SIGNIFICANT CHANGE UP
MCHC RBC-ENTMCNC: 29.1 PG — SIGNIFICANT CHANGE UP (ref 27–31)
MCHC RBC-ENTMCNC: 29.5 PG — SIGNIFICANT CHANGE UP (ref 27–31)
MCHC RBC-ENTMCNC: 34 G/DL — SIGNIFICANT CHANGE UP (ref 32–37)
MCHC RBC-ENTMCNC: 34.1 G/DL — SIGNIFICANT CHANGE UP (ref 32–37)
MCV RBC AUTO: 85.5 FL — SIGNIFICANT CHANGE UP (ref 81–99)
MCV RBC AUTO: 86.4 FL — SIGNIFICANT CHANGE UP (ref 81–99)
METAMYELOCYTES # FLD: 3.5 % — HIGH (ref 0–0)
MONOCYTES # BLD AUTO: 1.42 K/UL — HIGH (ref 0.1–0.6)
MONOCYTES # BLD AUTO: 1.58 K/UL — HIGH (ref 0.1–0.6)
MONOCYTES NFR BLD AUTO: 19.1 % — HIGH (ref 1.7–9.3)
MONOCYTES NFR BLD AUTO: 22.8 % — HIGH (ref 1.7–9.3)
MYELOCYTES NFR BLD: 5.2 % — HIGH (ref 0–0)
NEUTROPHILS # BLD AUTO: 2.53 K/UL — SIGNIFICANT CHANGE UP (ref 1.4–6.5)
NEUTROPHILS # BLD AUTO: 4.61 K/UL — SIGNIFICANT CHANGE UP (ref 1.4–6.5)
NEUTROPHILS NFR BLD AUTO: 40.7 % — LOW (ref 42.2–75.2)
NEUTROPHILS NFR BLD AUTO: 54.8 % — SIGNIFICANT CHANGE UP (ref 42.2–75.2)
NEUTS BAND # BLD: 0.8 % — SIGNIFICANT CHANGE UP (ref 0–6)
NRBC # BLD: 0 /100 WBCS — SIGNIFICANT CHANGE UP (ref 0–0)
NRBC # BLD: 0 /100 WBCS — SIGNIFICANT CHANGE UP (ref 0–0)
PHOSPHATE SERPL-MCNC: 3.5 MG/DL — SIGNIFICANT CHANGE UP (ref 2.1–4.9)
PLAT MORPH BLD: NORMAL — SIGNIFICANT CHANGE UP
PLATELET # BLD AUTO: 18 K/UL — CRITICAL LOW (ref 130–400)
PLATELET # BLD AUTO: 25 K/UL — LOW (ref 130–400)
POIKILOCYTOSIS BLD QL AUTO: SLIGHT — SIGNIFICANT CHANGE UP
POLYCHROMASIA BLD QL SMEAR: SIGNIFICANT CHANGE UP
POTASSIUM SERPL-MCNC: 3.2 MMOL/L — LOW (ref 3.5–5)
POTASSIUM SERPL-SCNC: 3.2 MMOL/L — LOW (ref 3.5–5)
RBC # BLD: 2.58 M/UL — LOW (ref 4.2–5.4)
RBC # BLD: 2.89 M/UL — LOW (ref 4.2–5.4)
RBC # FLD: 21 % — HIGH (ref 11.5–14.5)
RBC # FLD: 21.3 % — HIGH (ref 11.5–14.5)
RBC BLD AUTO: NORMAL — SIGNIFICANT CHANGE UP
SODIUM SERPL-SCNC: 141 MMOL/L — SIGNIFICANT CHANGE UP (ref 135–146)
TYPE + AB SCN PNL BLD: SIGNIFICANT CHANGE UP
VARIANT LYMPHS # BLD: 0.9 % — SIGNIFICANT CHANGE UP (ref 0–5)
WBC # BLD: 6.23 K/UL — SIGNIFICANT CHANGE UP (ref 4.8–10.8)
WBC # BLD: 8.29 K/UL — SIGNIFICANT CHANGE UP (ref 4.8–10.8)
WBC # FLD AUTO: 6.23 K/UL — SIGNIFICANT CHANGE UP (ref 4.8–10.8)
WBC # FLD AUTO: 8.29 K/UL — SIGNIFICANT CHANGE UP (ref 4.8–10.8)

## 2018-05-31 RX ORDER — MAGNESIUM OXIDE 400 MG ORAL TABLET 241.3 MG
500 TABLET ORAL
Qty: 0 | Refills: 0 | Status: DISCONTINUED | OUTPATIENT
Start: 2018-05-31 | End: 2018-05-31

## 2018-05-31 RX ORDER — MAGNESIUM OXIDE 400 MG ORAL TABLET 241.3 MG
400 TABLET ORAL
Qty: 0 | Refills: 0 | Status: DISCONTINUED | OUTPATIENT
Start: 2018-05-31 | End: 2018-05-31

## 2018-05-31 RX ORDER — ALLOPURINOL 300 MG
1 TABLET ORAL
Qty: 30 | Refills: 0 | OUTPATIENT
Start: 2018-05-31

## 2018-05-31 RX ORDER — POTASSIUM CHLORIDE 20 MEQ
40 PACKET (EA) ORAL EVERY 4 HOURS
Qty: 0 | Refills: 0 | Status: DISCONTINUED | OUTPATIENT
Start: 2018-05-31 | End: 2018-05-31

## 2018-05-31 RX ORDER — MAGNESIUM OXIDE 400 MG ORAL TABLET 241.3 MG
2 TABLET ORAL
Qty: 10 | Refills: 0 | OUTPATIENT
Start: 2018-05-31

## 2018-05-31 RX ORDER — MAGNESIUM SULFATE 500 MG/ML
2 VIAL (ML) INJECTION
Qty: 0 | Refills: 0 | Status: DISCONTINUED | OUTPATIENT
Start: 2018-05-31 | End: 2018-05-31

## 2018-05-31 RX ORDER — ALLOPURINOL 300 MG
1 TABLET ORAL
Qty: 0 | Refills: 0 | COMMUNITY

## 2018-05-31 RX ADMIN — Medication 50 GRAM(S): at 10:41

## 2018-05-31 RX ADMIN — MAGNESIUM OXIDE 400 MG ORAL TABLET 400 MILLIGRAM(S): 241.3 TABLET ORAL at 10:41

## 2018-05-31 RX ADMIN — Medication 40 MILLIEQUIVALENT(S): at 10:41

## 2018-05-31 NOTE — DISCHARGE NOTE ADULT - PATIENT PORTAL LINK FT
You can access the [facility] Patient Portal, offered by [facility], by registering with the following website: [email or web address]

## 2018-05-31 NOTE — PROGRESS NOTE ADULT - ASSESSMENT
# nephrolithiasis uric acid , large stone mostly likely passed to bladder  - c/w allopurinol  -no intervention as inpt per urology   -potassium citrate 10 qday to keep urine ph6-7      # Normocytic anemia, anemia likely 2/2 chemotherapy  s/p procrit   s/p one unit of PRBC yesterday     # Hypokalemia, Most likely due to Vidaza,  -replete     # Hypomagnesemia,   replete     # Thrombocytopenia, s/p one unit 18 today     # Chronic myelomonocytic leukemia  - continue with allopurinol for now      Dispo: home with outpatient follow up with urology and heme/onc    spent 33 minutes on discharge

## 2018-05-31 NOTE — DISCHARGE NOTE ADULT - HOSPITAL COURSE
78 YO woman presenting to the ED for the above CC.  History started with the acute onset of a sharp right sided flank pain, moderate in intensity, that radiated to the right lower quadrant with associated nausea but no vomiting. She also had associated urinary frequency but no dysuria or change in the color of urine.  She denies any recent fevers or chills.   The patient has a history of CMML for which she received her second cycle of Vidaza for 7 days that ended 5 days ago. The patient was noted to have anemia 2 days prior to presentation for which she received 2 units of pRBC. Following her first cycle of Vidaza the patient had worsening of her chronic thrombocytopenia and anemia for which she was admitted to the hospital.    Patient was evaluated by Urology in hospital, had a us kidney/bladder done which showed bilateral non obstructing renal calculi and non obstructing stone in bladder.  No surgical intervention was wanted by the patient at this time.  follow up with urology in 2 weeks was recommended.  Also patient was evaluated by heme/onc, electrolytes repleted and patient sent home on supplementation for potassium and magnesium.  Patient recommended to follow up with heme/onc in 2 weeks.

## 2018-05-31 NOTE — DISCHARGE NOTE ADULT - MEDICATION SUMMARY - MEDICATIONS TO TAKE
I will START or STAY ON the medications listed below when I get home from the hospital:    Zofran 4 mg oral tablet  -- 1 tab(s) by mouth every 8 hours  -- Indication: For Nausea     allopurinol 300 mg oral tablet  -- 1 tab(s) by mouth once a day  -- Indication: For Uric acid     magnesium oxide 400 mg (241.3 mg elemental magnesium) oral tablet  -- 2  by mouth once a day   -- Indication: For supplementation    potassium citrate compounding powder  -- 10 milliliter(s) compounding once a day   -- Indication: For supplementation I will START or STAY ON the medications listed below when I get home from the hospital:    Zofran 4 mg oral tablet  -- 1 tab(s) by mouth every 8 hours  -- Indication: For Nausea     allopurinol 300 mg oral tablet  -- 1 tab(s) by mouth once a day  -- Indication: For Uric acid    magnesium oxide 400 mg (241.3 mg elemental magnesium) oral tablet  -- 2  by mouth once a day   -- Indication: For supplementation    potassium citrate compounding powder  -- 10 milliliter(s) compounding once a day   -- Indication: For supplementation

## 2018-05-31 NOTE — DISCHARGE NOTE ADULT - PLAN OF CARE
Prevent future stones and resolve current stones. Take medications as prescribed and follow up with Urology in 2 weeks. Prevent disease worsening. Take medication as prescribed and follow up with heme/onc in 2 weeks.

## 2018-05-31 NOTE — DISCHARGE NOTE ADULT - CARE PLAN
Principal Discharge DX:	Kidney stone  Goal:	Prevent future stones and resolve current stones.  Assessment and plan of treatment:	Take medications as prescribed and follow up with Urology in 2 weeks.  Secondary Diagnosis:	Chronic myelomonocytic leukemia not having achieved remission  Goal:	Prevent disease worsening.  Assessment and plan of treatment:	Take medication as prescribed and follow up with heme/onc in 2 weeks.

## 2018-05-31 NOTE — DISCHARGE NOTE ADULT - CARE PROVIDERS DIRECT ADDRESSES
[email or web address]

## 2018-05-31 NOTE — DISCHARGE NOTE ADULT - CARE PROVIDER_API CALL
Mitch Jackson), Internal Medicine; Medical Oncology  [address] [place] [address]  Phone: [phone]  Fax: [phone]    Kiara Hernandez), Urology  [address] [place] [address]  Phone: [phone]  Fax: [phone]

## 2018-05-31 NOTE — PROGRESS NOTE ADULT - SUBJECTIVE AND OBJECTIVE BOX
no chest pain and no sob     Vital Signs Last 24 Hrs  T(C): 35.8 (31 May 2018 05:42), Max: 37.3 (30 May 2018 13:28)  T(F): 96.4 (31 May 2018 05:42), Max: 99.1 (30 May 2018 13:28)  HR: 82 (31 May 2018 05:42) (69 - 82)  BP: 125/56 (31 May 2018 05:42) (105/51 - 130/61)  BP(mean): --  RR: 20 (31 May 2018 05:42) (18 - 20)  SpO2: --    PHYSICAL EXAM:  GENERAL: NAD, well-developed  HEAD:  Atraumatic, Normocephalic  EYES: EOMI, PERRLA, conjunctiva and sclera clear  NECK: Supple, No JVD  CHEST/LUNG: Clear to auscultation bilaterally; No wheeze  HEART: Regular rate and rhythm; No murmurs, rubs, or gallops  ABDOMEN: Soft, Nontender, Nondistended; Bowel sounds present  EXTREMITIES:  2+ Peripheral Pulses, No clubbing, cyanosis, or edema  PSYCH: AAOx3  NEUROLOGY: non-focal  SKIN: No rashes or lesions                          7.6    6.23  )-----------( 18       ( 31 May 2018 07:02 )             22.3     05-31    141  |  100  |  23<H>  ----------------------------<  104<H>  3.2<L>   |  32  |  0.9    Ca    7.7<L>      31 May 2018 07:02  Phos  3.5     05-31  Mg     1.2     05-31

## 2018-06-04 ENCOUNTER — LABORATORY RESULT (OUTPATIENT)
Age: 78
End: 2018-06-04

## 2018-06-04 ENCOUNTER — APPOINTMENT (OUTPATIENT)
Dept: INFUSION THERAPY | Facility: CLINIC | Age: 78
End: 2018-06-04

## 2018-06-04 ENCOUNTER — APPOINTMENT (OUTPATIENT)
Dept: HEMATOLOGY ONCOLOGY | Facility: CLINIC | Age: 78
End: 2018-06-04

## 2018-06-04 RX ORDER — ERYTHROPOIETIN 10000 [IU]/ML
40000 INJECTION, SOLUTION INTRAVENOUS; SUBCUTANEOUS ONCE
Qty: 0 | Refills: 0 | Status: COMPLETED | OUTPATIENT
Start: 2018-06-04 | End: 2018-06-04

## 2018-06-04 RX ADMIN — ERYTHROPOIETIN 40000 UNIT(S): 10000 INJECTION, SOLUTION INTRAVENOUS; SUBCUTANEOUS at 13:18

## 2018-06-05 LAB
HCT VFR BLD CALC: 27.1 %
HGB BLD-MCNC: 9 G/DL
MCHC RBC-ENTMCNC: 31.3 PG
MCHC RBC-ENTMCNC: 33.2 G/DL
MCV RBC AUTO: 94.1 FL
PLATELET # BLD AUTO: 26 K/UL
PMV BLD: 13 FL
RBC # BLD: 2.88 M/UL
RBC # FLD: 25.5 %
WBC # FLD AUTO: 7.71 K/UL

## 2018-06-06 DIAGNOSIS — T45.1X5A ADVERSE EFFECT OF ANTINEOPLASTIC AND IMMUNOSUPPRESSIVE DRUGS, INITIAL ENCOUNTER: ICD-10-CM

## 2018-06-06 DIAGNOSIS — D64.81 ANEMIA DUE TO ANTINEOPLASTIC CHEMOTHERAPY: ICD-10-CM

## 2018-06-06 DIAGNOSIS — C93.10 CHRONIC MYELOMONOCYTIC LEUKEMIA NOT HAVING ACHIEVED REMISSION: ICD-10-CM

## 2018-06-06 DIAGNOSIS — E83.42 HYPOMAGNESEMIA: ICD-10-CM

## 2018-06-06 DIAGNOSIS — E87.6 HYPOKALEMIA: ICD-10-CM

## 2018-06-06 DIAGNOSIS — Z88.0 ALLERGY STATUS TO PENICILLIN: ICD-10-CM

## 2018-06-06 DIAGNOSIS — C85.90 NON-HODGKIN LYMPHOMA, UNSPECIFIED, UNSPECIFIED SITE: ICD-10-CM

## 2018-06-06 DIAGNOSIS — N13.2 HYDRONEPHROSIS WITH RENAL AND URETERAL CALCULOUS OBSTRUCTION: ICD-10-CM

## 2018-06-06 DIAGNOSIS — N20.0 CALCULUS OF KIDNEY: ICD-10-CM

## 2018-06-06 DIAGNOSIS — Z90.710 ACQUIRED ABSENCE OF BOTH CERVIX AND UTERUS: ICD-10-CM

## 2018-06-06 DIAGNOSIS — E79.0 HYPERURICEMIA WITHOUT SIGNS OF INFLAMMATORY ARTHRITIS AND TOPHACEOUS DISEASE: ICD-10-CM

## 2018-06-06 DIAGNOSIS — D69.6 THROMBOCYTOPENIA, UNSPECIFIED: ICD-10-CM

## 2018-06-07 ENCOUNTER — RX RENEWAL (OUTPATIENT)
Age: 78
End: 2018-06-07

## 2018-06-07 ENCOUNTER — LABORATORY RESULT (OUTPATIENT)
Age: 78
End: 2018-06-07

## 2018-06-07 ENCOUNTER — APPOINTMENT (OUTPATIENT)
Dept: HEMATOLOGY ONCOLOGY | Facility: CLINIC | Age: 78
End: 2018-06-07

## 2018-06-08 LAB
HCT VFR BLD CALC: 27.2 %
HGB BLD-MCNC: 9 G/DL
MCHC RBC-ENTMCNC: 30.9 PG
MCHC RBC-ENTMCNC: 33.1 G/DL
MCV RBC AUTO: 93.5 FL
PLATELET # BLD AUTO: 55 K/UL
PMV BLD: 13.1 FL
RBC # BLD: 2.91 M/UL
RBC # FLD: 26 %
WBC # FLD AUTO: 4.45 K/UL

## 2018-06-11 ENCOUNTER — APPOINTMENT (OUTPATIENT)
Dept: HEMATOLOGY ONCOLOGY | Facility: CLINIC | Age: 78
End: 2018-06-11

## 2018-06-11 ENCOUNTER — LABORATORY RESULT (OUTPATIENT)
Age: 78
End: 2018-06-11

## 2018-06-11 ENCOUNTER — APPOINTMENT (OUTPATIENT)
Dept: INFUSION THERAPY | Facility: CLINIC | Age: 78
End: 2018-06-11

## 2018-06-11 VITALS
BODY MASS INDEX: 24.66 KG/M2 | DIASTOLIC BLOOD PRESSURE: 53 MMHG | HEIGHT: 62 IN | RESPIRATION RATE: 16 BRPM | WEIGHT: 134 LBS | HEART RATE: 74 BPM | TEMPERATURE: 98.2 F | SYSTOLIC BLOOD PRESSURE: 112 MMHG

## 2018-06-11 RX ORDER — ERYTHROPOIETIN 10000 [IU]/ML
40000 INJECTION, SOLUTION INTRAVENOUS; SUBCUTANEOUS ONCE
Qty: 0 | Refills: 0 | Status: COMPLETED | OUTPATIENT
Start: 2018-06-11 | End: 2018-06-11

## 2018-06-11 RX ORDER — AZACITIDINE FOR 100 MG/1
125 INJECTION, POWDER, LYOPHILIZED, FOR SOLUTION INTRAVENOUS; SUBCUTANEOUS ONCE
Qty: 0 | Refills: 0 | Status: COMPLETED | OUTPATIENT
Start: 2018-06-11 | End: 2018-06-11

## 2018-06-11 RX ORDER — ONDANSETRON 8 MG/1
8 TABLET, FILM COATED ORAL ONCE
Qty: 0 | Refills: 0 | Status: COMPLETED | OUTPATIENT
Start: 2018-06-11 | End: 2018-06-11

## 2018-06-11 RX ADMIN — ONDANSETRON 8 MILLIGRAM(S): 8 TABLET, FILM COATED ORAL at 13:19

## 2018-06-11 RX ADMIN — ERYTHROPOIETIN 40000 UNIT(S): 10000 INJECTION, SOLUTION INTRAVENOUS; SUBCUTANEOUS at 13:19

## 2018-06-11 RX ADMIN — AZACITIDINE FOR 125 MILLIGRAM(S): 100 INJECTION, POWDER, LYOPHILIZED, FOR SOLUTION INTRAVENOUS; SUBCUTANEOUS at 13:21

## 2018-06-12 ENCOUNTER — APPOINTMENT (OUTPATIENT)
Dept: INFUSION THERAPY | Facility: CLINIC | Age: 78
End: 2018-06-12

## 2018-06-12 LAB
HCT VFR BLD CALC: 27.3 %
HGB BLD-MCNC: 9.3 G/DL
MCHC RBC-ENTMCNC: 31 PG
MCHC RBC-ENTMCNC: 34.1 G/DL
MCV RBC AUTO: 91 FL
PLATELET # BLD AUTO: 31 K/UL
PMV BLD: NORMAL
RBC # BLD: 3 M/UL
RBC # FLD: 26.9 %
WBC # FLD AUTO: 3.76 K/UL

## 2018-06-12 RX ORDER — AZACITIDINE FOR 100 MG/1
125 INJECTION, POWDER, LYOPHILIZED, FOR SOLUTION INTRAVENOUS; SUBCUTANEOUS ONCE
Qty: 0 | Refills: 0 | Status: COMPLETED | OUTPATIENT
Start: 2018-06-12 | End: 2018-06-12

## 2018-06-12 RX ORDER — ONDANSETRON 8 MG/1
8 TABLET, FILM COATED ORAL ONCE
Qty: 0 | Refills: 0 | Status: COMPLETED | OUTPATIENT
Start: 2018-06-12 | End: 2018-06-12

## 2018-06-12 RX ADMIN — AZACITIDINE FOR 125 MILLIGRAM(S): 100 INJECTION, POWDER, LYOPHILIZED, FOR SOLUTION INTRAVENOUS; SUBCUTANEOUS at 12:22

## 2018-06-12 RX ADMIN — ONDANSETRON 8 MILLIGRAM(S): 8 TABLET, FILM COATED ORAL at 12:21

## 2018-06-13 ENCOUNTER — APPOINTMENT (OUTPATIENT)
Dept: INFUSION THERAPY | Facility: CLINIC | Age: 78
End: 2018-06-13

## 2018-06-13 RX ORDER — AZACITIDINE FOR 100 MG/1
125 INJECTION, POWDER, LYOPHILIZED, FOR SOLUTION INTRAVENOUS; SUBCUTANEOUS ONCE
Qty: 0 | Refills: 0 | Status: COMPLETED | OUTPATIENT
Start: 2018-06-13 | End: 2018-06-13

## 2018-06-13 RX ORDER — ONDANSETRON 8 MG/1
8 TABLET, FILM COATED ORAL ONCE
Qty: 0 | Refills: 0 | Status: COMPLETED | OUTPATIENT
Start: 2018-06-13 | End: 2018-06-13

## 2018-06-13 RX ADMIN — ONDANSETRON 8 MILLIGRAM(S): 8 TABLET, FILM COATED ORAL at 11:21

## 2018-06-13 RX ADMIN — AZACITIDINE FOR 125 MILLIGRAM(S): 100 INJECTION, POWDER, LYOPHILIZED, FOR SOLUTION INTRAVENOUS; SUBCUTANEOUS at 11:28

## 2018-06-14 ENCOUNTER — LABORATORY RESULT (OUTPATIENT)
Age: 78
End: 2018-06-14

## 2018-06-14 ENCOUNTER — APPOINTMENT (OUTPATIENT)
Dept: INFUSION THERAPY | Facility: CLINIC | Age: 78
End: 2018-06-14

## 2018-06-14 RX ORDER — AZACITIDINE FOR 100 MG/1
125 INJECTION, POWDER, LYOPHILIZED, FOR SOLUTION INTRAVENOUS; SUBCUTANEOUS ONCE
Qty: 0 | Refills: 0 | Status: COMPLETED | OUTPATIENT
Start: 2018-06-14 | End: 2018-06-14

## 2018-06-14 RX ORDER — ONDANSETRON 8 MG/1
8 TABLET, FILM COATED ORAL ONCE
Qty: 0 | Refills: 0 | Status: COMPLETED | OUTPATIENT
Start: 2018-06-14 | End: 2018-06-14

## 2018-06-14 RX ADMIN — AZACITIDINE FOR 125 MILLIGRAM(S): 100 INJECTION, POWDER, LYOPHILIZED, FOR SOLUTION INTRAVENOUS; SUBCUTANEOUS at 14:06

## 2018-06-14 RX ADMIN — ONDANSETRON 8 MILLIGRAM(S): 8 TABLET, FILM COATED ORAL at 13:40

## 2018-06-15 ENCOUNTER — APPOINTMENT (OUTPATIENT)
Dept: INFUSION THERAPY | Facility: CLINIC | Age: 78
End: 2018-06-15

## 2018-06-15 RX ORDER — ONDANSETRON 8 MG/1
8 TABLET, FILM COATED ORAL ONCE
Qty: 0 | Refills: 0 | Status: COMPLETED | OUTPATIENT
Start: 2018-06-15 | End: 2018-06-15

## 2018-06-15 RX ORDER — AZACITIDINE FOR 100 MG/1
125 INJECTION, POWDER, LYOPHILIZED, FOR SOLUTION INTRAVENOUS; SUBCUTANEOUS ONCE
Qty: 0 | Refills: 0 | Status: COMPLETED | OUTPATIENT
Start: 2018-06-15 | End: 2018-06-15

## 2018-06-15 RX ADMIN — ONDANSETRON 8 MILLIGRAM(S): 8 TABLET, FILM COATED ORAL at 11:41

## 2018-06-15 RX ADMIN — AZACITIDINE FOR 125 MILLIGRAM(S): 100 INJECTION, POWDER, LYOPHILIZED, FOR SOLUTION INTRAVENOUS; SUBCUTANEOUS at 11:57

## 2018-06-16 ENCOUNTER — OTHER (OUTPATIENT)
Age: 78
End: 2018-06-16

## 2018-06-16 LAB
HCT VFR BLD CALC: 27.7 %
HGB BLD-MCNC: 9.3 G/DL
MCHC RBC-ENTMCNC: 31.1 PG
MCHC RBC-ENTMCNC: 33.6 G/DL
MCV RBC AUTO: 92.6 FL
PLATELET # BLD AUTO: 22 K/UL
PMV BLD: NORMAL
RBC # BLD: 2.99 M/UL
RBC # FLD: 27.4 %
WBC # FLD AUTO: 2.88 K/UL

## 2018-06-18 ENCOUNTER — APPOINTMENT (OUTPATIENT)
Dept: HEMATOLOGY ONCOLOGY | Facility: CLINIC | Age: 78
End: 2018-06-18

## 2018-06-18 ENCOUNTER — LABORATORY RESULT (OUTPATIENT)
Age: 78
End: 2018-06-18

## 2018-06-18 ENCOUNTER — APPOINTMENT (OUTPATIENT)
Dept: INFUSION THERAPY | Facility: CLINIC | Age: 78
End: 2018-06-18

## 2018-06-18 ENCOUNTER — OTHER (OUTPATIENT)
Age: 78
End: 2018-06-18

## 2018-06-18 LAB
HCT VFR BLD CALC: 24.3 %
HGB BLD-MCNC: 8.7 G/DL
MCHC RBC-ENTMCNC: 31.6 PG
MCHC RBC-ENTMCNC: 35.8 G/DL
MCV RBC AUTO: 88.4 FL
PLATELET # BLD AUTO: 19 K/UL
PMV BLD: NORMAL
RBC # BLD: 2.75 M/UL
RBC # FLD: 27.1 %
WBC # FLD AUTO: 4.8 K/UL

## 2018-06-18 RX ORDER — ONDANSETRON 8 MG/1
8 TABLET, FILM COATED ORAL ONCE
Qty: 0 | Refills: 0 | Status: COMPLETED | OUTPATIENT
Start: 2018-06-18 | End: 2018-06-18

## 2018-06-18 RX ORDER — AZACITIDINE FOR 100 MG/1
125 INJECTION, POWDER, LYOPHILIZED, FOR SOLUTION INTRAVENOUS; SUBCUTANEOUS ONCE
Qty: 0 | Refills: 0 | Status: COMPLETED | OUTPATIENT
Start: 2018-06-18 | End: 2018-06-18

## 2018-06-18 RX ORDER — ERYTHROPOIETIN 10000 [IU]/ML
40000 INJECTION, SOLUTION INTRAVENOUS; SUBCUTANEOUS ONCE
Qty: 0 | Refills: 0 | Status: COMPLETED | OUTPATIENT
Start: 2018-06-18 | End: 2018-06-18

## 2018-06-18 RX ADMIN — ONDANSETRON 8 MILLIGRAM(S): 8 TABLET, FILM COATED ORAL at 11:57

## 2018-06-18 RX ADMIN — AZACITIDINE FOR 125 MILLIGRAM(S): 100 INJECTION, POWDER, LYOPHILIZED, FOR SOLUTION INTRAVENOUS; SUBCUTANEOUS at 12:05

## 2018-06-18 RX ADMIN — ERYTHROPOIETIN 40000 UNIT(S): 10000 INJECTION, SOLUTION INTRAVENOUS; SUBCUTANEOUS at 11:57

## 2018-06-19 ENCOUNTER — APPOINTMENT (OUTPATIENT)
Dept: INFUSION THERAPY | Facility: CLINIC | Age: 78
End: 2018-06-19

## 2018-06-19 RX ORDER — AZACITIDINE FOR 100 MG/1
125 INJECTION, POWDER, LYOPHILIZED, FOR SOLUTION INTRAVENOUS; SUBCUTANEOUS ONCE
Qty: 0 | Refills: 0 | Status: COMPLETED | OUTPATIENT
Start: 2018-06-19 | End: 2018-06-19

## 2018-06-19 RX ORDER — ONDANSETRON 8 MG/1
8 TABLET, FILM COATED ORAL ONCE
Qty: 0 | Refills: 0 | Status: COMPLETED | OUTPATIENT
Start: 2018-06-19 | End: 2018-06-19

## 2018-06-19 RX ADMIN — ONDANSETRON 8 MILLIGRAM(S): 8 TABLET, FILM COATED ORAL at 14:09

## 2018-06-19 RX ADMIN — AZACITIDINE FOR 125 MILLIGRAM(S): 100 INJECTION, POWDER, LYOPHILIZED, FOR SOLUTION INTRAVENOUS; SUBCUTANEOUS at 14:24

## 2018-06-21 ENCOUNTER — LABORATORY RESULT (OUTPATIENT)
Age: 78
End: 2018-06-21

## 2018-06-21 ENCOUNTER — APPOINTMENT (OUTPATIENT)
Dept: HEMATOLOGY ONCOLOGY | Facility: CLINIC | Age: 78
End: 2018-06-21

## 2018-06-22 ENCOUNTER — APPOINTMENT (OUTPATIENT)
Dept: INFUSION THERAPY | Facility: CLINIC | Age: 78
End: 2018-06-22

## 2018-06-25 ENCOUNTER — APPOINTMENT (OUTPATIENT)
Dept: HEMATOLOGY ONCOLOGY | Facility: CLINIC | Age: 78
End: 2018-06-25

## 2018-06-25 ENCOUNTER — APPOINTMENT (OUTPATIENT)
Dept: INFUSION THERAPY | Facility: CLINIC | Age: 78
End: 2018-06-25

## 2018-06-25 ENCOUNTER — LABORATORY RESULT (OUTPATIENT)
Age: 78
End: 2018-06-25

## 2018-06-25 LAB
BLD GP AB SCN SERPL QL: NORMAL
HCT VFR BLD CALC: 21.7 %
HCT VFR BLD CALC: 24.9 %
HGB BLD-MCNC: 7.7 G/DL
HGB BLD-MCNC: 9 G/DL
MCHC RBC-ENTMCNC: 30.5 PG
MCHC RBC-ENTMCNC: 31.6 PG
MCHC RBC-ENTMCNC: 35.5 G/DL
MCHC RBC-ENTMCNC: 36.1 G/DL
MCV RBC AUTO: 84.4 FL
MCV RBC AUTO: 88.9 FL
PLATELET # BLD AUTO: 11 K/UL
PLATELET # BLD AUTO: 11 K/UL
PMV BLD: NORMAL
PMV BLD: NORMAL
RBC # BLD: 2.44 M/UL
RBC # BLD: 2.95 M/UL
RBC # FLD: 23.6 %
RBC # FLD: 26.4 %
WBC # FLD AUTO: 4.3 K/UL
WBC # FLD AUTO: 4.99 K/UL

## 2018-06-25 RX ORDER — ERYTHROPOIETIN 10000 [IU]/ML
40000 INJECTION, SOLUTION INTRAVENOUS; SUBCUTANEOUS ONCE
Qty: 0 | Refills: 0 | Status: COMPLETED | OUTPATIENT
Start: 2018-06-25 | End: 2018-06-25

## 2018-06-25 RX ADMIN — ERYTHROPOIETIN 40000 UNIT(S): 10000 INJECTION, SOLUTION INTRAVENOUS; SUBCUTANEOUS at 12:24

## 2018-06-28 ENCOUNTER — LABORATORY RESULT (OUTPATIENT)
Age: 78
End: 2018-06-28

## 2018-06-28 ENCOUNTER — APPOINTMENT (OUTPATIENT)
Dept: HEMATOLOGY ONCOLOGY | Facility: CLINIC | Age: 78
End: 2018-06-28

## 2018-06-28 LAB
ABO + RH PNL BLD: NORMAL
BLD GP AB SCN SERPL QL: NORMAL
HCT VFR BLD CALC: 22.6 %
HGB BLD-MCNC: 8.2 G/DL
MCHC RBC-ENTMCNC: 30.7 PG
MCHC RBC-ENTMCNC: 36.3 G/DL
MCV RBC AUTO: 84.6 FL
PLATELET # BLD AUTO: 8 K/UL
PMV BLD: 10.9 FL
RBC # BLD: 2.67 M/UL
RBC # FLD: 22.9 %
WBC # FLD AUTO: 5.67 K/UL

## 2018-06-29 ENCOUNTER — APPOINTMENT (OUTPATIENT)
Dept: INFUSION THERAPY | Facility: CLINIC | Age: 78
End: 2018-06-29

## 2018-07-02 ENCOUNTER — LABORATORY RESULT (OUTPATIENT)
Age: 78
End: 2018-07-02

## 2018-07-02 ENCOUNTER — APPOINTMENT (OUTPATIENT)
Dept: INFUSION THERAPY | Facility: CLINIC | Age: 78
End: 2018-07-02

## 2018-07-02 ENCOUNTER — APPOINTMENT (OUTPATIENT)
Dept: HEMATOLOGY ONCOLOGY | Facility: CLINIC | Age: 78
End: 2018-07-02

## 2018-07-02 RX ORDER — ERYTHROPOIETIN 10000 [IU]/ML
40000 INJECTION, SOLUTION INTRAVENOUS; SUBCUTANEOUS ONCE
Qty: 0 | Refills: 0 | Status: COMPLETED | OUTPATIENT
Start: 2018-07-02 | End: 2018-07-02

## 2018-07-02 RX ADMIN — ERYTHROPOIETIN 40000 UNIT(S): 10000 INJECTION, SOLUTION INTRAVENOUS; SUBCUTANEOUS at 12:43

## 2018-07-05 ENCOUNTER — LABORATORY RESULT (OUTPATIENT)
Age: 78
End: 2018-07-05

## 2018-07-05 ENCOUNTER — APPOINTMENT (OUTPATIENT)
Dept: HEMATOLOGY ONCOLOGY | Facility: CLINIC | Age: 78
End: 2018-07-05

## 2018-07-05 DIAGNOSIS — Z00.00 ENCOUNTER FOR GENERAL ADULT MEDICAL EXAMINATION W/OUT ABNORMAL FINDINGS: ICD-10-CM

## 2018-07-06 ENCOUNTER — APPOINTMENT (OUTPATIENT)
Dept: INFUSION THERAPY | Facility: CLINIC | Age: 78
End: 2018-07-06

## 2018-07-06 ENCOUNTER — RX RENEWAL (OUTPATIENT)
Age: 78
End: 2018-07-06

## 2018-07-06 RX ORDER — POTASSIUM CHLORIDE 1500 MG/1
20 TABLET, FILM COATED, EXTENDED RELEASE ORAL
Qty: 10 | Refills: 0 | Status: ACTIVE | COMMUNITY
Start: 2018-04-19 | End: 1900-01-01

## 2018-07-08 LAB
ABO + RH PNL BLD: NORMAL
ABO + RH PNL BLD: NORMAL
ALBUMIN SERPL ELPH-MCNC: 2.3 G/DL
ALP BLD-CCNC: 74 U/L
ALT SERPL-CCNC: 12 U/L
ANION GAP SERPL CALC-SCNC: 13 MMOL/L
AST SERPL-CCNC: 15 U/L
BILIRUB SERPL-MCNC: 6.4 MG/DL
BLD GP AB SCN SERPL QL: NORMAL
BLD GP AB SCN SERPL QL: NORMAL
BUN SERPL-MCNC: 26 MG/DL
CALCIUM SERPL-MCNC: 7.2 MG/DL
CHLORIDE SERPL-SCNC: 89 MMOL/L
CO2 SERPL-SCNC: 28 MMOL/L
CREAT SERPL-MCNC: 1.4 MG/DL
GLUCOSE SERPL-MCNC: 122 MG/DL
HCT VFR BLD CALC: 19.6 %
HCT VFR BLD CALC: 22.1 %
HGB BLD-MCNC: 7.4 G/DL
HGB BLD-MCNC: 8.1 G/DL
MCHC RBC-ENTMCNC: 30.5 PG
MCHC RBC-ENTMCNC: 31.2 PG
MCHC RBC-ENTMCNC: 36.7 G/DL
MCHC RBC-ENTMCNC: 37.8 G/DL
MCV RBC AUTO: 82.7 FL
MCV RBC AUTO: 83.1 FL
PLATELET # BLD AUTO: 13 K/UL
PLATELET # BLD AUTO: 15 K/UL
PMV BLD: NORMAL
PMV BLD: NORMAL
POTASSIUM SERPL-SCNC: 2.4 MMOL/L
PROT SERPL-MCNC: 3.8 G/DL
RBC # BLD: 2.37 M/UL
RBC # BLD: 2.66 M/UL
RBC # FLD: 22.1 %
RBC # FLD: 22.3 %
SODIUM SERPL-SCNC: 130 MMOL/L
WBC # FLD AUTO: 3.94 K/UL
WBC # FLD AUTO: 6.15 K/UL

## 2018-07-09 ENCOUNTER — APPOINTMENT (OUTPATIENT)
Dept: INFUSION THERAPY | Facility: CLINIC | Age: 78
End: 2018-07-09

## 2018-07-09 ENCOUNTER — LABORATORY RESULT (OUTPATIENT)
Age: 78
End: 2018-07-09

## 2018-07-09 ENCOUNTER — APPOINTMENT (OUTPATIENT)
Dept: HEMATOLOGY ONCOLOGY | Facility: CLINIC | Age: 78
End: 2018-07-09

## 2018-07-09 VITALS
WEIGHT: 134 LBS | DIASTOLIC BLOOD PRESSURE: 52 MMHG | TEMPERATURE: 96.5 F | HEIGHT: 62 IN | SYSTOLIC BLOOD PRESSURE: 104 MMHG | HEART RATE: 93 BPM | BODY MASS INDEX: 24.66 KG/M2 | RESPIRATION RATE: 16 BRPM

## 2018-07-09 LAB
HCT VFR BLD CALC: 24.7 %
HGB BLD-MCNC: 8.7 G/DL
MCHC RBC-ENTMCNC: 27.5 PG
MCHC RBC-ENTMCNC: 35.2 G/DL
MCV RBC AUTO: 78.2 FL
PLATELET # BLD AUTO: 16 K/UL
PMV BLD: NORMAL
RBC # BLD: 3.16 M/UL
RBC # FLD: 24.2 %
RETICS # AUTO: 0.3 %
RETICS AGGREG/RBC NFR: 8.2 K/UL
WBC # FLD AUTO: 17.59 K/UL

## 2018-07-09 RX ORDER — ERYTHROPOIETIN 10000 [IU]/ML
40000 INJECTION, SOLUTION INTRAVENOUS; SUBCUTANEOUS ONCE
Qty: 0 | Refills: 0 | Status: COMPLETED | OUTPATIENT
Start: 2018-07-09 | End: 2018-07-09

## 2018-07-09 RX ADMIN — ERYTHROPOIETIN 40000 UNIT(S): 10000 INJECTION, SOLUTION INTRAVENOUS; SUBCUTANEOUS at 16:21

## 2018-07-10 ENCOUNTER — OUTPATIENT (OUTPATIENT)
Dept: OUTPATIENT SERVICES | Facility: HOSPITAL | Age: 78
LOS: 1 days | Discharge: HOME | End: 2018-07-10

## 2018-07-10 ENCOUNTER — APPOINTMENT (OUTPATIENT)
Dept: INFUSION THERAPY | Facility: CLINIC | Age: 78
End: 2018-07-10

## 2018-07-10 DIAGNOSIS — Z90.710 ACQUIRED ABSENCE OF BOTH CERVIX AND UTERUS: Chronic | ICD-10-CM

## 2018-07-10 DIAGNOSIS — C93.10 CHRONIC MYELOMONOCYTIC LEUKEMIA NOT HAVING ACHIEVED REMISSION: ICD-10-CM

## 2018-07-10 LAB
ALBUMIN SERPL ELPH-MCNC: 2.6 G/DL
ALP BLD-CCNC: 79 U/L
ALT SERPL-CCNC: 15 U/L
ANION GAP SERPL CALC-SCNC: 21 MMOL/L
AST SERPL-CCNC: 46 U/L
BILIRUB DIRECT SERPL-MCNC: 6.6 MG/DL
BILIRUB INDIRECT SERPL-MCNC: 1.6 MG/DL
BILIRUB SERPL-MCNC: 8.3 MG/DL
BUN SERPL-MCNC: 26 MG/DL
CALCIUM SERPL-MCNC: 7.5 MG/DL
CHLORIDE SERPL-SCNC: 88 MMOL/L
CO2 SERPL-SCNC: 22 MMOL/L
CREAT SERPL-MCNC: 1.5 MG/DL
DIRECT COOMBS: NORMAL
GGT SERPL-CCNC: 32 U/L
GLUCOSE SERPL-MCNC: 81 MG/DL
HAPTOGLOB SERPL-MCNC: 26 MG/DL
LDH SERPL-CCNC: 862
POTASSIUM SERPL-SCNC: 2.9 MMOL/L
PROT SERPL-MCNC: 4.2 G/DL
SODIUM SERPL-SCNC: 131 MMOL/L

## 2018-07-11 ENCOUNTER — APPOINTMENT (OUTPATIENT)
Dept: INFUSION THERAPY | Facility: CLINIC | Age: 78
End: 2018-07-11

## 2018-07-12 ENCOUNTER — APPOINTMENT (OUTPATIENT)
Dept: INFUSION THERAPY | Facility: CLINIC | Age: 78
End: 2018-07-12

## 2018-07-13 ENCOUNTER — APPOINTMENT (OUTPATIENT)
Dept: INFUSION THERAPY | Facility: CLINIC | Age: 78
End: 2018-07-13

## 2018-07-16 ENCOUNTER — OUTPATIENT (OUTPATIENT)
Dept: OUTPATIENT SERVICES | Facility: HOSPITAL | Age: 78
LOS: 1 days | Discharge: HOME | End: 2018-07-16

## 2018-07-16 ENCOUNTER — LABORATORY RESULT (OUTPATIENT)
Age: 78
End: 2018-07-16

## 2018-07-16 ENCOUNTER — APPOINTMENT (OUTPATIENT)
Dept: INFUSION THERAPY | Facility: CLINIC | Age: 78
End: 2018-07-16

## 2018-07-16 ENCOUNTER — APPOINTMENT (OUTPATIENT)
Dept: HEMATOLOGY ONCOLOGY | Facility: CLINIC | Age: 78
End: 2018-07-16

## 2018-07-16 ENCOUNTER — INPATIENT (INPATIENT)
Facility: HOSPITAL | Age: 78
LOS: 7 days | End: 2018-07-24
Attending: INTERNAL MEDICINE | Admitting: INTERNAL MEDICINE

## 2018-07-16 VITALS
DIASTOLIC BLOOD PRESSURE: 44 MMHG | HEART RATE: 80 BPM | RESPIRATION RATE: 16 BRPM | TEMPERATURE: 97 F | SYSTOLIC BLOOD PRESSURE: 80 MMHG | OXYGEN SATURATION: 98 %

## 2018-07-16 VITALS
SYSTOLIC BLOOD PRESSURE: 71 MMHG | DIASTOLIC BLOOD PRESSURE: 41 MMHG | WEIGHT: 130 LBS | BODY MASS INDEX: 23.92 KG/M2 | RESPIRATION RATE: 16 BRPM | HEART RATE: 87 BPM | HEIGHT: 62 IN

## 2018-07-16 DIAGNOSIS — Z90.710 ACQUIRED ABSENCE OF BOTH CERVIX AND UTERUS: Chronic | ICD-10-CM

## 2018-07-16 DIAGNOSIS — C93.10 CHRONIC MYELOMONOCYTIC LEUKEMIA NOT HAVING ACHIEVED REMISSION: ICD-10-CM

## 2018-07-16 LAB
ALBUMIN SERPL ELPH-MCNC: 2.4 G/DL — LOW (ref 3.5–5.2)
ALP SERPL-CCNC: 94 U/L — SIGNIFICANT CHANGE UP (ref 30–115)
ALT FLD-CCNC: 32 U/L — SIGNIFICANT CHANGE UP (ref 0–41)
ANION GAP SERPL CALC-SCNC: 18 MMOL/L — HIGH (ref 7–14)
APTT BLD: 36.2 SEC — SIGNIFICANT CHANGE UP (ref 27–39.2)
AST SERPL-CCNC: 28 U/L — SIGNIFICANT CHANGE UP (ref 0–41)
BASE EXCESS BLDV CALC-SCNC: 5.8 MMOL/L — HIGH (ref -2–2)
BASOPHILS # BLD AUTO: 0.07 K/UL — SIGNIFICANT CHANGE UP (ref 0–0.2)
BASOPHILS NFR BLD AUTO: 0.2 % — SIGNIFICANT CHANGE UP (ref 0–1)
BILIRUB DIRECT SERPL-MCNC: 6.6 MG/DL — HIGH (ref 0–0.2)
BILIRUB INDIRECT FLD-MCNC: 2 MG/DL — HIGH (ref 0.2–1.2)
BILIRUB SERPL-MCNC: 8.6 MG/DL — HIGH (ref 0.2–1.2)
BLD GP AB SCN SERPL QL: SIGNIFICANT CHANGE UP
BUN SERPL-MCNC: 28 MG/DL — HIGH (ref 10–20)
CALCIUM SERPL-MCNC: 6.7 MG/DL — LOW (ref 8.5–10.1)
CHLORIDE SERPL-SCNC: 91 MMOL/L — LOW (ref 98–110)
CO2 SERPL-SCNC: 26 MMOL/L — SIGNIFICANT CHANGE UP (ref 17–32)
CREAT SERPL-MCNC: 1.2 MG/DL — SIGNIFICANT CHANGE UP (ref 0.7–1.5)
EOSINOPHIL # BLD AUTO: 0.16 K/UL — SIGNIFICANT CHANGE UP (ref 0–0.7)
EOSINOPHIL NFR BLD AUTO: 0.4 % — SIGNIFICANT CHANGE UP (ref 0–8)
GLUCOSE SERPL-MCNC: 97 MG/DL — SIGNIFICANT CHANGE UP (ref 70–99)
HCO3 BLDV-SCNC: 30 MMOL/L — HIGH (ref 22–29)
HCT VFR BLD CALC: 20.6 % — LOW (ref 37–47)
HGB BLD-MCNC: 7.5 G/DL — LOW (ref 12–16)
IMM GRANULOCYTES NFR BLD AUTO: 31.2 % — HIGH (ref 0.1–0.3)
INR BLD: 3.86 RATIO — HIGH (ref 0.65–1.3)
LACTATE BLDV-MCNC: 3.9 MMOL/L — HIGH (ref 0.5–1.6)
LACTATE SERPL-SCNC: 4.2 MMOL/L — CRITICAL HIGH (ref 0.5–2.2)
LIDOCAIN IGE QN: 25 U/L — SIGNIFICANT CHANGE UP (ref 7–60)
LYMPHOCYTES # BLD AUTO: 18.3 % — LOW (ref 20.5–51.1)
LYMPHOCYTES # BLD AUTO: 6.95 K/UL — HIGH (ref 1.2–3.4)
MAGNESIUM SERPL-MCNC: 1.5 MG/DL — LOW (ref 1.8–2.4)
MCHC RBC-ENTMCNC: 27.7 PG — SIGNIFICANT CHANGE UP (ref 27–31)
MCHC RBC-ENTMCNC: 36.4 G/DL — SIGNIFICANT CHANGE UP (ref 32–37)
MCV RBC AUTO: 76 FL — LOW (ref 81–99)
MONOCYTES # BLD AUTO: 7.77 K/UL — HIGH (ref 0.1–0.6)
MONOCYTES NFR BLD AUTO: 20.5 % — HIGH (ref 1.7–9.3)
NEUTROPHILS # BLD AUTO: 11.17 K/UL — HIGH (ref 1.4–6.5)
NEUTROPHILS NFR BLD AUTO: 29.4 % — LOW (ref 42.2–75.2)
NRBC # BLD: 3 /100 WBCS — HIGH (ref 0–0)
PCO2 BLDV: 43 MMHG — SIGNIFICANT CHANGE UP (ref 41–51)
PH BLDV: 7.46 — HIGH (ref 7.26–7.43)
PLATELET # BLD AUTO: 30 K/UL — LOW (ref 130–400)
PO2 BLDV: 19 MMHG — LOW (ref 20–40)
POTASSIUM SERPL-MCNC: 2.6 MMOL/L — CRITICAL LOW (ref 3.5–5)
POTASSIUM SERPL-SCNC: 2.6 MMOL/L — CRITICAL LOW (ref 3.5–5)
PROT SERPL-MCNC: 4.1 G/DL — LOW (ref 6–8)
PROTHROM AB SERPL-ACNC: >40 SEC — HIGH (ref 9.95–12.87)
RBC # BLD: 2.71 M/UL — LOW (ref 4.2–5.4)
RBC # FLD: 24.2 % — HIGH (ref 11.5–14.5)
SAO2 % BLDV: 27 % — SIGNIFICANT CHANGE UP
SODIUM SERPL-SCNC: 135 MMOL/L — SIGNIFICANT CHANGE UP (ref 135–146)
TROPONIN T SERPL-MCNC: <0.01 NG/ML — SIGNIFICANT CHANGE UP
TYPE + AB SCN PNL BLD: SIGNIFICANT CHANGE UP
WBC # BLD: 37.94 K/UL — HIGH (ref 4.8–10.8)
WBC # FLD AUTO: 37.94 K/UL — HIGH (ref 4.8–10.8)

## 2018-07-16 RX ORDER — MEROPENEM 1 G/30ML
INJECTION INTRAVENOUS
Qty: 0 | Refills: 0 | Status: DISCONTINUED | OUTPATIENT
Start: 2018-07-17 | End: 2018-07-24

## 2018-07-16 RX ORDER — HEPARIN SODIUM 5000 [USP'U]/ML
5000 INJECTION INTRAVENOUS; SUBCUTANEOUS EVERY 8 HOURS
Qty: 0 | Refills: 0 | Status: DISCONTINUED | OUTPATIENT
Start: 2018-07-16 | End: 2018-07-17

## 2018-07-16 RX ORDER — POTASSIUM CHLORIDE 20 MEQ
20 PACKET (EA) ORAL
Qty: 0 | Refills: 0 | Status: COMPLETED | OUTPATIENT
Start: 2018-07-16 | End: 2018-07-17

## 2018-07-16 RX ORDER — ONDANSETRON 8 MG/1
1 TABLET, FILM COATED ORAL
Qty: 0 | Refills: 0 | COMMUNITY

## 2018-07-16 RX ORDER — MAGNESIUM SULFATE 500 MG/ML
2 VIAL (ML) INJECTION ONCE
Qty: 0 | Refills: 0 | Status: COMPLETED | OUTPATIENT
Start: 2018-07-16 | End: 2018-07-16

## 2018-07-16 RX ORDER — SODIUM CHLORIDE 9 MG/ML
250 INJECTION INTRAMUSCULAR; INTRAVENOUS; SUBCUTANEOUS ONCE
Qty: 0 | Refills: 0 | Status: COMPLETED | OUTPATIENT
Start: 2018-07-16 | End: 2018-07-16

## 2018-07-16 RX ORDER — POTASSIUM CHLORIDE 20 MEQ
20 PACKET (EA) ORAL ONCE
Qty: 0 | Refills: 0 | Status: COMPLETED | OUTPATIENT
Start: 2018-07-16 | End: 2018-07-16

## 2018-07-16 RX ORDER — VANCOMYCIN HCL 1 G
1000 VIAL (EA) INTRAVENOUS ONCE
Qty: 0 | Refills: 0 | Status: COMPLETED | OUTPATIENT
Start: 2018-07-16 | End: 2018-07-16

## 2018-07-16 RX ORDER — MEROPENEM 1 G/30ML
1000 INJECTION INTRAVENOUS ONCE
Qty: 0 | Refills: 0 | Status: COMPLETED | OUTPATIENT
Start: 2018-07-16 | End: 2018-07-17

## 2018-07-16 RX ORDER — MEROPENEM 1 G/30ML
1000 INJECTION INTRAVENOUS EVERY 12 HOURS
Qty: 0 | Refills: 0 | Status: DISCONTINUED | OUTPATIENT
Start: 2018-07-17 | End: 2018-07-24

## 2018-07-16 RX ORDER — VANCOMYCIN HCL 1 G
1000 VIAL (EA) INTRAVENOUS EVERY 24 HOURS
Qty: 0 | Refills: 0 | Status: DISCONTINUED | OUTPATIENT
Start: 2018-07-17 | End: 2018-07-19

## 2018-07-16 RX ORDER — SODIUM CHLORIDE 9 MG/ML
1000 INJECTION INTRAMUSCULAR; INTRAVENOUS; SUBCUTANEOUS
Qty: 0 | Refills: 0 | Status: DISCONTINUED | OUTPATIENT
Start: 2018-07-16 | End: 2018-07-19

## 2018-07-16 RX ORDER — VANCOMYCIN HCL 1 G
VIAL (EA) INTRAVENOUS
Qty: 0 | Refills: 0 | Status: DISCONTINUED | OUTPATIENT
Start: 2018-07-16 | End: 2018-07-19

## 2018-07-16 RX ADMIN — Medication 50 GRAM(S): at 22:00

## 2018-07-16 RX ADMIN — Medication 50 MILLIEQUIVALENT(S): at 23:00

## 2018-07-16 RX ADMIN — SODIUM CHLORIDE 75 MILLILITER(S): 9 INJECTION INTRAMUSCULAR; INTRAVENOUS; SUBCUTANEOUS at 23:01

## 2018-07-16 RX ADMIN — SODIUM CHLORIDE 500 MILLILITER(S): 9 INJECTION INTRAMUSCULAR; INTRAVENOUS; SUBCUTANEOUS at 21:15

## 2018-07-16 NOTE — H&P ADULT - NSHPPHYSICALEXAM_GEN_ALL_CORE
GENERAL: Thin woman, appears stated age, in NAD  HEENT: NC/AT, PERRL, sclericterus, small erythematous lesions on hard palate and underneath tongue  CARDIOVASCULAR: regular rate, normal S1 S2 appreciated  PULMONARY: Lungs CTA B/L  GASTROINTESTINAL: Abdomen soft, nontender, +HSM  EXTREMITIES: 2+ pitting edema B/L LE  NEUROLOGICAL: AAOx3, no focal deficits  SKIN: +jaundice, small umbilicated lesions with erythematous base, non-blanching GENERAL: Thin woman, appears stated age, in NAD  HEENT: NC/AT, PERRL, sclericterus, small erythematous lesions on hard palate and underneath tongue  CARDIOVASCULAR: regular rate, normal S1 S2 appreciated  PULMONARY: Lungs CTA B/L  GASTROINTESTINAL: Abdomen soft, nontender, +HSM  EXTREMITIES: 2+ pitting edema B/L LE  NEUROLOGICAL: AAOx3, no focal deficits  SKIN: +jaundice, small umbilicated lesions with erythematous base, non-blanching around the mouth

## 2018-07-16 NOTE — ED PROVIDER NOTE - PROGRESS NOTE DETAILS
I personally evaluated the patient. I reviewed the Resident’s or Physician Assistant’s note (as assigned above), and agree with the findings and plan except as documented in my note.  79 Y/O F CML (LAST CHEMO 3 WEEKS AGO), NHL (NOT CURRENTLY BEING TREATED) SENT TO ED FROM [facility] FOR ADMISSION FOR ANEMIA AND JAUNDICE. PT WITH FREQUENT BLOOD TRANSFUSIONS AND WAS LAST TRANSFUSED 3 WEEKS AGO. DR. HERMAN NOTED JAUNDICE ONE WEEK AGO AND PT HAD RUQ SONO WHICH WAS REPORTEDLY NORMAL. CHEMO WAS HELD LAST WEEK FOR JAUNDICE. JAUNDICE HAS BEEN PROGRESSIVELY WORSENING. NO ABD PAIN. + WEAKNESS AND SEVERE FATIGUE. NO FEVER ,CHILLS. NO N/V/D. NO CP, SOB, COUGH. NORMAL URINATION. VITALS NOTED. ALERT OX3 + CHRONICALLY ILL. + JAUNDICE. + SCLERAL ICTERUS. OP NORMAL. MMM. NECK SUPPLE. Ct Ab/Pelvis with contrast to look for Lymphoma infiltrated into liver causing Dr. head service. Spoke with Gutierrez I personally evaluated the patient. I reviewed the Resident’s or Physician Assistant’s note (as assigned above), and agree with the findings and plan except as documented in my note.  77 Y/O F CML (LAST CHEMO 3 WEEKS AGO), NHL (NOT CURRENTLY BEING TREATED) SENT TO ED FROM [facility] FOR ADMISSION FOR ANEMIA AND JAUNDICE. PT WITH FREQUENT BLOOD TRANSFUSIONS AND WAS LAST TRANSFUSED 3 WEEKS AGO. DR. HERMAN NOTED JAUNDICE ONE WEEK AGO AND PT HAD RUQ SONO WHICH WAS REPORTEDLY NORMAL. CHEMO WAS HELD LAST WEEK FOR JAUNDICE. JAUNDICE HAS BEEN PROGRESSIVELY WORSENING. NO ABD PAIN. + WEAKNESS AND SEVERE FATIGUE. NO FEVER ,CHILLS. NO N/V/D. NO CP, SOB, COUGH. NORMAL URINATION. VITALS NOTED. ALERT OX3 + CHRONICALLY ILL. + JAUNDICE. + SCLERAL ICTERUS. OP NORMAL. MMM. NECK SUPPLE. LUNGS CLEAR B/L. RRR S1S2. ABD- SOFT DISTENDED NONTENDER. NO HSM. + B/L PITTING LE EDEMA. NEURO EXAM NONFOCAL. NO PETECHIAE. NO RASH.

## 2018-07-16 NOTE — ED PROVIDER NOTE - CADM POA URETHRAL CATHETER
From: Donald N Behnke Jr  To: Bright Blanchard MD  Sent: 2/28/2018 7:46 AM CST  Subject: Meds for Don..    Melisa.  Would you order some drugs for Don: Potassium CL ER 20 MEQ MICRO TAB, and ROSUVASTATIN 5MG TAB.   Please let me know you got this.    Thank you, Anny  
No

## 2018-07-16 NOTE — H&P ADULT - ASSESSMENT
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 Jackson's office for jaundice, hypotension, and anemia.     Problem/Plan:    #Leukocytosis with Lactic Acidosis, possibly secondary to sepsis vs. Acute CML:  - Will give IVF for elevated lactate, monitor for signs of fluid overload. Repeat lactate in AM  - Will start Sherin and Vanco for broad coverage  - No blasts seen on CBC with diff, so not likely blast crisis  - Will consult Oncology for further eval    #Anemia, Hyperbilirubinemia, Elevated INR, HSM, consider cirrhosis workup:  - RUQ US ordered  - check Hepatitis panel  - Hemolysis workup, check iron studies, FOBT  - GI eval    #Signs of hypervolemia, possibly 2/2 hypoalbuminemia:  - minimal left costophrenic blunting on CXR, 2+ pitting edema of LE, ascites  - monitor fluid status carefully while on IVF    #DVT ppx:  - Heparin SubQ    #Dispo:  - will get PT eval since patient has been increasingly weak and lives alone 79 y/o F with PMH of NHL diagnosed 5 years ago and recently diagnosed CML this past October presented from Dr. Jackson's office for jaundice, hypotension, and anemia.     Problem/Plan:    #Leukocytosis with Lactic Acidosis, possibly secondary to sepsis vs. CML:  - Will give IVF for elevated lactate, monitor for signs of fluid overload. Repeat lactate in AM  - Will start Sherin and Vanco for broad coverage  - No blasts seen on CBC with diff, so not likely blast crisis  - Will consult Oncology for further eval    #Anemia, Hyperbilirubinemia, Elevated INR, HSM, consider cirrhosis workup:  - RUQ US ordered  - check Hepatitis panel  - Hemolysis workup, check iron studies, FOBT  - GI eval    #Signs of hypervolemia, possibly 2/2 hypoalbuminemia:  - minimal left costophrenic blunting on CXR, 2+ pitting edema of LE, ascites  - monitor fluid status carefully while on IVF    #DVT ppx:  - Heparin SubQ    #Dispo:  - will get PT eval since patient has been increasingly weak and lives alone

## 2018-07-16 NOTE — ED PROVIDER NOTE - OBJECTIVE STATEMENT
78 year old female with a pmh of hodgkins lymphoma dx 5 year ago and recently diagnosed CML was sent in by heme/onc dr. larkin for hypotension, anemia & jaundice. As per patient's daughter patient has been jaundiced for the last week. Denies fevers, chills, n/v, cp, sob or abdominal pain. 78 year old female with a pmh of hodgkins lymphoma dx 5 year ago and recently diagnosed CML on chemo (Vanaz - last chemo 3 weeks ago) was sent in by heme/onc dr. larkin for hypotension, anemia & jaundice. As per patient's daughter patient has been jaundiced for the last week. Denies fevers, chills, n/v, cp, sob or abdominal pain.

## 2018-07-16 NOTE — ED PROVIDER NOTE - NS ED ROS FT
Constitutional:  See HPI.  ENMT:  No neck pain or stiffness.  Cardiac:  No chest pain  Respiratory:  No cough or  GI:  No nausea, vomiting, diarrhea or abdominal pain.  :  No dysuria, frequency or burning.

## 2018-07-16 NOTE — ED PROVIDER NOTE - CARE PLAN
Principal Discharge DX:	Anemia  Secondary Diagnosis:	CML (chronic myelocytic leukemia)  Secondary Diagnosis:	Jaundice

## 2018-07-16 NOTE — H&P ADULT - NSHPLABSRESULTS_GEN_ALL_CORE
Complete Blood Count + Automated Diff (07.16.18 @ 17:05)    WBC Count: 37.94: NOTIFIED DR SOLARES ON 07/16/18 18:15  DIFFERENTIAL:(SEGS=28%;[id number]%;  MONOS=9%;BASO=1%;PROMYELOCYTE=24%;   MYELOCYTE=5%;METAMYELOCYTE=8%;       REACTIVE  LYMPHOCYTE=6%)                              RBC  MORPHOLOGY=(HYPOCHROMIA=SLIGHT;  MICRO=SLIGHT;MACRO=SLIGHT;             ANISO=SLIGHT;POIK=SLIGHT;OVAL=SLIGHT;     TARGET=SLIGHT) K/uL    RBC Count: 2.71 M/uL    Hemoglobin: 7.5: NOTIFIED DR SOLARES ON 07/16/18 18:15 g/dL    Hematocrit: 20.6: This result has been called to DR SOLARES by Madan Titus on 07 16 2018 at  1831, and has been read back. %    Mean Cell Volume: 76.0 fL    Mean Cell Hemoglobin: 27.7 pg    Mean Cell Hemoglobin Conc: 36.4 g/dL    Red Cell Distrib Width: 24.2 %    Platelet Count - Automated: 30: PLATELETS CONFIRMED ON SMEAR.NOTIFIED DR SOLARES ON 07/16/18 18:15 K/uL    Auto Neutrophil #: 11.17 K/uL    Auto Lymphocyte #: 6.95 K/uL    Auto Monocyte #: 7.77 K/uL    Auto Eosinophil #: 0.16 K/uL    Auto Basophil #: 0.07 K/uL    Auto Neutrophil %: 29.4: Differential percentages must be correlated with absolute numbers for  clinical significance. %    Auto Lymphocyte %: 18.3 %    Auto Monocyte %: 20.5 %    Auto Eosinophil %: 0.4 %    Auto Basophil %: 0.2 %    Auto Immature Granulocyte %: 31.2 %    Comprehensive Metabolic Panel (05.27.18 @ 12:20)    Sodium, Serum: 131 mmol/L    Potassium, Serum: 2.5: Notified Dr Behseta on 05/27/18 14:17 mmol/L    Chloride, Serum: 87 mmol/L    Carbon Dioxide, Serum: 31 mmol/L    Anion Gap, Serum: 13 mmol/L    Blood Urea Nitrogen, Serum: 33 mg/dL    Creatinine, Serum: 1.2 mg/dL    Glucose, Serum: 164 mg/dL    Calcium, Total Serum: 7.7 mg/dL    Protein Total, Serum: 5.2 g/dL    Albumin, Serum: 3.1 g/dL    Bilirubin Total, Serum: 3.0 mg/dL    Alkaline Phosphatase, Serum: 110 U/L    Aspartate Aminotransferase (AST/SGOT): 25 U/L    Alanine Aminotransferase (ALT/SGPT): 16 U/L    eGFR if Non : 44: Interpretative comment    Prothrombin Time and INR, Plasma in AM (07.16.18 @ 17:05)    Prothrombin Time, Plasma: >40.00 sec    INR: 3.86: NO ANTICOAGULANT,NORMAL            0.65 -  1.30  ORAL ANTICOAGULANT,STD DOSE        2.00 - 3.00  MECHANICAL HEART VALVES            2.50 - 3.50  NOTE: INR RESULTS ARE INTENDED FOR USE ONLY TO  MONITOR  ORAL ANTICOAGULANT THERAPY IN STABILIZED  PATIENTS. ratio    Lactate, Blood: 4.2:  (07.16.18 @ 17:05)    < from: US Abdomen Complete (07.10.18 @ 11:50) >    Splenomegaly.    Mild ascites.    Enlarged liver.    < end of copied text >

## 2018-07-16 NOTE — ED PROVIDER NOTE - PHYSICAL EXAMINATION
CONSTITUTIONAL: WA / WN / NAD  HEAD: NCAT  EYES: PERRL; EOMI; scleral icterus   ENT: Normal pharynx; mucous membranes pink/moist, no erythema.  NECK: Supple;   CARD: RRR; nl S1/S2; no M/R/G.   RESP: Respiratory rate and effort are normal; breath sounds clear and equal bilaterally.  ABD: Soft, NT ND   MSK/EXT: No gross deformities; full range of motion. b/l le edema  SKIN: Jaundiced  NEURO: AAOx3,   PSYCH: Memory Intact, Normal Affect

## 2018-07-16 NOTE — ED PROVIDER NOTE - ATTENDING CONTRIBUTION TO CARE
I have personally performed a history and physical exam on this patient and personally directed the management of the patient.,

## 2018-07-16 NOTE — H&P ADULT - HISTORY OF PRESENT ILLNESS
77 y/o F with PMH of NHL diagnosed 5 years ago and recently diagnosed CML this past October presented from Dr. Jackson's office for jaundice, hypotension, and anemia. Patient did not notice that she was jaundiced, but she did feel extremely weak. Patient lives at home alone and is able to independently perform her ADLs.  She knows that when she feels this weak, she likely needs blood transfusion. However, during her last appt, she was not transfused because she was told her hemoglobin was not that low. Vitals in ED were 77 y/o F with PMH of NHL diagnosed 5 years ago and recently diagnosed CML this past October presented from Dr. Jackson's office for jaundice, hypotension, and anemia. Patient did not notice that she was jaundiced, but she did feel extremely weak. Patient lives at home alone and is able to independently perform her ADLs.  She knows that when she feels this weak, she likely needs blood transfusion. However, during her last appt, she was not transfused because she was told her hemoglobin was not that low. Vitals in ED were T 97.3F, BP 80/44, HR 80, RR 16, SpO2 98% on room air. She was also found to have anemia, leukocytosis 79 y/o F with PMH of NHL diagnosed 5 years ago and recently diagnosed CML this past October presented from Dr. Jackson's office for jaundice, hypotension, and anemia. Patient did not notice that she was jaundiced, but she did feel extremely weak. Patient lives at home alone and is able to independently perform her ADLs.  She knows that when she feels this weak, she likely needs blood transfusion. However, during her last appt, she was not transfused because she was told her hemoglobin was not that low. Patient denied fever, chills, headache, dizziness, chest pain, SOB, n/v/d, or dysuria. Patient also reported that her legs were more swollen than usual because she has been walking less for the past 2 weeks due to weakness. Vitals in ED were T 97.3F, BP 80/44, HR 80, RR 16, SpO2 98% on room air. She was also found to have anemia, leukocytosis with a left shift and monocytosis, hypokalemia, hypomagnasemia, hyperbilirubinemia, elevated INR, lactic acidosis, and ascites and hepatosplenomegaly on RUQ US. In ED, she received 2U PRBC, 250cc NS bolus with maintenance NS @ 75 cc/hr, 2gm Mag rider, and 80meq of potassium.    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 At that time noted to have thrombocytopenia, s/p Bone marrow biopsy for suspected bone marrow involvement. It was hypercellular, however flow from bone marrow was negative.  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 Jackson said it wasn't effective and she may need alternative therapy. 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 Jackson's office for jaundice, hypotension, and anemia. Patient did not notice that she was jaundiced, but she did feel extremely weak. Patient lives at home alone and is able to independently perform her ADLs. She knows that when she feels this weak, she likely needs blood transfusion. However, during her last appt, she was not transfused because she was told her hemoglobin was not that low. Patient denied fever, chills, headache, dizziness, chest pain, SOB, n/v/d, or dysuria. Patient also reported that her legs were more swollen than usual because she has been walking less for the past 2 weeks due to weakness. Vitals in ED were T 97.3F, BP 80/44, HR 80, RR 16, SpO2 98% on room air. She was also found to have anemia, leukocytosis with a left shift and monocytosis, hypokalemia, hypomagnasemia, hyperbilirubinemia, elevated INR, lactic acidosis, and ascites and hepatosplenomegaly on RUQ US. In ED, she received 2U PRBC, 250cc NS bolus with maintenance NS @ 75 cc/hr, 2gm Mag rider, and 80meq of potassium.    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 At that time noted to have thrombocytopenia, s/p Bone marrow biopsy for suspected bone marrow involvement. It was hypercellular, however flow from bone marrow was negative.  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 Jackson said it wasn't effective and she may need alternative therapy. 77 y/o F with PMH of NHL diagnosed 5 years ago and recently diagnosed CML this past October presented from Dr. Jackson's office for jaundice, hypotension, and anemia. Patient did not notice that she was jaundiced, but she did feel extremely weak. Patient lives at home alone and is able to independently perform her ADLs. She knows that when she feels this weak, she likely needs blood transfusion. However, during her last appt, she was not transfused because she was told her hemoglobin was not that low. Patient denied fever, chills, headache, dizziness, chest pain, SOB, n/v/d, or dysuria. Patient also reported that her legs were more swollen than usual because she has been walking less for the past 2 weeks due to weakness. Vitals in ED were T 97.3F, BP 80/44, HR 80, RR 16, SpO2 98% on room air. She was also found to have anemia, leukocytosis with a left shift and monocytosis, hypokalemia, hypomagnasemia, hyperbilirubinemia, elevated INR, lactic acidosis, and ascites and hepatosplenomegaly on RUQ US from 7/10. In ED, she received 2U PRBC, 250cc NS bolus with maintenance NS @ 75 cc/hr, 2gm Mag rider, and 80meq of potassium.    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 At that time noted to have thrombocytopenia, s/p Bone marrow biopsy for suspected bone marrow involvement. It was hypercellular, however flow from bone marrow was negative.  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 Jackson said it wasn't effective and she may need alternative therapy.

## 2018-07-17 ENCOUNTER — APPOINTMENT (OUTPATIENT)
Dept: INFUSION THERAPY | Facility: CLINIC | Age: 78
End: 2018-07-17

## 2018-07-17 PROBLEM — D69.6 THROMBOCYTOPENIA, UNSPECIFIED: Chronic | Status: ACTIVE | Noted: 2018-05-27

## 2018-07-17 PROBLEM — C93.10 CHRONIC MYELOMONOCYTIC LEUKEMIA NOT HAVING ACHIEVED REMISSION: Chronic | Status: ACTIVE | Noted: 2018-05-27

## 2018-07-17 LAB
ALBUMIN SERPL ELPH-MCNC: 2.1 G/DL — LOW (ref 3.5–5.2)
ALBUMIN SERPL ELPH-MCNC: 2.3 G/DL
ALP BLD-CCNC: 93 U/L
ALP SERPL-CCNC: 82 U/L — SIGNIFICANT CHANGE UP (ref 30–115)
ALT FLD-CCNC: 31 U/L — SIGNIFICANT CHANGE UP (ref 0–41)
ALT SERPL-CCNC: 32 U/L
ANION GAP SERPL CALC-SCNC: 16 MMOL/L — HIGH (ref 7–14)
ANION GAP SERPL CALC-SCNC: 17 MMOL/L
APTT BLD: 42.8 SEC — HIGH (ref 27–39.2)
AST SERPL-CCNC: 29 U/L
AST SERPL-CCNC: 35 U/L — SIGNIFICANT CHANGE UP (ref 0–41)
BASOPHILS # BLD AUTO: 0.04 K/UL — SIGNIFICANT CHANGE UP (ref 0–0.2)
BASOPHILS NFR BLD AUTO: 0.2 % — SIGNIFICANT CHANGE UP (ref 0–1)
BILIRUB DIRECT SERPL-MCNC: 6.3 MG/DL — HIGH (ref 0–0.2)
BILIRUB INDIRECT FLD-MCNC: 1.4 MG/DL — HIGH (ref 0.2–1.2)
BILIRUB SERPL-MCNC: 7.7 MG/DL — HIGH (ref 0.2–1.2)
BILIRUB SERPL-MCNC: 8.9 MG/DL
BUN SERPL-MCNC: 24 MG/DL — HIGH (ref 10–20)
BUN SERPL-MCNC: 28 MG/DL
CALCIUM SERPL-MCNC: 6 MG/DL — LOW (ref 8.5–10.1)
CALCIUM SERPL-MCNC: 6.8 MG/DL
CHLORIDE SERPL-SCNC: 90 MMOL/L
CHLORIDE SERPL-SCNC: 98 MMOL/L — SIGNIFICANT CHANGE UP (ref 98–110)
CO2 SERPL-SCNC: 24 MMOL/L — SIGNIFICANT CHANGE UP (ref 17–32)
CO2 SERPL-SCNC: 27 MMOL/L
CREAT SERPL-MCNC: 1.1 MG/DL — SIGNIFICANT CHANGE UP (ref 0.7–1.5)
CREAT SERPL-MCNC: 1.2 MG/DL
D DIMER BLD IA.RAPID-MCNC: 3202 NG/ML DDU — HIGH (ref 0–230)
EOSINOPHIL # BLD AUTO: 0.1 K/UL — SIGNIFICANT CHANGE UP (ref 0–0.7)
EOSINOPHIL NFR BLD AUTO: 0.4 % — SIGNIFICANT CHANGE UP (ref 0–8)
FERRITIN SERPL-MCNC: 8729 NG/ML — HIGH (ref 15–150)
FIBRINOGEN PPP-MCNC: 143 MG/DL — LOW (ref 204.4–570.6)
GLUCOSE SERPL-MCNC: 88 MG/DL — SIGNIFICANT CHANGE UP (ref 70–99)
GLUCOSE SERPL-MCNC: 98 MG/DL
HAV IGM SER-ACNC: SIGNIFICANT CHANGE UP
HBV CORE IGM SER-ACNC: SIGNIFICANT CHANGE UP
HBV SURFACE AG SER-ACNC: SIGNIFICANT CHANGE UP
HCT VFR BLD CALC: 21.2 %
HCT VFR BLD CALC: 23.6 % — LOW (ref 37–47)
HCT VFR BLD CALC: 24.7 % — LOW (ref 37–47)
HCV AB S/CO SERPL IA: 0.17 S/CO — SIGNIFICANT CHANGE UP
HCV AB SERPL-IMP: SIGNIFICANT CHANGE UP
HGB BLD-MCNC: 7.6 G/DL
HGB BLD-MCNC: 8.7 G/DL — LOW (ref 12–16)
HGB BLD-MCNC: 9.1 G/DL — LOW (ref 12–16)
IMM GRANULOCYTES NFR BLD AUTO: 28.8 % — HIGH (ref 0.1–0.3)
INR BLD: 3.14 RATIO — HIGH (ref 0.65–1.3)
IRON SATN MFR SERPL: 89 UG/DL — SIGNIFICANT CHANGE UP (ref 35–150)
LACTATE SERPL-SCNC: 2.1 MMOL/L — SIGNIFICANT CHANGE UP (ref 0.5–2.2)
LACTATE SERPL-SCNC: 2.2 MMOL/L — SIGNIFICANT CHANGE UP (ref 0.5–2.2)
LDH SERPL L TO P-CCNC: 1153 — HIGH (ref 50–242)
LYMPHOCYTES # BLD AUTO: 14.7 % — LOW (ref 20.5–51.1)
LYMPHOCYTES # BLD AUTO: 3.72 K/UL — HIGH (ref 1.2–3.4)
MAGNESIUM SERPL-MCNC: 1.7 MG/DL — LOW (ref 1.8–2.4)
MCHC RBC-ENTMCNC: 27.6 PG — SIGNIFICANT CHANGE UP (ref 27–31)
MCHC RBC-ENTMCNC: 27.7 PG
MCHC RBC-ENTMCNC: 27.9 PG — SIGNIFICANT CHANGE UP (ref 27–31)
MCHC RBC-ENTMCNC: 35.8 G/DL
MCHC RBC-ENTMCNC: 36.8 G/DL — SIGNIFICANT CHANGE UP (ref 32–37)
MCHC RBC-ENTMCNC: 36.9 G/DL — SIGNIFICANT CHANGE UP (ref 32–37)
MCV RBC AUTO: 74.8 FL — LOW (ref 81–99)
MCV RBC AUTO: 75.6 FL — LOW (ref 81–99)
MCV RBC AUTO: 77.4 FL
MONOCYTES # BLD AUTO: 4.86 K/UL — HIGH (ref 0.1–0.6)
MONOCYTES NFR BLD AUTO: 19.2 % — HIGH (ref 1.7–9.3)
NEUTROPHILS # BLD AUTO: 9.32 K/UL — HIGH (ref 1.4–6.5)
NEUTROPHILS NFR BLD AUTO: 36.7 % — LOW (ref 42.2–75.2)
NRBC # BLD: 3 /100 WBCS — HIGH (ref 0–0)
NRBC # BLD: 4 /100 WBCS — HIGH (ref 0–0)
PLATELET # BLD AUTO: 16 K/UL — CRITICAL LOW (ref 130–400)
PLATELET # BLD AUTO: 23 K/UL
PLATELET # BLD AUTO: 62 K/UL — LOW (ref 130–400)
PMV BLD: NORMAL
POTASSIUM SERPL-MCNC: 2.7 MMOL/L — CRITICAL LOW (ref 3.5–5)
POTASSIUM SERPL-SCNC: 2.6 MMOL/L
POTASSIUM SERPL-SCNC: 2.7 MMOL/L — CRITICAL LOW (ref 3.5–5)
PROT SERPL-MCNC: 3.5 G/DL — LOW (ref 6–8)
PROT SERPL-MCNC: 4.1 G/DL
PROTHROM AB SERPL-ACNC: 33.5 SEC — HIGH (ref 9.95–12.87)
RBC # BLD: 2.74 M/UL
RBC # BLD: 3.12 M/UL — LOW (ref 4.2–5.4)
RBC # BLD: 3.3 M/UL — LOW (ref 4.2–5.4)
RBC # FLD: 20.9 % — HIGH (ref 11.5–14.5)
RBC # FLD: 21.1 % — HIGH (ref 11.5–14.5)
RBC # FLD: 24.8 %
SODIUM SERPL-SCNC: 134 MMOL/L
SODIUM SERPL-SCNC: 138 MMOL/L — SIGNIFICANT CHANGE UP (ref 135–146)
TIBC SERPL-MCNC: <106 UG/DL — LOW (ref 220–430)
UIBC SERPL-MCNC: <17 UG/DL — LOW (ref 110–370)
WBC # BLD: 23.49 K/UL — HIGH (ref 4.8–10.8)
WBC # BLD: 25.33 K/UL — HIGH (ref 4.8–10.8)
WBC # FLD AUTO: 23.49 K/UL — HIGH (ref 4.8–10.8)
WBC # FLD AUTO: 25.33 K/UL — HIGH (ref 4.8–10.8)
WBC # FLD AUTO: 33.5 K/UL

## 2018-07-17 RX ORDER — POTASSIUM CHLORIDE 20 MEQ
20 PACKET (EA) ORAL ONCE
Qty: 0 | Refills: 0 | Status: COMPLETED | OUTPATIENT
Start: 2018-07-17 | End: 2018-07-17

## 2018-07-17 RX ORDER — PHYTONADIONE (VIT K1) 5 MG
5 TABLET ORAL ONCE
Qty: 0 | Refills: 0 | Status: COMPLETED | OUTPATIENT
Start: 2018-07-17 | End: 2018-07-17

## 2018-07-17 RX ORDER — MAGNESIUM SULFATE 500 MG/ML
2 VIAL (ML) INJECTION ONCE
Qty: 0 | Refills: 0 | Status: COMPLETED | OUTPATIENT
Start: 2018-07-17 | End: 2018-07-17

## 2018-07-17 RX ORDER — CYCLOBENZAPRINE HYDROCHLORIDE 10 MG/1
5 TABLET, FILM COATED ORAL ONCE
Qty: 0 | Refills: 0 | Status: COMPLETED | OUTPATIENT
Start: 2018-07-17 | End: 2018-07-17

## 2018-07-17 RX ADMIN — Medication 20 MILLIEQUIVALENT(S): at 13:23

## 2018-07-17 RX ADMIN — Medication 250 MILLIGRAM(S): at 22:52

## 2018-07-17 RX ADMIN — MEROPENEM 100 MILLIGRAM(S): 1 INJECTION INTRAVENOUS at 17:05

## 2018-07-17 RX ADMIN — Medication 250 MILLIGRAM(S): at 00:44

## 2018-07-17 RX ADMIN — Medication 50 MILLIEQUIVALENT(S): at 13:55

## 2018-07-17 RX ADMIN — Medication 5 MILLIGRAM(S): at 13:55

## 2018-07-17 RX ADMIN — MEROPENEM 100 MILLIGRAM(S): 1 INJECTION INTRAVENOUS at 00:43

## 2018-07-17 RX ADMIN — Medication 50 MILLIEQUIVALENT(S): at 01:31

## 2018-07-17 RX ADMIN — Medication 50 MILLIEQUIVALENT(S): at 07:16

## 2018-07-17 RX ADMIN — CYCLOBENZAPRINE HYDROCHLORIDE 5 MILLIGRAM(S): 10 TABLET, FILM COATED ORAL at 17:05

## 2018-07-17 RX ADMIN — Medication 50 GRAM(S): at 11:56

## 2018-07-17 RX ADMIN — SODIUM CHLORIDE 75 MILLILITER(S): 9 INJECTION INTRAMUSCULAR; INTRAVENOUS; SUBCUTANEOUS at 10:45

## 2018-07-17 RX ADMIN — HEPARIN SODIUM 5000 UNIT(S): 5000 INJECTION INTRAVENOUS; SUBCUTANEOUS at 06:25

## 2018-07-17 RX ADMIN — MEROPENEM 100 MILLIGRAM(S): 1 INJECTION INTRAVENOUS at 06:28

## 2018-07-17 NOTE — CONSULT NOTE ADULT - SUBJECTIVE AND OBJECTIVE BOX
HPI:  77 y/o F with PMH of NHL diagnosed 5 years ago and recently diagnosed CML this past October presented from Dr. Jackson's office for jaundice, hypotension, and anemia. Patient did not notice that she was jaundiced, but she did feel extremely weak. Patient lives at home alone and is able to independently perform her ADLs. She knows that when she feels this weak, she likely needs blood transfusion. However, during her last appt, she was not transfused because she was told her hemoglobin was not that low. Patient denied fever, chills, headache, dizziness, chest pain, SOB, n/v/d, or dysuria. Patient also reported that her legs were more swollen than usual because she has been walking less for the past 2 weeks due to weakness. Vitals in ED were T 97.3F, BP 80/44, HR 80, RR 16, SpO2 98% on room air. She was also found to have anemia, leukocytosis with a left shift and monocytosis, hypokalemia, hypomagnasemia, hyperbilirubinemia, elevated INR, lactic acidosis, and ascites and hepatosplenomegaly on RUQ US from 7/10. In ED, she received 2U PRBC, 250cc NS bolus with maintenance NS @ 75 cc/hr, 2gm Mag rider, and 80meq of potassium.    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 At that time noted to have thrombocytopenia, s/p Bone marrow biopsy for suspected bone marrow involvement. It was hypercellular, however flow from bone marrow was negative.  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 Jackson said it wasn't effective and she may need alternative therapy. (16 Jul 2018 20:41)      PAST MEDICAL & SURGICAL HISTORY:  Thrombocytopenia  Chronic myelomonocytic leukemia not having achieved remission  Non-Hodgkins lymphoma  S/P total abdominal hysterectomy      Hospital Course:    TODAY'S SUBJECTIVE & REVIEW OF SYMPTOMS:     Constitutional WNL   Cardio WNL   Resp WNL   GI WNL  Heme WNL  Endo WNL  Skin WNL  MSK Weakness  Neuro WNL  Cognitive WNL  Psych WNL      MEDICATIONS  (STANDING):  heparin  Injectable 5000 Unit(s) SubCutaneous every 8 hours  meropenem  IVPB      meropenem  IVPB 1000 milliGRAM(s) IV Intermittent every 12 hours  potassium chloride  20 mEq/100 mL IVPB 20 milliEquivalent(s) IV Intermittent every 2 hours  sodium chloride 0.9%. 1000 milliLiter(s) (75 mL/Hr) IV Continuous <Continuous>  vancomycin  IVPB      vancomycin  IVPB 1000 milliGRAM(s) IV Intermittent every 24 hours    MEDICATIONS  (PRN):      FAMILY HISTORY:  No pertinent family history in first degree relatives      Allergies    penicillin (Unknown)    Intolerances        SOCIAL HISTORY:    [  ] Etoh  [  ] Smoking  [  ] Substance abuse     Home Environment:  [  ] Home Alone  [xx  ] Lives with Family  [  ] Home Health Aid    Dwelling:  [  ] Apartment  [x  ] Private House  [  ] Adult Home  [  ] Skilled Nursing Facility      [  ] Short Term  [  ] Long Term  [ x ] Stairs       Elevator [  ]    FUNCTIONAL STATUS PTA: (Check all that apply)  Ambulation: [ x  ]Independent    [  ] Dependent     [  ] Non-Ambulatory  Assistive Device: [  ] SA Cane  [  ]  Q Cane  [  ] Walker  [  ]  Wheelchair  ADL : [x  ] Independent  [  ]  Dependent       Vital Signs Last 24 Hrs  T(C): 35.6 (17 Jul 2018 04:50), Max: 36.5 (16 Jul 2018 20:05)  T(F): 96.1 (17 Jul 2018 04:50), Max: 97.7 (16 Jul 2018 20:05)  HR: 76 (17 Jul 2018 04:50) (76 - 80)  BP: 102/50 (17 Jul 2018 04:50) (73/43 - 109/59)  BP(mean): --  RR: 18 (17 Jul 2018 04:50) (16 - 20)  SpO2: 94% (17 Jul 2018 07:55) (94% - 98%)      PHYSICAL EXAM: Alert & Oriented X3  GENERAL: NAD, well-groomed, well-developed  HEAD:  Atraumatic, Normocephalic  CHEST/LUNG: Clear  HEART: S1S2+  ABDOMEN: Soft, Nontender  EXTREMITIES:  no calf tenderness    NERVOUS SYSTEM:  Cranial Nerves 2-12 intact [  ] Abnormal  [  ]  ROM: WFL all extremities [ x ]  Abnormal [  ]  Motor Strength: WFL all extremities  [ x ]  Abnormal [  ]  Sensation: intact to light touch [ x ] Abnormal [  ]  Reflexes: Symmetric [  ]  Abnormal [  ]    FUNCTIONAL STATUS:  Bed Mobility: Independent [  ]  Supervision [  ]  Needs Assistance [ x ]  N/A [  ]  Transfers: Independent [  ]  Supervision [  ]  Needs Assistance [ x ]  N/A [  ]   Ambulation: Independent [  ]  Supervision [  ]  Needs Assistance [ x ]  N/A [  ]  ADL: Independent [  ] Requires Assistance [  ] N/A [  ]      LABS:                        9.1    25.33 )-----------( 16       ( 17 Jul 2018 08:02 )             24.7     07-17    138  |  98  |  24<H>  ----------------------------<  88  2.7<LL>   |  24  |  1.1    Ca    6.0<L>      17 Jul 2018 08:02  Mg     1.7     07-17    TPro  3.5<L>  /  Alb  2.1<L>  /  TBili  7.7<H>  /  DBili  6.3<H>  /  AST  35  /  ALT  31  /  AlkPhos  82  07-17    PT/INR - ( 17 Jul 2018 08:02 )   PT: 33.50 sec;   INR: 3.14 ratio         PTT - ( 17 Jul 2018 08:02 )  PTT:42.8 sec      RADIOLOGY & ADDITIONAL STUDIES:    Assesment:

## 2018-07-17 NOTE — PROGRESS NOTE ADULT - SUBJECTIVE AND OBJECTIVE BOX
GALA PHILLIPS, female, 78y ([birth year]),   [id number]  Admit Date: 07-16-18 (1d)    CODE STATUS:  DISPO:    Chief Complaint:  Patient is a 78y old  Female who presents with a chief complaint of weakness (16 Jul 2018 23:57)      Interval History:  No acute events overnight. No complaints at this time.    Past Medical and Surgical History:  PAST MEDICAL & SURGICAL HISTORY:  Thrombocytopenia  Chronic myelomonocytic leukemia not having achieved remission  Non-Hodgkins lymphoma  S/P total abdominal hysterectomy      Current Medications:  MEDICATIONS  (STANDING):  cyclobenzaprine 5 milliGRAM(s) Oral once  meropenem  IVPB      meropenem  IVPB 1000 milliGRAM(s) IV Intermittent every 12 hours  potassium chloride  20 mEq/100 mL IVPB 20 milliEquivalent(s) IV Intermittent every 2 hours  sodium chloride 0.9%. 1000 milliLiter(s) (75 mL/Hr) IV Continuous <Continuous>  vancomycin  IVPB      vancomycin  IVPB 1000 milliGRAM(s) IV Intermittent every 24 hours    MEDICATIONS  (PRN):        Vital Signs:  T(F): 97.2 (07-17-18 @ 13:55), Max: 97.7 (07-16-18 @ 20:05)  HR: 76 (07-17-18 @ 13:55) (76 - 80)  BP: 100/47 (07-17-18 @ 13:55) (73/43 - 109/59)  RR: 18 (07-17-18 @ 13:55) (16 - 20)  SpO2: 94% (07-17-18 @ 07:55) (94% - 98%)  CAPILLARY BLOOD GLUCOSE          Physical Exam:  General: Not in distress.   HEENT: Moist mucus membranes. PERRLA.  Cardio: Regular rate and rhythm, S1, S2, no murmur, rub, or gallop.  Pulm: Clear to auscultation bilaterally. No wheezing, rales, or rhonchi.  Abdomen: Soft, non-tender, non-distended. Normoactive bowel sounds.  Extremities: No cyanosis or edema bilaterally. No calf tenderness to palpation.  Neuro: A&O x3. No focal deficits. CN II - XII grossly intact.    Labs and Imaging:  CBC Full  -  ( 17 Jul 2018 08:02 )  WBC Count : 25.33 K/uL  Hemoglobin : 9.1 g/dL  Hematocrit : 24.7 %  Platelet Count - Automated : 16 K/uL  Mean Cell Volume : 74.8 fL  Mean Cell Hemoglobin : 27.6 pg  Mean Cell Hemoglobin Concentration : 36.8 g/dL  Auto Neutrophil # : 9.32 K/uL  Auto Lymphocyte # : 3.72 K/uL  Auto Monocyte # : 4.86 K/uL  Auto Eosinophil # : 0.10 K/uL  Auto Basophil # : 0.04 K/uL  Auto Neutrophil % : 36.7 %  Auto Lymphocyte % : 14.7 %  Auto Monocyte % : 19.2 %  Auto Eosinophil % : 0.4 %  Auto Basophil % : 0.2 %    RDW: 20.9  24.2    PT/INR/PTT: 07-17-18 @ 08:02  33.50 | 42.8        ^      3.14  PT/INR/PTT: 07-16-18 @ 17:05  >40.00 | 36.2        ^      3.86    BMP: 07-17-18 @ 08:02  138 | 98 | 24   -----------------< 88  2.7  | 24 | 1.1  eGFR(AA): 56, eGFR (non-AA): 49  Ca 6.0, Mg 1.7, P --  BMP: 07-16-18 @ 17:05  135 | 91 | 28   -----------------< 97  2.6  | 26 | 1.2  eGFR(AA): 51, eGFR (non-AA): 44  Ca 6.7, Mg 1.5, P --    LFTs: 07-17-18 @ 08:02  TP  3.5  | 2.1 Alb   ---------------  TB  7.7  | 6.3 DB   ---------------  ALT 31  | 35  AST            ^          82  ALK  LFTs: 07-16-18 @ 17:05  TP  4.1  | 2.4 Alb   ---------------  TB  8.6  | 6.6 DB   ---------------  ALT 32  | 28  AST            ^          94  ALK      LFTs: 07-17-18 @ 08:02  Ca  6.0  | 35 AST   -----------------  TP  3.5  | 31 ALT  -----------------  Alb 2.1  | 82 ALK          ^        7.7         TB  LFTs: 07-16-18 @ 17:05  Ca  6.7  | 28 AST   -----------------  TP  4.1  | 32 ALT  -----------------  Alb 2.4  | 94 ALK          ^        8.6         TB    < from: US Abdomen Limited (07.17.18 @ 10:10) >  IMPRESSION:    Liver is mildly lobulated in contour and heterogeneous in echotexture   consistent with liver disease.    No evidence of biliary duct dilatation.    Contracted gallbladder with stones.    Small to moderate right pleural effusion with atelectasis.    Increased ascites, now mild to moderate and previously mild    < end of copied text >      < from: CT Abdomen and Pelvis w/ IV Cont (07.17.18 @ 13:07) >  IMPRESSION:   1.  Since May 27, 2018, new moderate volume abdominopelvic ascites.    2.  Slightly improved splenomegaly now measuring 15 cm, previously 18 cm   with persistent heterogeneous enhancement.    3.  Mildly nodular hepatic contour,may be seen with cirrhosis; no focal   hepatic lesions or biliary ductal dilation.        <       Home Medications:  Home Medications:

## 2018-07-17 NOTE — CONSULT NOTE ADULT - SUBJECTIVE AND OBJECTIVE BOX
Chief Complaint:  Patient is a 78y old  Female who presents with a chief complaint of weakness (2018 23:57)      HPI:    79 y/o F with PMH of marginal zone lymphoma of face diagnosed 5 years ago and recently diagnosed CML this past October presented from Dr. Jackson's office for jaundice, hypotension, and anemia. Patient did not notice that she was jaundiced, but she did feel extremely weak. Patient lives at home alone and is able to independently perform her ADLs. She knows that when she feels this weak, she likely needs blood transfusion. However, during her last appt, she was not transfused because she was told her hemoglobin was not that low. Patient denied fever, chills, headache, dizziness, chest pain, SOB, n/v/d, or dysuria. Patient also reported that her legs were more swollen than usual because she has been walking less for the past 2 weeks due to weakness. Vitals in ED were T 97.3F, BP 80/44, HR 80, RR 16, SpO2 98% on room air. She was also found to have anemia, leukocytosis with a left shift and monocytosis, hypokalemia, hypomagnasemia, hyperbilirubinemia, elevated INR, lactic acidosis, and ascites and hepatosplenomegaly on RUQ US from 7/10. In ED, she received 2U PRBC, 250cc NS bolus with maintenance NS @ 75 cc/hr, 2gm Mag rider, and 80meq of potassium.    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 At that time noted to have thrombocytopenia, s/p Bone marrow biopsy for suspected bone marrow involvement. It was hypercellular, however flow from bone marrow was negative.  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 Jackson said it wasn't effective and she may need alternative therapy.      Allergies:  penicillin (Unknown)      Home Medications:    Hospital Medications:  heparin  Injectable 5000 Unit(s) SubCutaneous every 8 hours  meropenem  IVPB      meropenem  IVPB 1000 milliGRAM(s) IV Intermittent every 12 hours  potassium chloride  20 mEq/100 mL IVPB 20 milliEquivalent(s) IV Intermittent every 2 hours  sodium chloride 0.9%. 1000 milliLiter(s) IV Continuous <Continuous>  vancomycin  IVPB      vancomycin  IVPB 1000 milliGRAM(s) IV Intermittent every 24 hours      PMHX/PSHX:  Thrombocytopenia  Chronic myelomonocytic leukemia not having achieved remission  Non-Hodgkins lymphoma  S/P total abdominal hysterectomy      Family history:  No pertinent family history in first degree relatives      Social History:     ROS:     General:  No wt loss, fevers, chills, night sweats, fatigue,   Eyes:  Good vision, no reported pain  ENT:  No sore throat, pain, runny nose, dysphagia  CV:  No pain, palpitations, hypo/hypertension  Resp:  No dyspnea, cough, tachypnea, wheezing  GI:  See HPI  :  No pain, bleeding, incontinence, nocturia  Muscle:  No pain, weakness  Neuro:  No weakness, tingling, memory problems  Psych:  No fatigue, insomnia, mood problems, depression  Endocrine:  No polyuria, polydipsia, cold/heat intolerance  Heme:  No petechiae, ecchymosis, easy bruisability  Skin:  No rash, edema      PHYSICAL EXAM:     GENERAL:  Lying on bed , comfortbale  HEENT:  + pallor , JAUNDICE , scleral icterus  CHEST:    HEART:    ABDOMEN:    EXTREMITIES:    SKIN:    NEURO:      Vital Signs:  Vital Signs Last 24 Hrs  T(C): 35.6 (2018 04:50), Max: 36.5 (2018 20:05)  T(F): 96.1 (2018 04:50), Max: 97.7 (2018 20:05)  HR: 76 (2018 04:50) (76 - 80)  BP: 102/50 (2018 04:50) (73/43 - 109/59)  BP(mean): --  RR: 18 (2018 04:50) (16 - 20)  SpO2: 94% (2018 07:55) (94% - 98%)  Daily Height in cm: 157.48 (2018 00:00)    Daily Weight in k.7 (2018 00:00)    LABS:                        7.5    37.94 )-----------( 30       ( 2018 17:05 )             20.6     Hematocrit: 20.6 % ( @ 17:05)  Hemoglobin: 7.5 g/dL ( @ 17:05)          135  |  91<L>  |  28<H>  ----------------------------<  97  2.6<LL>   |  26  |  1.2    Ca    6.7<L>      2018 17:05  Mg     1.5         TPro  4.1<L>  /  Alb  2.4<L>  /  TBili  8.6<H>  /  DBili  6.6<H>  /  AST  28  /  ALT  32  /  AlkPhos  94      Alkaline Phosphatase, Serum: 94 U/L (18 @ 17:05)  Bilirubin Direct, Serum: 6.6 mg/dL (18 @ 17:05)  Aspartate Aminotransferase (AST/SGOT): 28 U/L (18 @ 17:05)  Bilirubin Total, Serum: 8.6 mg/dL (18 @ 17:05)  Alanine Aminotransferase (ALT/SGPT): 32 U/L (18 @ 17:05)      Imaging:      < from: US Abdomen Complete (07.10.18 @ 11:50) >  IMPRESSION:  Splenomegaly.    Mild ascites.    Enlarged liver.    Thank you for the courtesy of this consult.    < end of copied text > Chief Complaint:  Patient is a 78y old  Female who presents with a chief complaint of weakness (2018 23:57)      HPI:    79 y/o F with PMH of marginal zone lymphoma of face diagnosed 5 years ago and recently diagnosed CML this past October presented from Dr. Jackson's office for jaundice, hypotension, and anemia. Patient did not notice that she was jaundiced, but she did feel extremely weak. Patient lives at home alone and is able to independently perform her ADLs. She knows that when she feels this weak, she likely needs blood transfusion. However, during her last appt, she was not transfused because she was told her hemoglobin was not that low. Patient denied fever, chills, headache, dizziness, chest pain, SOB, n/v/d, or dysuria. Patient also reported that her legs were more swollen than usual because she has been walking less for the past 2 weeks due to weakness. Vitals in ED were T 97.3F, BP 80/44, HR 80, RR 16, SpO2 98% on room air. She was also found to have anemia, leukocytosis with a left shift and monocytosis, hypokalemia, hypomagnasemia, hyperbilirubinemia, elevated INR, lactic acidosis, and ascites and hepatosplenomegaly on RUQ US from 7/10. In ED, she received 2U PRBC, 250cc NS bolus with maintenance NS @ 75 cc/hr, 2gm Mag rider, and 80meq of potassium.    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 At that time noted to have thrombocytopenia, s/p Bone marrow biopsy for suspected bone marrow involvement. It was hypercellular, however flow from bone marrow was negative.  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 Jackson said it wasn't effective and she may need alternative therapy.  Pt never had EGD or colonoscopy done.  Denies alcohol use . Quit smoking several years ago.  Denies OTC medications as tylenol , advil or motrin.    Allergies:  penicillin (Unknown)      Home Medications:    Hospital Medications:  heparin  Injectable 5000 Unit(s) SubCutaneous every 8 hours  meropenem  IVPB      meropenem  IVPB 1000 milliGRAM(s) IV Intermittent every 12 hours  potassium chloride  20 mEq/100 mL IVPB 20 milliEquivalent(s) IV Intermittent every 2 hours  sodium chloride 0.9%. 1000 milliLiter(s) IV Continuous <Continuous>  vancomycin  IVPB      vancomycin  IVPB 1000 milliGRAM(s) IV Intermittent every 24 hours      PMHX/PSHX:  Thrombocytopenia  Chronic myelomonocytic leukemia not having achieved remission  Non-Hodgkins lymphoma  S/P total abdominal hysterectomy      Family history:  No pertinent family history in first degree relatives      Social History:     ROS:     General:  No wt loss, fevers, chills, night sweats, fatigue,   Eyes:  Good vision, no reported pain  ENT:  No sore throat, pain, runny nose, dysphagia  CV:  No pain, palpitations, hypo/hypertension  Resp:  No dyspnea, cough, tachypnea, wheezing  GI:  See HPI  :  No pain, bleeding, incontinence, nocturia  Muscle:  No pain, weakness  Neuro:  No weakness, tingling, memory problems  Psych:  No fatigue, insomnia, mood problems, depression  Endocrine:  No polyuria, polydipsia, cold/heat intolerance  Heme:  No petechiae, ecchymosis, easy bruisability  Skin:  No rash, edema      PHYSICAL EXAM:     GENERAL:  Lying on bed , comfortbale  HEENT:  + pallor , JAUNDICE , scleral icterus  CHEST:    HEART:    ABDOMEN:    EXTREMITIES:    SKIN:    NEURO:      Vital Signs:  Vital Signs Last 24 Hrs  T(C): 35.6 (2018 04:50), Max: 36.5 (2018 20:05)  T(F): 96.1 (2018 04:50), Max: 97.7 (2018 20:05)  HR: 76 (2018 04:50) (76 - 80)  BP: 102/50 (2018 04:50) (73/43 - 109/59)  BP(mean): --  RR: 18 (2018 04:50) (16 - 20)  SpO2: 94% (2018 07:55) (94% - 98%)  Daily Height in cm: 157.48 (2018 00:00)    Daily Weight in k.7 (2018 00:00)    LABS:                        7.5    37.94 )-----------( 30       ( 2018 17:05 )             20.6     Hematocrit: 20.6 % ( 17:05)  Hemoglobin: 7.5 g/dL ( @ 17:05)          135  |  91<L>  |  28<H>  ----------------------------<  97  2.6<LL>   |  26  |  1.2    Ca    6.7<L>      2018 17:05  Mg     1.5         TPro  4.1<L>  /  Alb  2.4<L>  /  TBili  8.6<H>  /  DBili  6.6<H>  /  AST  28  /  ALT  32  /  AlkPhos  94      Alkaline Phosphatase, Serum: 94 U/L (18 @ 17:05)  Bilirubin Direct, Serum: 6.6 mg/dL (18 @ 17:05)  Aspartate Aminotransferase (AST/SGOT): 28 U/L (18 @ 17:05)  Bilirubin Total, Serum: 8.6 mg/dL (18 @ 17:05)  Alanine Aminotransferase (ALT/SGPT): 32 U/L (18 @ 17:05)      Imaging:      < from: US Abdomen Complete (07.10.18 @ 11:50) >  IMPRESSION:  Splenomegaly.    Mild ascites.    Enlarged liver.    Thank you for the courtesy of this consult.    < end of copied text > Chief Complaint:  Patient is a 78y old  Female who presents with a chief complaint of weakness (2018 23:57)      HPI:    77 y/o F with PMH of marginal zone lymphoma of face diagnosed 5 years ago and recently diagnosed CML this past October presented from Dr. Jackson's office for jaundice, hypotension, and anemia. Patient did not notice that she was jaundiced, but she did feel extremely weak. Patient lives at home alone and is able to independently perform her ADLs. She knows that when she feels this weak, she likely needs blood transfusion. However, during her last appt, she was not transfused because she was told her hemoglobin was not that low. Patient denied fever, chills, headache, dizziness, chest pain, SOB, n/v/d, or dysuria. Patient also reported that her legs were more swollen than usual because she has been walking less for the past 2 weeks due to weakness. Vitals in ED were T 97.3F, BP 80/44, HR 80, RR 16, SpO2 98% on room air. She was also found to have anemia, leukocytosis with a left shift and monocytosis, hypokalemia, hypomagnasemia, hyperbilirubinemia, elevated INR, lactic acidosis, and ascites and hepatosplenomegaly on RUQ US from 7/10. In ED, she received 2U PRBC, 250cc NS bolus with maintenance NS @ 75 cc/hr, 2gm Mag rider, and 80meq of potassium.    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 At that time noted to have thrombocytopenia, s/p Bone marrow biopsy for suspected bone marrow involvement. It was hypercellular, however flow from bone marrow was negative.  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 Jackson said it wasn't effective and she may need alternative therapy.  Pt never had EGD or colonoscopy done.  Denies alcohol use . Quit smoking several years ago.  Denies OTC medications as tylenol , advil or motrin.    Allergies:  penicillin (Unknown)      Home Medications:    Hospital Medications:  heparin  Injectable 5000 Unit(s) SubCutaneous every 8 hours  meropenem  IVPB      meropenem  IVPB 1000 milliGRAM(s) IV Intermittent every 12 hours  potassium chloride  20 mEq/100 mL IVPB 20 milliEquivalent(s) IV Intermittent every 2 hours  sodium chloride 0.9%. 1000 milliLiter(s) IV Continuous <Continuous>  vancomycin  IVPB      vancomycin  IVPB 1000 milliGRAM(s) IV Intermittent every 24 hours      PMHX/PSHX:  Thrombocytopenia  Chronic myelomonocytic leukemia not having achieved remission  Non-Hodgkins lymphoma  S/P total abdominal hysterectomy      Family history:  No pertinent family history in first degree relatives      Social History:     ROS:     General: c/o dec appetite and generalized weakness   Eyes:  Good vision, no reported pain  ENT:  No sore throat, pain, runny nose, dysphagia  CV:  No pain, palpitations, hypo/hypertension  Resp:  No dyspnea, cough, tachypnea, wheezing  GI:  See HPI  :  No pain, bleeding, incontinence, nocturia  Muscle:  No pain, weakness  Neuro:  No weakness, tingling, memory problems  Psych:  No fatigue, insomnia, mood problems, depression  Endocrine:  No polyuria, polydipsia, cold/heat intolerance  Heme:  No petechiae, ecchymosis, easy bruisability  Skin:  rash around perioral area      PHYSICAL EXAM:     GENERAL:  Lying on bed , comfortbale  HEENT:  + pallor , JAUNDICE , scleral icterus  CHEST:  CTA b/l  HEART:  s1 + s2 , no murmurs rubs gallops.  ABDOMEN: soft nd bs+ ; RONA brown stools in rectal vault.    EXTREMITIES: pitting edema 1+   SKIN:  small  ecchymotic lesions around rosenda oral area  NEURO:  ao x 3 , no deficits.    Vital Signs:  Vital Signs Last 24 Hrs  T(C): 35.6 (2018 04:50), Max: 36.5 (2018 20:05)  T(F): 96.1 (2018 04:50), Max: 97.7 (2018 20:05)  HR: 76 (2018 04:50) (76 - 80)  BP: 102/50 (2018 04:50) (73/43 - 109/59)  BP(mean): --  RR: 18 (2018 04:50) (16 - 20)  SpO2: 94% (2018 07:55) (94% - 98%)  Daily Height in cm: 157.48 (2018 00:00)    Daily Weight in k.7 (2018 00:00)    LABS:                        7.5    37.94 )-----------( 30       ( 2018 17:05 )             20.6     Hematocrit: 20.6 % ( @ 17:05)  Hemoglobin: 7.5 g/dL ( 17:05)          135  |  91<L>  |  28<H>  ----------------------------<  97  2.6<LL>   |  26  |  1.2    Ca    6.7<L>      2018 17:05  Mg     1.5         TPro  4.1<L>  /  Alb  2.4<L>  /  TBili  8.6<H>  /  DBili  6.6<H>  /  AST  28  /  ALT  32  /  AlkPhos  94      Alkaline Phosphatase, Serum: 94 U/L (18 @ 17:05)  Bilirubin Direct, Serum: 6.6 mg/dL (18 @ 17:05)  Aspartate Aminotransferase (AST/SGOT): 28 U/L (18 @ 17:05)  Bilirubin Total, Serum: 8.6 mg/dL (18 @ 17:05)  Alanine Aminotransferase (ALT/SGPT): 32 U/L (18 @ 17:05)      Imaging:      < from: US Abdomen Complete (07.10.18 @ 11:50) >  IMPRESSION:  Splenomegaly.    Mild ascites.    Enlarged liver.    Thank you for the courtesy of this consult.    < end of copied text >

## 2018-07-17 NOTE — PROGRESS NOTE ADULT - ASSESSMENT
77 y/o F with CMML p/w hypotension, worsening fatigue and  jaundice, and anemia    1. Jaundice with leukocytosis and hypotension:   --Unclear etiology. ?Sepsis vs liver failure. Cannot r/o cholestasis/obstruction from CMML, although unlikely with normal LFTs and no CBD dilatation.   --Check CT abdomen pelvis  --C/W IVF and vanco and meropenem, check blood and urine culture  -- GI evaluation    2. Elevated INR: ?Vitamin K deficiency vs Liver failure vs DIC  --Give 1 dose of PO vit K  --Check DIC panel, peripheral smear    3. CMML: On vidaza. Hold chemo for now    4. C/W meds for other co-morbidities. Replete lytes and check at 4 pm. Repeat LA level    5. DVT/GI ppx: Sequentials for now

## 2018-07-17 NOTE — PROGRESS NOTE ADULT - SUBJECTIVE AND OBJECTIVE BOX
Pt seen and examined at bedside. No fresh complaints.    Vital Signs Last 24 Hrs  T(C): 35.6 (17 Jul 2018 04:50), Max: 36.5 (16 Jul 2018 20:05)  T(F): 96.1 (17 Jul 2018 04:50), Max: 97.7 (16 Jul 2018 20:05)  HR: 76 (17 Jul 2018 04:50) (76 - 80)  BP: 102/50 (17 Jul 2018 04:50) (73/43 - 109/59)  BP(mean): --  RR: 18 (17 Jul 2018 04:50) (16 - 20)  SpO2: 94% (17 Jul 2018 07:55) (94% - 98%)    MEDICATIONS  (STANDING):  heparin  Injectable 5000 Unit(s) SubCutaneous every 8 hours  meropenem  IVPB      meropenem  IVPB 1000 milliGRAM(s) IV Intermittent every 12 hours  phytonadione   Solution 5 milliGRAM(s) Oral once  potassium chloride    Tablet ER 20 milliEquivalent(s) Oral once  potassium chloride  20 mEq/100 mL IVPB 20 milliEquivalent(s) IV Intermittent once  potassium chloride  20 mEq/100 mL IVPB 20 milliEquivalent(s) IV Intermittent every 2 hours  sodium chloride 0.9%. 1000 milliLiter(s) (75 mL/Hr) IV Continuous <Continuous>  vancomycin  IVPB      vancomycin  IVPB 1000 milliGRAM(s) IV Intermittent every 24 hours    Labs:                        9.1    25.33 )-----------( 16       ( 17 Jul 2018 08:02 )             24.7             07-17    138  |  98  |  24<H>  ----------------------------<  88  2.7<LL>   |  24  |  1.1    Ca    6.0<L>      17 Jul 2018 08:02  Mg     1.7     07-17    TPro  3.5<L>  /  Alb  2.1<L>  /  TBili  7.7<H>  /  DBili  6.3<H>  /  AST  35  /  ALT  31  /  AlkPhos  82  07-17    LIVER FUNCTIONS - ( 17 Jul 2018 08:02 )  Alb: 2.1 g/dL / Pro: 3.5 g/dL / ALK PHOS: 82 U/L / ALT: 31 U/L / AST: 35 U/L / GGT: x                 PT/INR - ( 17 Jul 2018 08:02 )   PT: 33.50 sec;   INR: 3.14 ratio         PTT - ( 17 Jul 2018 08:02 )  PTT:42.8 sec  CARDIAC MARKERS ( 16 Jul 2018 17:05 )  x     / <0.01 ng/mL / x     / x     / x          < from: Xray Chest 1 View- PORTABLE-Urgent (07.16.18 @ 17:53) >  Impression:      Small left pleural effusion.    Question right upper lobe atelectasis.    < end of copied text >    < from: US Abdomen Limited (07.17.18 @ 10:10) >  IMPRESSION:    Liver is mildly lobulated in contour and heterogeneous in echotexture   consistent with liver disease.    No evidence of biliary duct dilatation.    Contracted gallbladder with stones.    Small to moderate right pleural effusion with atelectasis.    Increased ascites, now mild to moderate and previously mild    < end of copied text >

## 2018-07-17 NOTE — CONSULT NOTE ADULT - ASSESSMENT
IMPRESSION: Rehab of gait dysfunction      PRECAUTIONS: [  ] Cardiac  [  ] Respiratory  [  ] Seizures [  ] Contact Isolation  [  ] Droplet Isolation  [ x ] Other - thrombocytopenia    Weight Bearing Status:     RECOMMENDATION: no ambulation training if platelet count < 20,000                                  ----------------------------------------------------------                                   Out of Bed to Chair     DVT/Decubiti Prophylaxis    REHAB PLAN:     [ x  ] Bedside P/T 3-5 times a week   [   ]   Bedside O/T  2-3 times a week             [   ] No Rehab Therapy Indicated                   [   ]  Speech Therapy   Conditioning/ROM                                    ADL  Bed Mobility                                               Conditioning/ROM  Transfers                                                     Bed Mobility  Sitting /Standing Balance                         Transfers                                        Gait Training                                               Sitting/Standing Balance  Stair Training [   ]Applicable                    Home equipment Eval                                                                        Splinting  [   ] Only      GOALS:   ADL   [   ]   Independent                    Transfers  [ x  ] Independent                          Ambulation  [ xx  ] Independent     [ x   ] With device                            [   ]  CG                                                         [   ]  CG                                                                  [   ] CG                            [    ] Min A                                                   [   ] Min A                                                              [   ] Min  A          DISCHARGE PLAN:   [   ]  Good candidate for Intensive Rehabilitation/Hospital based-[facility]                                             Will tolerate 3hrs Intensive Rehab Daily                                       [  xx  ]  Short Term Rehab in Skilled Nursing Facility                        vs               [ x   ]  Home with Outpatient or  services                                         [    ]  Possible Candidate for Intensive Hospital based Rehab

## 2018-07-17 NOTE — CONSULT NOTE ADULT - ASSESSMENT
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 Jackson's office for jaundice, hypotension, and anemia.     # ELEVATED BILIRUBIN / DIRECT BILIRUBIN > INDIRECT / ELEVATED INR / LOW PLATELETS / ELEVATED LDH / NORMAL AST / ALT / NORMAL ALK PHOSPHATASE    - Differential could be ACUTE LIVER FAILURE sec to  Drug toxicity VIDAZA  vrs less likely Hemolysis or Obstruction as no CBD dilatation , no elevation of AST/ALT or ALK PO4.  - plz obtain records from Oncology as to when pt was given Vidaza for the last time. As bilirubin and INR are trending up since May , 2018.  - Pt reports getting 3 cycles of vidaza and is off chemo for 3 wks now ( plz verify with Onc team)  - plz get CT abd stat.  - Pt might need MRCP.  - monitor for signs of hepatic encephalopathy ; pt is A0 X 3 at this point but considering the possibility of Acute liver failure , pt might become encephalopathic.  - No signs of bleeding , RONA -josé luis .    # ANEMIA :    - s/p 2 units PRBC Hb 9 now.  - transfuse as needed to keep hb > 8.  - monitor for signs of bleeding as platelets very low ; platelet count 16 today.  - d/c DVT ppx.    # LEUKOCYTOSIS / HYPOTENSION / ELEVATED LACTIC ACID   - likely sepsis sec to unknown etiology.  - c/w abx and f/u with cultures. 77 y/o F with PMH of marginal zone lymphoma of face diagnosed 5 years ago , thrombocytopenia and recently diagnosed CML this past October presented from Dr. Jackson's office for jaundice, hypotension, and anemia.     # ELEVATED BILIRUBIN / DIRECT BILIRUBIN > INDIRECT / ELEVATED INR / LOW PLATELETS / ELEVATED LDH / NORMAL AST / ALT / NORMAL ALK PHOSPHATASE    - Differential could be ACUTE LIVER FAILURE sec to  Drug toxicity VIDAZA  vrs less likely Hemolysis or Obstruction as no CBD dilatation , no elevation of AST/ALT or ALK PO4.  - plz obtain records from Oncology as to when pt was given Vidaza for the last time. As bilirubin and INR are trending up since May , 2018.  - Pt reports getting 3 cycles of vidaza and is off chemo for 3 wks now ( plz verify with Onc team)  - plz get CT abd stat.  - Consider  MRCP after CT abd.  - Po vit K to reverse INR .  - monitor for signs of hepatic encephalopathy ; pt is A0 X 3 at this point but considering the possibility of Acute liver failure , pt might become encephalopathic.  - No signs of bleeding , RONA -josé luis .  -PT will need cld w/u including iron , ferritin , tibc , cerruloplasmin . hepatitis panel, anti sole and anti smooth muscle antibodies.    # ANEMIA :    - s/p 2 units PRBC Hb 9 now.  - transfuse as needed to keep hb > 8.  - monitor for signs of bleeding as platelets very low ; platelet count 16 today.  - d/c DVT ppx.    # LEUKOCYTOSIS / HYPOTENSION / ELEVATED LACTIC ACID   - likely sepsis sec to unknown etiology.  - c/w abx and f/u with cultures. 77 y/o F with PMH of marginal zone lymphoma of face diagnosed 5 years ago , thrombocytopenia and recently diagnosed CML this past October presented from Dr. Jackson's office for jaundice, hypotension, and anemia.     # ELEVATED BILIRUBIN / DIRECT BILIRUBIN > INDIRECT / ELEVATED INR / LOW PLATELETS / ELEVATED LDH / NORMAL AST / ALT / NORMAL ALK PHOSPHATASE    - Differential could be Liver injury sec to  Drug toxicity -VIDAZA  vs less likely Hemolysis or Obstruction as no CBD dilatation , no elevation of AST/ALT or ALK PO4.  - plz obtain records from Oncology as to when pt was given Vidaza for the last time. As bilirubin and INR are trending up since May , 2018.  - Pt reports getting 3 cycles of vidaza and is off chemo for 3 wks now ( plz verify with Onc team)  - plz get CT abd stat.  - Consider  MRCP after CT abd.  - Po vit K to reverse INR .  - monitor for signs of hepatic encephalopathy ; pt is A0 X 3 at this point but considering the possibility of Acute liver failure , pt might become encephalopathic.  - No signs of bleeding , RONA -josé luis .  -PT will need cld w/u including iron , ferritin , tibc , cerruloplasmin . hepatitis panel, anti sole and anti smooth muscle antibodies.    # ANEMIA :    - s/p 2 units PRBC Hb 9 now.  - transfuse as needed to keep hb > 8.  - monitor for signs of bleeding as platelets very low ; platelet count 16 today.  - d/c DVT ppx.    # LEUKOCYTOSIS / HYPOTENSION / ELEVATED LACTIC ACID   - likely sepsis sec to unknown etiology.  - c/w abx and f/u with cultures.

## 2018-07-17 NOTE — PROGRESS NOTE ADULT - ASSESSMENT
#Leukocytosis with Lactic Acidosis (4.2), possibly secondary toCMLvs sepsis secondary to unlikely source:  - lactate now 2.1 after IVF  -On parul and Vanco: to be d/c'ed if blood/urine cultures come back negative  - No blasts seen on CBC with diff, so not likely blast crisis  - Hem/Onc : holding chemo (VIDAZA) for now. F/U DIC panel,     # ELEVATED BILIRUBIN / DIRECT BILIRUBIN > INDIRECT / ELEVATED INR / LOW PLATELETS / ELEVATED LDH / NORMAL AST / ALT / NORMAL ALK PHOSPHATASE  - ACUTE LIVER FAILURE sec to  Drug toxicity VIDAZA  vrs less likely Hemolysis or Obstruction as no CBD dilatation , no elevation of AST/ALT or ALK PO4.  -CT abdo/pelvis : Mildly nodular hepatic contour,may be seen with cirrhosis; no focal   hepatic lesions or biliary ductal dilation.  -MRCP by GI?  -PO vit K given for INR reversaln  -Monitor for signs of hepatic encephalopathy  -F/U  iron , ferritin , tibc , cerruloplasmin . hepatitis panel, anti sole and anti smooth muscle antibodies.    # ANEMIA :    - s/p 2 units PRBC Hb 9 now, s/p one unit platelets today.  - transfuse as needed to keep hb > 8.  - monitor for signs of bleeding as platelets very low ; platelet count 16 today.  - d/c DVT ppx.    #Signs of hypervolemia, possibly 2/2 hypoalbuminemia:  - minimal left costophrenic blunting on CXR, 2+ pitting edema of LE, ascites  - monitor fluid status carefully while on IVF    #DVT ppx:  - Heparin SubQ    #Dispo:  - will get PT eval since patient has been increasingly weak and lives alone

## 2018-07-18 LAB
ALBUMIN SERPL ELPH-MCNC: 2 G/DL — LOW (ref 3.5–5.2)
ALBUMIN SERPL ELPH-MCNC: 2.2 G/DL — LOW (ref 3.5–5.2)
ALP SERPL-CCNC: 138 U/L — HIGH (ref 30–115)
ALP SERPL-CCNC: 160 U/L — HIGH (ref 30–115)
ALT FLD-CCNC: 29 U/L — SIGNIFICANT CHANGE UP (ref 0–41)
ALT FLD-CCNC: 30 U/L — SIGNIFICANT CHANGE UP (ref 0–41)
ANION GAP SERPL CALC-SCNC: 13 MMOL/L — SIGNIFICANT CHANGE UP (ref 7–14)
ANION GAP SERPL CALC-SCNC: 14 MMOL/L — SIGNIFICANT CHANGE UP (ref 7–14)
APTT BLD: 35.3 SEC — SIGNIFICANT CHANGE UP (ref 27–39.2)
AST SERPL-CCNC: 32 U/L — SIGNIFICANT CHANGE UP (ref 0–41)
AST SERPL-CCNC: 34 U/L — SIGNIFICANT CHANGE UP (ref 0–41)
BASOPHILS # BLD AUTO: 0 K/UL — SIGNIFICANT CHANGE UP (ref 0–0.2)
BASOPHILS NFR BLD AUTO: 0 % — SIGNIFICANT CHANGE UP (ref 0–1)
BILIRUB SERPL-MCNC: 7.4 MG/DL — HIGH (ref 0.2–1.2)
BILIRUB SERPL-MCNC: 7.7 MG/DL — HIGH (ref 0.2–1.2)
BUN SERPL-MCNC: 18 MG/DL — SIGNIFICANT CHANGE UP (ref 10–20)
BUN SERPL-MCNC: 20 MG/DL — SIGNIFICANT CHANGE UP (ref 10–20)
CALCIUM SERPL-MCNC: 6 MG/DL — LOW (ref 8.5–10.1)
CALCIUM SERPL-MCNC: 6.1 MG/DL — LOW (ref 8.5–10.1)
CHLORIDE SERPL-SCNC: 100 MMOL/L — SIGNIFICANT CHANGE UP (ref 98–110)
CHLORIDE SERPL-SCNC: 102 MMOL/L — SIGNIFICANT CHANGE UP (ref 98–110)
CO2 SERPL-SCNC: 23 MMOL/L — SIGNIFICANT CHANGE UP (ref 17–32)
CO2 SERPL-SCNC: 26 MMOL/L — SIGNIFICANT CHANGE UP (ref 17–32)
CREAT SERPL-MCNC: 0.8 MG/DL — SIGNIFICANT CHANGE UP (ref 0.7–1.5)
CREAT SERPL-MCNC: 0.9 MG/DL — SIGNIFICANT CHANGE UP (ref 0.7–1.5)
EOSINOPHIL # BLD AUTO: 0 K/UL — SIGNIFICANT CHANGE UP (ref 0–0.7)
EOSINOPHIL NFR BLD AUTO: 0 % — SIGNIFICANT CHANGE UP (ref 0–8)
FIBRINOGEN PPP-MCNC: 152 MG/DL — LOW (ref 204.4–570.6)
GLUCOSE SERPL-MCNC: 111 MG/DL — HIGH (ref 70–99)
GLUCOSE SERPL-MCNC: 132 MG/DL — HIGH (ref 70–99)
HCT VFR BLD CALC: 22.5 % — LOW (ref 37–47)
HCT VFR BLD CALC: 22.6 % — LOW (ref 37–47)
HGB BLD-MCNC: 8.3 G/DL — LOW (ref 12–16)
HGB BLD-MCNC: 8.3 G/DL — LOW (ref 12–16)
INR BLD: 2.23 RATIO — HIGH (ref 0.65–1.3)
LYMPHOCYTES # BLD AUTO: 22 % — SIGNIFICANT CHANGE UP (ref 20.5–51.1)
LYMPHOCYTES # BLD AUTO: 4.55 K/UL — HIGH (ref 1.2–3.4)
MAGNESIUM SERPL-MCNC: 2.1 MG/DL — SIGNIFICANT CHANGE UP (ref 1.8–2.4)
MCHC RBC-ENTMCNC: 27.9 PG — SIGNIFICANT CHANGE UP (ref 27–31)
MCHC RBC-ENTMCNC: 28 PG — SIGNIFICANT CHANGE UP (ref 27–31)
MCHC RBC-ENTMCNC: 36.7 G/DL — SIGNIFICANT CHANGE UP (ref 32–37)
MCHC RBC-ENTMCNC: 36.9 G/DL — SIGNIFICANT CHANGE UP (ref 32–37)
MCV RBC AUTO: 75.8 FL — LOW (ref 81–99)
MCV RBC AUTO: 76.4 FL — LOW (ref 81–99)
MONOCYTES # BLD AUTO: 0.62 K/UL — HIGH (ref 0.1–0.6)
MONOCYTES NFR BLD AUTO: 3 % — SIGNIFICANT CHANGE UP (ref 1.7–9.3)
NEUTROPHILS # BLD AUTO: 7.23 K/UL — HIGH (ref 1.4–6.5)
NEUTROPHILS NFR BLD AUTO: 25 % — LOW (ref 42.2–75.2)
NRBC # BLD: 5 /100 WBCS — HIGH (ref 0–0)
PLATELET # BLD AUTO: 34 K/UL — LOW (ref 130–400)
PLATELET # BLD AUTO: 35 K/UL — LOW (ref 130–400)
POTASSIUM SERPL-MCNC: 2.8 MMOL/L — LOW (ref 3.5–5)
POTASSIUM SERPL-MCNC: 3.3 MMOL/L — LOW (ref 3.5–5)
POTASSIUM SERPL-SCNC: 2.8 MMOL/L — LOW (ref 3.5–5)
POTASSIUM SERPL-SCNC: 3.3 MMOL/L — LOW (ref 3.5–5)
PROT SERPL-MCNC: 3.4 G/DL — LOW (ref 6–8)
PROT SERPL-MCNC: 3.5 G/DL — LOW (ref 6–8)
PROTHROM AB SERPL-ACNC: 23.9 SEC — HIGH (ref 9.95–12.87)
RBC # BLD: 2.96 M/UL — LOW (ref 4.2–5.4)
RBC # BLD: 2.97 M/UL — LOW (ref 4.2–5.4)
RBC # FLD: 21.2 % — HIGH (ref 11.5–14.5)
RBC # FLD: 21.9 % — HIGH (ref 11.5–14.5)
SODIUM SERPL-SCNC: 137 MMOL/L — SIGNIFICANT CHANGE UP (ref 135–146)
SODIUM SERPL-SCNC: 141 MMOL/L — SIGNIFICANT CHANGE UP (ref 135–146)
WBC # BLD: 20.67 K/UL — HIGH (ref 4.8–10.8)
WBC # BLD: 20.77 K/UL — HIGH (ref 4.8–10.8)
WBC # FLD AUTO: 20.67 K/UL — HIGH (ref 4.8–10.8)
WBC # FLD AUTO: 20.77 K/UL — HIGH (ref 4.8–10.8)

## 2018-07-18 RX ORDER — POTASSIUM CHLORIDE 20 MEQ
20 PACKET (EA) ORAL
Qty: 0 | Refills: 0 | Status: DISCONTINUED | OUTPATIENT
Start: 2018-07-18 | End: 2018-07-18

## 2018-07-18 RX ORDER — POTASSIUM CHLORIDE 20 MEQ
40 PACKET (EA) ORAL ONCE
Qty: 0 | Refills: 0 | Status: DISCONTINUED | OUTPATIENT
Start: 2018-07-18 | End: 2018-07-18

## 2018-07-18 RX ORDER — POTASSIUM CHLORIDE 20 MEQ
20 PACKET (EA) ORAL
Qty: 0 | Refills: 0 | Status: COMPLETED | OUTPATIENT
Start: 2018-07-18 | End: 2018-07-18

## 2018-07-18 RX ORDER — PHYTONADIONE (VIT K1) 5 MG
5 TABLET ORAL ONCE
Qty: 0 | Refills: 0 | Status: COMPLETED | OUTPATIENT
Start: 2018-07-18 | End: 2018-07-18

## 2018-07-18 RX ADMIN — Medication 50 MILLIEQUIVALENT(S): at 13:55

## 2018-07-18 RX ADMIN — Medication 250 MILLIGRAM(S): at 22:01

## 2018-07-18 RX ADMIN — SODIUM CHLORIDE 75 MILLILITER(S): 9 INJECTION INTRAMUSCULAR; INTRAVENOUS; SUBCUTANEOUS at 06:23

## 2018-07-18 RX ADMIN — SODIUM CHLORIDE 75 MILLILITER(S): 9 INJECTION INTRAMUSCULAR; INTRAVENOUS; SUBCUTANEOUS at 12:08

## 2018-07-18 RX ADMIN — Medication 5 MILLIGRAM(S): at 12:07

## 2018-07-18 RX ADMIN — MEROPENEM 100 MILLIGRAM(S): 1 INJECTION INTRAVENOUS at 05:15

## 2018-07-18 RX ADMIN — SODIUM CHLORIDE 75 MILLILITER(S): 9 INJECTION INTRAMUSCULAR; INTRAVENOUS; SUBCUTANEOUS at 21:52

## 2018-07-18 RX ADMIN — Medication 50 MILLIEQUIVALENT(S): at 16:21

## 2018-07-18 RX ADMIN — Medication 50 MILLIEQUIVALENT(S): at 11:35

## 2018-07-18 RX ADMIN — MEROPENEM 100 MILLIGRAM(S): 1 INJECTION INTRAVENOUS at 17:13

## 2018-07-18 NOTE — PROGRESS NOTE ADULT - SUBJECTIVE AND OBJECTIVE BOX
GI HPI Today:  Patient feels better today, denies any complaints. No abdominal pain, vomiting. 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 Jackson's office for jaundice, hypotension, and anemia. Patient did not notice that she was jaundiced, but she did feel extremely weak. Patient lives at home alone and is able to independently perform her ADLs. She knows that when she feels this weak, she likely needs blood transfusion. However, during her last appt, she was not transfused because she was told her hemoglobin was not that low. Patient denied fever, chills, headache, dizziness, chest pain, SOB, n/v/d, or dysuria. Patient also reported that her legs were more swollen than usual because she has been walking less for the past 2 weeks due to weakness. Vitals in ED were T 97.3F, BP 80/44, HR 80, RR 16, SpO2 98% on room air. She was also found to have anemia, leukocytosis with a left shift and monocytosis, hypokalemia, hypomagnasemia, hyperbilirubinemia, elevated INR, lactic acidosis, and ascites and hepatosplenomegaly on RUQ US from 7/10. In ED, she received 2U PRBC, 250cc NS bolus with maintenance NS @ 75 cc/hr, 2gm Mag rider, and 80meq of potassium.    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 At that time noted to have thrombocytopenia, s/p Bone marrow biopsy for suspected bone marrow involvement. It was hypercellular, however flow from bone marrow was negative.  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 Jackson said it wasn't effective and she may need alternative therapy. (16 Jul 2018 20:41)      PAST MEDICAL & SURGICAL HISTORY  Thrombocytopenia  Chronic myelomonocytic leukemia not having achieved remission  Non-Hodgkins lymphoma  S/P total abdominal hysterectomy      ALLERGIES:  penicillin (Unknown)      MEDICATIONS:  MEDICATIONS  (STANDING):  meropenem  IVPB      meropenem  IVPB 1000 milliGRAM(s) IV Intermittent every 12 hours  potassium chloride  20 mEq/100 mL IVPB 20 milliEquivalent(s) IV Intermittent every 2 hours  sodium chloride 0.9%. 1000 milliLiter(s) (75 mL/Hr) IV Continuous <Continuous>  vancomycin  IVPB      vancomycin  IVPB 1000 milliGRAM(s) IV Intermittent every 24 hours    MEDICATIONS  (PRN):      REVIEW OF SYSTEMS:    CONSTITUTIONAL: weakness, fatigue, no fevers or chills,   Throat: No Dysphagia, No Odynophagia  RESPIRATORY: No SOB  CARDIOVASCULAR: No chest pain   Muscloskeletal: no joints pain  NEUROLOGICAL: No syncope or diziness  SKIN: No pruritis  Heme/Lymph: no LN enlargement         VITALS:   T(F): 97.1 (07-18 @ 05:39), Max: 97.7 (07-16 @ 20:05)  HR: 75 (07-18 @ 05:39) (75 - 80)  BP: 95/48 (07-18 @ 05:39) (73/43 - 109/59)  BP(mean): --  RR: 18 (07-18 @ 05:39) (16 - 20)  SpO2: 95% (07-18 @ 07:17) (94% - 98%)        PHYSICAL EXAM:  ANO*3  EYES: scleral icterus   LUNG: Clear to auscultation bilaterally; No rales, rhonchi, wheezing, or rubs  HEART: RRR; No murmurs  ABDOMEN: Soft, +BS, no Abdominal Tenderness, No guarding, No Lanza Sign   Rectal Exam: not perfomed     Blood Work :                        8.7    23.49 )-----------( 62       ( 17 Jul 2018 17:49 )             23.6     PT/INR - ( 17 Jul 2018 08:02 )  INR: 3.14          PTT - ( 17 Jul 2018 08:02 )  PTT:42.8<H>  07-17    138  |  98  |  24<H>  ----------------------------<  88  2.7<LL>   |  24  |  1.1    Ca    6.0<L>      17 Jul 2018 08:02  Mg     1.7     07-17      CBC -  ( 17 Jul 2018 17:49 )  Hemoglobin : 8.7      CBC -  ( 17 Jul 2018 08:02 )  Hemoglobin : 9.1    CBC -  ( 16 Jul 2018 17:05 )  Hemoglobin : 7.5      LIVER FUNCTIONS - ( 17 Jul 2018 08:02 )  Alb: 2.1 [3.5 - 5.2] / Pro: 3.5 [6.0 - 8.0] / ALK PHOS: 82 [30 - 115] / ALT: 31 [0 - 41] / AST: 35 [0 - 41] / GGT: x     LIVER FUNCTIONS - ( 16 Jul 2018 17:05 )  Alb: 2.4 [3.5 - 5.2] / Pro: 4.1 [6.0 - 8.0] / ALK PHOS: 94 [30 - 115] / ALT: 32 [0 - 41] / AST: 28 [0 - 41] / GGT: x         RADIOLOGY:    < from: CT Abdomen and Pelvis w/ IV Cont (07.17.18 @ 13:07) >  INDINGS:    LOWER CHEST: Small bilateral pleural effusions and atelectasis, new.    HEPATOBILIARY: No focal hepatic lesions. No biliary ductal dilation.   Mildly nodular hepatic contour.    SPLEEN: Enlarged spleen measuring up to 15 cm in craniocaudal dimension,   previously 18 cm. Persistent marked heterogeneous enhancement of the   parenchyma noted.    PANCREAS: Unremarkable.    ADRENAL GLANDS: Unremarkable.    KIDNEYS: Symmetric enhancement bilaterally. No hydronephrosis.    ABDOMINOPELVICNODES: No lymphadenopathy.    PELVIC ORGANS: Post hysterectomy.    PERITONEUM/MESENTERY/BOWEL: Moderate volume abdominopelvic ascites. No   bowel obstruction or pneumoperitoneum.    BONES/SOFT TISSUES: Degenerative changes of the thoracolumbar spine noted.    IMPRESSION:   1.  Since May 27, 2018, new moderate volume abdominopelvic ascites.    2.  Slightly improved splenomegaly now measuring 15 cm, previously 18 cm   with persistent heterogeneous enhancement.    3.  Mildly nodular hepatic contour,may be seen with cirrhosis; no focal   hepatic lesions or biliary ductal dilation.        < end of copied text >

## 2018-07-18 NOTE — PHYSICAL THERAPY INITIAL EVALUATION ADULT - IMPAIRMENTS FOUND, PT EVAL
muscle strength/ergonomics and body mechanics/aerobic capacity/endurance/posture/gait, locomotion, and balance

## 2018-07-18 NOTE — PHYSICAL THERAPY INITIAL EVALUATION ADULT - GAIT DEVIATIONS NOTED, PT EVAL
decreased weight-shifting ability/decreased adwoa/decreased step length/Decreased speed, NBOS, fatigues quickly/decreased stride length

## 2018-07-18 NOTE — PROGRESS NOTE ADULT - ASSESSMENT
77 y/o F with PMH of marginal zone lymphoma of face diagnosed 5 years ago , thrombocytopenia and recently diagnosed CML this past October presented from Dr. Jackson's office for jaundice, hypotension, and anemia.     CML w/ persistent leukocytosis: on vidaza, last cycle June 4th.  -Vidaza stopped; no plan to restart it by Dr. Jackson as pt is not responding  -Will consider repeat bone marrow to rule out leukemic transformation or Palliative in light of the advanced disease with limited treatment options  - Keep hb >7 and PLt >20526O. keep active Type&screen  -DIC panel: fibrinogen 152, D-Dimer 3202. Heparin stopped, no active bleeding--> no platelet transfusion at this time. Plt 62,000 s/p 1 unit yesterday  	-Transfuse only if bleeding or plt<20. Contact Hem/Onc first  	-FFP if procedure is required. paracentesis?    -Anemia  Hemolysis workup  -No schistocytes     # ELEVATED BILIRUBIN / DIRECT BILIRUBIN > INDIRECT / ELEVATED INR / LOW PLATELETS / ELEVATED LDH / NORMAL AST / ALT / NORMAL ALK PHOSPHATASE  - Liver injury secondary to Drug toxicity - Vidaza, vs Chronic Liver disease vs less likely obstruction as no CBD dilatation, no AST/ALT/ALP elevation  -CT abdo w/IV (07/17): No evidence of obstruction. nodular liver contour and moderate ascites   -INR 3.86 --> now 2.23. pt given 5mg vit k Po yesterday. Another 5mg PO today.  - Once INR/Platelet count normalize--> plan for paracentesis and possible Liver Bx in the future. If Liver failure is the pathology, INR less likely to respond to vit k.  -Plan for MRCP  -hep panel -ve      -Sepsis  -lactiac acid trending down  -F/U blood Cx. D/C Abx if negative      #Signs of hypervolemia, possibly 2/2 hypoalbuminemia:  - minimal left costophrenic blunting on CXR, 2+ pitting edema of LE, ascites  - monitor fluid status carefully while on IVF    #DVT ppx:  - Heparin SubQ    #Dispo:  - will get PT eval since patient has been increasingly weak and lives alone 79 y/o F with PMH of marginal zone lymphoma of face diagnosed 5 years ago , thrombocytopenia and recently diagnosed CML this past October presented from Dr. Jackson's office for jaundice, hypotension, and anemia.     # ELEVATED BILIRUBIN / DIRECT BILIRUBIN > INDIRECT / ELEVATED INR / LOW PLATELETS / ELEVATED LDH / NORMAL AST / ALT / NORMAL ALK PHOSPHATASE  - Liver injury secondary to Drug toxicity - Vidaza, vs Chronic Liver disease vs less likely obstruction as no CBD dilatation, no AST/ALT/ALP elevation  -CT abdo w/IV (07/17): No evidence of obstruction. nodular liver contour and moderate ascites   -F/U MRCP results  -INR 3.86 --> now 2.23. pt given 5mg vit k Po yesterday. Another 5mg PO today.  - Once INR/Platelet count normalize--> plan for paracentesis and possible Liver Bx in the future. If Liver failure is the pathology, INR less likely to respond to vit k.  -hep panel -ve  -F/U COREY, SM Ab    CML w/ persistent leukocytosis: on vidaza, last cycle June 4th.  -WBC trending down  -Vidaza stopped; no plan to restart it by Dr. Jackson as pt is not responding  -Will consider repeat bone marrow to rule out leukemic transformation or Palliative in light of the advanced disease with limited treatment options  - Keep hb >7 and PLt >77835F. keep active Type&screen  -DIC panel: fibrinogen 152, D-Dimer 3202. Heparin stopped, no active bleeding--> no platelet transfusion at this time. Plt 62,000 s/p 1 unit yesterday  	-Transfuse only if bleeding or plt<20. Contact Hem/Onc first  	-FFP if procedure is required. paracentesis?  -F/U Factor V, VIII    -Anemia  Hemolysis workup: LDH 1153 Ferritin 8729 TIBC <107  -No schistocytes         -Sepsis  -lactiac acid trending down  -F/U blood Cx. D/C Abx if negative      #Signs of hypervolemia, possibly 2/2 hypoalbuminemia:  - minimal left costophrenic blunting on CXR, 2+ pitting edema of LE, ascites  - monitor fluid status carefully while on IVF    #DVT ppx:  - Heparin SubQ    #Dispo:  - will get PT eval since patient has been increasingly weak and lives alone

## 2018-07-18 NOTE — PHYSICAL THERAPY INITIAL EVALUATION ADULT - PERTINENT HX OF CURRENT PROBLEM, REHAB EVAL
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 Jackson's office for jaundice, hypotension, and anemia.

## 2018-07-18 NOTE — PROGRESS NOTE ADULT - ASSESSMENT
79 y/o F with CMML p/w hypotension, worsening fatigue and  jaundice, and anemia    1. Jaundice with elevated direct bilirubin , normal liver enzymes and Alk phos, prolonged INR/PT  - No evidence of obstruction on CT abdomen and US abdomen : Nodular contour of liver suggestive of liver disease . + moderate ascites  - DIC panel showed a fibrinogen level of 150, elevated D dimer . Will consider cryo if fibrinogen <150  - Peripheral smear did not show any schistocytes - will check haptoglobin, jasiel  - hepatitis panel negative  - Could be drug related cholestasis vs leukemic infiltration   - Follow up with GI ( Possible tap?)  - Follow up sepsis workup - and c/w antibiotics      2. Elevated INR: s/p 1 dose of vitamin K po  - INR went down from 3.34 to 2.23 - if liver failure , less likely to respond . Will follow INR tomorrow , to check if it is downtrending after vitamin K      3. CMML with persistent leucocytosis: On vidaza. last dose ( last cycle June 4)  - Will consider repeat bone marrow to rule out leukemic transformation    or Palliative in light of the advanced disease with limited treatment options  - Keep hb >7 and PLt >11972V    4. C/W meds for other co-morbidities.    5. DVT/GI ppx: Sequentials for now    Plan : Will discuss with attending

## 2018-07-18 NOTE — PROGRESS NOTE ADULT - ASSESSMENT
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 Jackson's office for jaundice, hypotension, and anemia.     # ELEVATED BILIRUBIN / DIRECT BILIRUBIN > INDIRECT / ELEVATED INR / LOW PLATELETS / ELEVATED LDH / NORMAL AST / ALT / NORMAL ALK PHOSPHATASE    - Differential could be Liver injury sec to  Drug toxicity -VIDAZA  vs CLD VS  vanishing duct sd vs less likely Hemolysis or Obstruction as no CBD dilatation , no elevation of AST/ALT or ALK PO4.  - Patient was on rituximab 1 year ago pt was given Vidaza for the last time 2 weeks ago . As bilirubin and INR are trending up since May , 2018.  - CT abdomen showed nodular liver contour and moderate ascites so please do abdominal Tap with albumin, protein cell count  cytology gram stain culture ldh and glucose   -please follow CLD workup   - MRCP pending   - Po vit K to reverse INR   - monitor for signs of hepatic encephalopathy .    # ANEMIA :    - follow Hb, no signs of active bleeding   - transfuse as needed to keep hb > 7.  - monitor for signs of bleeding as platelets very low ; platelet count 16 today.  - d/c DVT ppx.  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 Jackson's office for jaundice, hypotension, and anemia.     # ELEVATED BILIRUBIN / DIRECT BILIRUBIN > INDIRECT / ELEVATED INR / LOW PLATELETS / ELEVATED LDH / NORMAL AST / ALT / NORMAL ALK PHOSPHATASE    - Differential could be Liver injury sec to  Drug toxicity -VIDAZA  vs CLD VS  vanishing duct sd vs less likely Hemolysis or Obstruction as no CBD dilatation , no elevation of AST/ALT or ALK PO4.  - Patient was on rituximab 1 year ago pt was given Vidaza for the last time 2 weeks ago . As bilirubin and INR are trending up since May , 2018.  - CT abdomen showed nodular liver contour and moderate ascites   When INR and platelet improve- consider abdominal tapand liver biopsy in the future   follow CLD workup   - MRCP pending   - Po vit K to reverse INR   - monitor for signs of hepatic encephalopathy .    # ANEMIA :    - follow Hb, no signs of active bleeding   - transfuse as needed to keep hb > 7.  - monitor for signs of bleeding as platelets very low ; platelet count 16 today.  - d/c DVT ppx.  will discuss with attending

## 2018-07-18 NOTE — PROGRESS NOTE ADULT - SUBJECTIVE AND OBJECTIVE BOX
Patient is a 78y old  Female who presents with a chief complaint of weakness (16 Jul 2018 23:57)      Subjective: Patient is feeling better . She had urinary retention overnight and a bardales was placed. She is ambulating      Vital Signs Last 24 Hrs  T(C): 36.2 (18 Jul 2018 05:39), Max: 36.2 (17 Jul 2018 13:55)  T(F): 97.1 (18 Jul 2018 05:39), Max: 97.2 (17 Jul 2018 13:55)  HR: 75 (18 Jul 2018 05:39) (75 - 80)  BP: 95/48 (18 Jul 2018 05:39) (95/48 - 100/47)  BP(mean): --  RR: 18 (18 Jul 2018 05:39) (17 - 18)  SpO2: 95% (18 Jul 2018 07:17) (94% - 95%)    PHYSICAL EXAM  General: weak, cachectic  HEENT: clear oropharynx, icteric sclera, pink conjunctiva  Neck: supple  CV: normal S1/S2 with no murmur rubs or gallops  Lungs: decreased air entry bilaterally  Abdomen: soft non-tender non-distended, spleen could not be palpated  Ext: no clubbing cyanosis or edema  Skin: no rashes and no petechiae  Neuro: alert and oriented X 4, no focal deficits    MEDICATIONS  (STANDING):  meropenem  IVPB      meropenem  IVPB 1000 milliGRAM(s) IV Intermittent every 12 hours  potassium chloride  20 mEq/100 mL IVPB 20 milliEquivalent(s) IV Intermittent every 2 hours  sodium chloride 0.9%. 1000 milliLiter(s) (75 mL/Hr) IV Continuous <Continuous>  vancomycin  IVPB      vancomycin  IVPB 1000 milliGRAM(s) IV Intermittent every 24 hours    MEDICATIONS  (PRN):      LABS:                          8.3    20.67 )-----------( 34       ( 18 Jul 2018 07:08 )             22.5         Mean Cell Volume : 75.8 fL  Mean Cell Hemoglobin : 27.9 pg  Mean Cell Hemoglobin Concentration : 36.9 g/dL  Auto Neutrophil # : 7.23 K/uL  Auto Lymphocyte # : 4.55 K/uL  Auto Monocyte # : 0.62 K/uL  Auto Eosinophil # : 0.00 K/uL  Auto Basophil # : 0.00 K/uL  Auto Neutrophil % : 25.0 %  Auto Lymphocyte % : 22.0 %  Auto Monocyte % : 3.0 %  Auto Eosinophil % : 0.0 %  Auto Basophil % : 0.0 %      Serial CBC's  07-18 @ 07:08  Hct-22.5 / Hgb-8.3 / Plat-34 / RBC-2.97 / WBC-20.67  Serial CBC's  07-17 @ 17:49  Hct-23.6 / Hgb-8.7 / Plat-62 / RBC-3.12 / WBC-23.49  Serial CBC's  07-17 @ 08:02  Hct-24.7 / Hgb-9.1 / Plat-16 / RBC-3.30 / WBC-25.33  Serial CBC's  07-16 @ 17:05  Hct-20.6 / Hgb-7.5 / Plat-30 / RBC-2.71 / WBC-37.94      07-17    138  |  98  |  24<H>  ----------------------------<  88  2.7<LL>   |  24  |  1.1    Ca    6.0<L>      17 Jul 2018 08:02  Mg     1.7     07-17    TPro  3.5<L>  /  Alb  2.1<L>  /  TBili  7.7<H>  /  DBili  6.3<H>  /  AST  35  /  ALT  31  /  AlkPhos  82  07-17      PT/INR - ( 18 Jul 2018 07:08 )   PT: 23.90 sec;   INR: 2.23 ratio         PTT - ( 18 Jul 2018 07:08 )  PTT:35.3 sec    Iron - Total Binding Capacity.: <106 ug/dL (07-17 @ 08:02)  Ferritin, Serum: 8729 ng/mL (07-17 @ 08:02)      RADIOLOGY & ADDITIONAL STUDIES:  < from: CT Abdomen and Pelvis w/ IV Cont (07.17.18 @ 13:07) >    IMPRESSION:   1.  Since May 27, 2018, new moderate volume abdominopelvic ascites.    2.  Slightly improved splenomegaly now measuring 15 cm, previously 18 cm   with persistent heterogeneous enhancement.    3.  Mildly nodular hepatic contour,may be seen with cirrhosis; no focal   hepatic lesions or biliary ductal dilation.    < end of copied text >  < from: US Abdomen Limited (07.17.18 @ 10:10) >  IMPRESSION:    Liver is mildly lobulated in contour and heterogeneous in echotexture   consistent with liver disease.    No evidence of biliary duct dilatation.    Contracted gallbladder with stones.    Small to moderate right pleural effusion with atelectasis.    Increased ascites, now mild to moderate and previously mild      < end of copied text >    < from: Xray Chest 1 View- PORTABLE-Urgent (07.16.18 @ 17:53) >    Impression:      Small left pleural effusion.    Question right upper lobe atelectasis.      < end of copied text >

## 2018-07-18 NOTE — PROGRESS NOTE ADULT - SUBJECTIVE AND OBJECTIVE BOX
GALA PHILLIPS, female, 78y ([birth year]),   [id number]  Admit Date: 07-16-18 (2d)      Chief Complaint:  Patient is a 78y old  Female who presents with a chief complaint of weakness (16 Jul 2018 23:57)    Hospital course:  77 y/o F with PMH of NHL diagnosed 5 years ago and recently diagnosed CML this past October presented from Dr. Jackson's office for jaundice, hypotension, and anemia. Patient did not notice that she was jaundiced, but she did feel extremely weak. Patient lives at home alone and is able to independently perform her ADLs. She knows that when she feels this weak, she likely needs blood transfusion. However, during her last appt, she was not transfused because she was told her hemoglobin was not that low. Patient denied fever, chills, headache, dizziness, chest pain, SOB, n/v/d, or dysuria. Patient also reported that her legs were more swollen than usual because she has been walking less for the past 2 weeks due to weakness. Vitals in ED were T 97.3F, BP 80/44, HR 80, RR 16, SpO2 98% on room air. She was also found to have anemia, leukocytosis with a left shift and monocytosis, hypokalemia, hypomagnasemia, hyperbilirubinemia, elevated INR, lactic acidosis, and ascites and hepatosplenomegaly on RUQ US from 7/10. In ED, she received 2U PRBC, 250cc NS bolus with maintenance NS @ 75 cc/hr, 2gm Mag rider, and 80meq of potassium.    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 At that time noted to have thrombocytopenia, s/p Bone marrow biopsy for suspected bone marrow involvement. It was hypercellular, however flow from bone marrow was negative.  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 Jackson said it wasn't effective and she may need alternative therapy. (16 Jul 2018 20:41)    Interval History:  No acute events overnight. No complaints at this time.    Past Medical and Surgical History:  PAST MEDICAL & SURGICAL HISTORY:  Thrombocytopenia  Chronic myelomonocytic leukemia not having achieved remission  Non-Hodgkins lymphoma  S/P total abdominal hysterectomy      Current Medications:  MEDICATIONS  (STANDING):  meropenem  IVPB      meropenem  IVPB 1000 milliGRAM(s) IV Intermittent every 12 hours  potassium chloride  20 mEq/100 mL IVPB 20 milliEquivalent(s) IV Intermittent every 2 hours  potassium chloride  20 mEq/100 mL IVPB 20 milliEquivalent(s) IV Intermittent every 2 hours  sodium chloride 0.9%. 1000 milliLiter(s) (75 mL/Hr) IV Continuous <Continuous>  vancomycin  IVPB      vancomycin  IVPB 1000 milliGRAM(s) IV Intermittent every 24 hours    MEDICATIONS  (PRN):        Vital Signs:  T(F): 97.1 (07-18-18 @ 05:39), Max: 97.7 (07-16-18 @ 20:05)  HR: 75 (07-18-18 @ 05:39) (75 - 80)  BP: 95/48 (07-18-18 @ 05:39) (73/43 - 109/59)  RR: 18 (07-18-18 @ 05:39) (16 - 20)  SpO2: 95% (07-18-18 @ 07:17) (94% - 98%)  CAPILLARY BLOOD GLUCOSE          Physical Exam:  General: Not in distress.   HEENT: Moist mucus membranes. Scleral icterus  Cardio: Regular rate and rhythm, S1, S2, no murmur, rub, or gallop.  Pulm: Clear to auscultation bilaterally. No wheezing, rales, or rhonchi.  Abdomen: Soft, non-tender, +BS. No guarding. mild-moderate distension seconadry to ascites  Extremities: No cyanosis or edema bilaterally. No calf tenderness to palpation.  Neuro: A&O x3. No focal deficits. CN II - XII grossly intact.    Labs and Imaging:  CBC Full  -  ( 18 Jul 2018 07:08 )  WBC Count : 20.67 K/uL  Hemoglobin : 8.3 g/dL  Hematocrit : 22.5 %  Platelet Count - Automated : 34 K/uL  Mean Cell Volume : 75.8 fL  Mean Cell Hemoglobin : 27.9 pg  Mean Cell Hemoglobin Concentration : 36.9 g/dL  Auto Neutrophil # : 7.23 K/uL  Auto Lymphocyte # : 4.55 K/uL  Auto Monocyte # : 0.62 K/uL  Auto Eosinophil # : 0.00 K/uL  Auto Basophil # : 0.00 K/uL  Auto Neutrophil % : 25.0 %  Auto Lymphocyte % : 22.0 %  Auto Monocyte % : 3.0 %  Auto Eosinophil % : 0.0 %  Auto Basophil % : 0.0 %    RDW: 21.2  21.1    PT/INR/PTT: 07-18-18 @ 07:08  23.90 | 35.3        ^      2.23  PT/INR/PTT: 07-17-18 @ 08:02  33.50 | 42.8        ^      3.14  PT/INR/PTT: 07-16-18 @ 17:05  >40.00 | 36.2        ^      3.86    BMP: 07-18-18 @ 07:08  141 | 102 | 20   -----------------< 111  2.8  | 26 | 0.9  eGFR(AA): 72, eGFR (non-AA): 62  Ca 6.0, Mg 2.1, P --    LFTs: 07-18-18 @ 07:08  TP  3.4  | 2.2 Alb   ---------------  TB  7.4  | 5.9 DB   ---------------  ALT 30  | 34  AST            ^          138 ALK  LFTs: 07-17-18 @ 08:02  TP  3.5  | 2.1 Alb   ---------------  TB  7.7  | 6.3 DB   ---------------  ALT 31  | 35  AST            ^          82  ALK  LFTs: 07-16-18 @ 17:05  TP  4.1  | 2.4 Alb   ---------------  TB  8.6  | 6.6 DB   ---------------  ALT 32  | 28  AST            ^          94  ALK      LFTs: 07-18-18 @ 07:08  Ca  6.0  | 34 AST   -----------------  TP  3.4  | 30 ALT  -----------------  Alb 2.2  | 138 ALK          ^        7.4       CT ABdo/Pelvis w/ IV contrast 07/17    IMPRESSION:   1.  Since May 27, 2018, new moderate volume abdominopelvic ascites.    2.  Slightly improved splenomegaly now measuring 15 cm, previously 18 cm   with persistent heterogeneous enhancement.    3.  Mildly nodular hepatic contour,may be seen with cirrhosis; no focal   hepatic lesions or biliary ductal dilation.    < from: Xray Chest 1 View- PORTABLE-Urgent (07.16.18 @ 17:53) >    Impression:      Small left pleural effusion.    Question right upper lobe atelectasis.      < end of copied text >

## 2018-07-18 NOTE — PHYSICAL THERAPY INITIAL EVALUATION ADULT - GENERAL OBSERVATIONS, REHAB EVAL
1433 - [id number] rec semifowler in bed with +bardales,+IVs, +bed alarm, in NAD. Pt agreeable to b/s PT.

## 2018-07-19 ENCOUNTER — APPOINTMENT (OUTPATIENT)
Dept: HEMATOLOGY ONCOLOGY | Facility: CLINIC | Age: 78
End: 2018-07-19

## 2018-07-19 LAB
ALBUMIN SERPL ELPH-MCNC: 2 G/DL — LOW (ref 3.5–5.2)
ALP SERPL-CCNC: 176 U/L — HIGH (ref 30–115)
ALT FLD-CCNC: 30 U/L — SIGNIFICANT CHANGE UP (ref 0–41)
ANION GAP SERPL CALC-SCNC: 12 MMOL/L — SIGNIFICANT CHANGE UP (ref 7–14)
APTT BLD: 35.1 SEC — SIGNIFICANT CHANGE UP (ref 27–39.2)
AST SERPL-CCNC: 31 U/L — SIGNIFICANT CHANGE UP (ref 0–41)
BASOPHILS # BLD AUTO: 0.03 K/UL — SIGNIFICANT CHANGE UP (ref 0–0.2)
BASOPHILS NFR BLD AUTO: 0.2 % — SIGNIFICANT CHANGE UP (ref 0–1)
BILIRUB DIRECT SERPL-MCNC: 5.9 MG/DL — HIGH (ref 0–0.2)
BILIRUB INDIRECT FLD-MCNC: 1.7 MG/DL — HIGH (ref 0.2–1.2)
BILIRUB SERPL-MCNC: 7.6 MG/DL — HIGH (ref 0.2–1.2)
BLD GP AB SCN SERPL QL: SIGNIFICANT CHANGE UP
BUN SERPL-MCNC: 16 MG/DL — SIGNIFICANT CHANGE UP (ref 10–20)
CALCIUM SERPL-MCNC: 6 MG/DL — LOW (ref 8.5–10.1)
CERULOPLASMIN SERPL-MCNC: 34 MG/DL — SIGNIFICANT CHANGE UP (ref 20–60)
CHLORIDE SERPL-SCNC: 99 MMOL/L — SIGNIFICANT CHANGE UP (ref 98–110)
CO2 SERPL-SCNC: 26 MMOL/L — SIGNIFICANT CHANGE UP (ref 17–32)
CREAT SERPL-MCNC: 0.8 MG/DL — SIGNIFICANT CHANGE UP (ref 0.7–1.5)
CULTURE RESULTS: NO GROWTH — SIGNIFICANT CHANGE UP
EOSINOPHIL # BLD AUTO: 0.03 K/UL — SIGNIFICANT CHANGE UP (ref 0–0.7)
EOSINOPHIL NFR BLD AUTO: 0.2 % — SIGNIFICANT CHANGE UP (ref 0–8)
FIBRINOGEN PPP-MCNC: 125 MG/DL — LOW (ref 204.4–570.6)
FSP PPP-MCNC: >=20 UG/ML
GLUCOSE SERPL-MCNC: 105 MG/DL — HIGH (ref 70–99)
HCT VFR BLD CALC: 22 % — LOW (ref 37–47)
HGB BLD-MCNC: 8.1 G/DL — LOW (ref 12–16)
IMM GRANULOCYTES NFR BLD AUTO: 26.9 % — HIGH (ref 0.1–0.3)
INR BLD: 1.83 RATIO — HIGH (ref 0.65–1.3)
LYMPHOCYTES # BLD AUTO: 19.4 % — LOW (ref 20.5–51.1)
LYMPHOCYTES # BLD AUTO: 3.7 K/UL — HIGH (ref 1.2–3.4)
MAGNESIUM SERPL-MCNC: 1.7 MG/DL — LOW (ref 1.8–2.4)
MCHC RBC-ENTMCNC: 28 PG — SIGNIFICANT CHANGE UP (ref 27–31)
MCHC RBC-ENTMCNC: 36.8 G/DL — SIGNIFICANT CHANGE UP (ref 32–37)
MCV RBC AUTO: 76.1 FL — LOW (ref 81–99)
MONOCYTES # BLD AUTO: 3.6 K/UL — HIGH (ref 0.1–0.6)
MONOCYTES NFR BLD AUTO: 18.9 % — HIGH (ref 1.7–9.3)
NEUTROPHILS # BLD AUTO: 6.58 K/UL — HIGH (ref 1.4–6.5)
NEUTROPHILS NFR BLD AUTO: 34.4 % — LOW (ref 42.2–75.2)
NRBC # BLD: 5 /100 WBCS — HIGH (ref 0–0)
PLATELET # BLD AUTO: 23 K/UL — LOW (ref 130–400)
POTASSIUM SERPL-MCNC: 3.1 MMOL/L — LOW (ref 3.5–5)
POTASSIUM SERPL-SCNC: 3.1 MMOL/L — LOW (ref 3.5–5)
PROT SERPL-MCNC: 3.5 G/DL — LOW (ref 6–8)
PROTHROM AB SERPL-ACNC: 19.6 SEC — HIGH (ref 9.95–12.87)
RBC # BLD: 2.89 M/UL — LOW (ref 4.2–5.4)
RBC # FLD: 21.9 % — HIGH (ref 11.5–14.5)
SMOOTH MUSCLE AB SER-ACNC: SIGNIFICANT CHANGE UP
SODIUM SERPL-SCNC: 137 MMOL/L — SIGNIFICANT CHANGE UP (ref 135–146)
SPECIMEN SOURCE: SIGNIFICANT CHANGE UP
TYPE + AB SCN PNL BLD: SIGNIFICANT CHANGE UP
WBC # BLD: 19.06 K/UL — HIGH (ref 4.8–10.8)
WBC # FLD AUTO: 19.06 K/UL — HIGH (ref 4.8–10.8)

## 2018-07-19 RX ORDER — POTASSIUM CHLORIDE 20 MEQ
20 PACKET (EA) ORAL
Qty: 0 | Refills: 0 | Status: DISCONTINUED | OUTPATIENT
Start: 2018-07-19 | End: 2018-07-19

## 2018-07-19 RX ORDER — POTASSIUM CHLORIDE 20 MEQ
20 PACKET (EA) ORAL
Qty: 0 | Refills: 0 | Status: COMPLETED | OUTPATIENT
Start: 2018-07-19 | End: 2018-07-19

## 2018-07-19 RX ORDER — CALCIUM CARBONATE 500(1250)
1 TABLET ORAL DAILY
Qty: 0 | Refills: 0 | Status: DISCONTINUED | OUTPATIENT
Start: 2018-07-19 | End: 2018-07-24

## 2018-07-19 RX ADMIN — MEROPENEM 100 MILLIGRAM(S): 1 INJECTION INTRAVENOUS at 06:12

## 2018-07-19 RX ADMIN — Medication 20 MILLIEQUIVALENT(S): at 09:07

## 2018-07-19 RX ADMIN — Medication 20 MILLIEQUIVALENT(S): at 11:13

## 2018-07-19 RX ADMIN — MEROPENEM 100 MILLIGRAM(S): 1 INJECTION INTRAVENOUS at 17:09

## 2018-07-19 RX ADMIN — Medication 1 TABLET(S): at 17:09

## 2018-07-19 NOTE — PROGRESS NOTE ADULT - SUBJECTIVE AND OBJECTIVE BOX
Patient is a 78y old  Female who presents with a chief complaint of weakness (16 Jul 2018 23:57)      Subjective: Patient is resting in bed comfortably. No issues or complaints      Vital Signs Last 24 Hrs  T(C): 36.2 (19 Jul 2018 05:44), Max: 36.2 (19 Jul 2018 05:44)  T(F): 97.1 (19 Jul 2018 05:44), Max: 97.1 (19 Jul 2018 05:44)  HR: 77 (19 Jul 2018 05:44) (77 - 87)  BP: 102/48 (19 Jul 2018 05:44) (102/48 - 110/51)  BP(mean): --  RR: 18 (19 Jul 2018 05:44) (18 - 18)  SpO2: 94% (19 Jul 2018 07:37) (93% - 94%)    PHYSICAL EXAM  General: adult in NAD - weak  HEENT: clear oropharynx, icteric sclera, pale conjunctiva  Neck: supple  CV: normal S1/S2 with no murmur rubs or gallops  Lungs: positive air movement b/l ant lungs,clear to auscultation  Abdomen: distended but soft , non-tender   Ext: trace edema  Skin: no rashes and no petechiae  Neuro: alert and oriented X 4, no focal deficits    MEDICATIONS  (STANDING):  meropenem  IVPB      meropenem  IVPB 1000 milliGRAM(s) IV Intermittent every 12 hours  potassium chloride    Tablet ER 20 milliEquivalent(s) Oral every 2 hours  potassium chloride  20 mEq/100 mL IVPB 20 milliEquivalent(s) IV Intermittent every 2 hours  sodium chloride 0.9%. 1000 milliLiter(s) (75 mL/Hr) IV Continuous <Continuous>  vancomycin  IVPB      vancomycin  IVPB 1000 milliGRAM(s) IV Intermittent every 24 hours    MEDICATIONS  (PRN):      LABS:                          8.3    20.77 )-----------( 35       ( 18 Jul 2018 21:18 )             22.6         Mean Cell Volume : 76.4 fL  Mean Cell Hemoglobin : 28.0 pg  Mean Cell Hemoglobin Concentration : 36.7 g/dL  Auto Neutrophil # : x  Auto Lymphocyte # : x  Auto Monocyte # : x  Auto Eosinophil # : x  Auto Basophil # : x  Auto Neutrophil % : x  Auto Lymphocyte % : x  Auto Monocyte % : x  Auto Eosinophil % : x  Auto Basophil % : x      Serial CBC's  07-18 @ 21:18  Hct-22.6 / Hgb-8.3 / Plat-35 / RBC-2.96 / WBC-20.77  Serial CBC's  07-18 @ 07:08  Hct-22.5 / Hgb-8.3 / Plat-34 / RBC-2.97 / WBC-20.67  Serial CBC's  07-17 @ 17:49  Hct-23.6 / Hgb-8.7 / Plat-62 / RBC-3.12 / WBC-23.49  Serial CBC's  07-17 @ 08:02  Hct-24.7 / Hgb-9.1 / Plat-16 / RBC-3.30 / WBC-25.33  Serial CBC's  07-16 @ 17:05  Hct-20.6 / Hgb-7.5 / Plat-30 / RBC-2.71 / WBC-37.94      07-18    137  |  100  |  18  ----------------------------<  132<H>  3.3<L>   |  23  |  0.8    Ca    6.1<L>      18 Jul 2018 21:18  Mg     2.1     07-18    TPro  3.5<L>  /  Alb  2.0<L>  /  TBili  7.7<H>  /  DBili  x   /  AST  32  /  ALT  29  /  AlkPhos  160<H>  07-18      PT/INR - ( 18 Jul 2018 07:08 )   PT: 23.90 sec;   INR: 2.23 ratio         PTT - ( 18 Jul 2018 07:08 )  PTT:35.3 sec    Iron - Total Binding Capacity.: <106 ug/dL (07-17 @ 08:02)  Ferritin, Serum: 8729 ng/mL (07-17 @ 08:02)    Culture - Urine (collected 17 Jul 2018 23:00)  Source: .Urine Clean Catch (Midstream)  Final Report (19 Jul 2018 05:40):    No growth    Culture - Blood (collected 17 Jul 2018 17:49)  Source: .Blood None  Preliminary Report (19 Jul 2018 03:01):    No growth to date.      RADIOLOGY & ADDITIONAL STUDIES:  < from: CT Abdomen and Pelvis w/ IV Cont (07.17.18 @ 13:07) >  IMPRESSION:   1.  Since May 27, 2018, new moderate volume abdominopelvic ascites.    2.  Slightly improved splenomegaly now measuring 15 cm, previously 18 cm   with persistent heterogeneous enhancement.    3.  Mildly nodular hepatic contour,may be seen with cirrhosis; no focal   hepatic lesions or biliary ductal dilation.        < from: US Abdomen Limited (07.17.18 @ 10:10) >  IMPRESSION:    Liver is mildly lobulated in contour and heterogeneous in echotexture   consistent with liver disease.    No evidence of biliary duct dilatation.    Contracted gallbladder with stones.    Small to moderate right pleural effusion with atelectasis.    Increased ascites, now mild to moderate and previously mild              < end of copied text >

## 2018-07-19 NOTE — PROGRESS NOTE ADULT - ASSESSMENT
# ELEVATED BILIRUBIN / DIRECT BILIRUBIN > INDIRECT / ELEVATED ALP/ELEVATED INR / LOW PLATELETS / ELEVATED LDH / NORMAL AST / ALT / NORMAL ALK PHOSPHATASE  - Liver injury secondary to Drug toxicity - Vidaza, vs Chronic Liver disease vs less likely obstruction as no CBD dilatation, no AST/ALT/ALP elevation  -CT abdo w/IV (07/17): No evidence of obstruction. nodular liver contour and moderate ascites   -F/U MRCP results  -INR 3.86 --> now 1.83. pt given 5mg vit k Po yesterday. Another 5mg PO today.  - Once INR/Platelet count normalize--> plan for paracentesis and possible Liver Bx in the future. If Liver failure is the pathology, INR less likely to respond to vit k.  -hep panel -ve  -F/U COREY, SM Ab    #Sepsis  Blood Cx - Negative  D?C'ed vanco; keep meropenem as per Dr. Jackson    CML w/ persistent leukocytosis: on vidaza, last cycle June 4th.  -Vidaza stopped; no plan to restart it by Dr. Jackson as pt is not responding  -Will consider repeat bone marrow to rule out leukemic transformation or Palliative in light of the advanced disease with limited treatment options. Dr. Jackson to talk to daughter  - Keep hb >7 and PLt >55731D. keep active Type&screen  -DIC panel: fibrinogen 152, D-Dimer 3202. Heparin stopped, no active bleeding--> no platelet transfusion at this time. Plt 62,000 s/p 1 unit yesterday  	-Transfuse only if bleeding or plt<20  	-FFP if procedure is required. paracentesis?  -F/U Factor V, VIII    -Anemia  Hemolysis workup: LDH 1153 Ferritin 8729 TIBC <107  -No schistocytes     Hypocalcemia  -6.1 (7.7 after correction for albumin)--> Ca carbonate 500mg oral daily  -F/U 1,25-vit D    #Hypokalemia          #Signs of hypervolemia, possibly 2/2 hypoalbuminemia:  - minimal left costophrenic blunting on CXR, 2+ pitting edema of LE, ascites  - monitor fluid status carefully while on IVF    #DVT ppx:  - Heparin SubQ    #Dispo:  - will get PT eval since patient has been increasingly weak and lives alone

## 2018-07-19 NOTE — PROGRESS NOTE ADULT - ASSESSMENT
79 y/o F with CMML p/w hypotension, worsening fatigue and  jaundice, and anemia    1. Jaundice with elevated direct bilirubin , normal liver enzymes and Alk phos, prolonged INR/PT  - No evidence of obstruction on CT abdomen and US abdomen : Nodular contour of liver suggestive of liver disease . + moderate ascites  - Peripheral smear did not show any schistocytes   - hepatitis panel negative  - R/O drug related cholestasis vs leukemic infiltration - Patient may need liver biopsy eventually if workup negative  - MRCP pending  - Follow up sepsis workup - and consider deescalating antibiotics if workup negative        2. Elevated INR - coagulopathy with no bleeding probably secondary to liver dysfunction +/_ DIC   - s/p 1 dose of vitamin K po on 7/17 and 5mg on 7/18 with no adquate response to vitamin K .  - DIC panel showed a fibrinogen level of 150, elevated D dimer .   - Transfuse blood products for active bleeding or invasive procedures.     3. CMML with persistent leucocytosis: On vidaza. last dose ( last cycle June 4)  - Will consider repeat bone marrow to rule out leukemic transformation    or Palliative in light of the advanced disease with limited treatment options  - Keep hb >7 and PLt >[id number]    4. C/W meds for other co-morbidities.    5. DVT/GI ppx: Sequentials for now    Plan : Follow daily labs           MRCP today           Will discuss with attending

## 2018-07-19 NOTE — PROGRESS NOTE ADULT - SUBJECTIVE AND OBJECTIVE BOX
GALA PHILLIPS, female, 78y ([birth year]),   MRN-[id number]  Admit Date: 07-16-18 (3d)    CODE STATUS:  DISPO:    Chief Complaint:  Patient is a 78y old  Female who presents with a chief complaint of weakness (16 Jul 2018 23:57)      Interval History:  Pt very pleasant. still reports feeling comfortable. No active complaints.    Past Medical and Surgical History:  PAST MEDICAL & SURGICAL HISTORY:  Thrombocytopenia  Chronic myelomonocytic leukemia not having achieved remission  Non-Hodgkins lymphoma  S/P total abdominal hysterectomy      Current Medications:  MEDICATIONS  (STANDING):  calcium carbonate    500 mG (Tums) Chewable 1 Tablet(s) Chew daily  meropenem  IVPB      meropenem  IVPB 1000 milliGRAM(s) IV Intermittent every 12 hours  potassium chloride  20 mEq/100 mL IVPB 20 milliEquivalent(s) IV Intermittent every 2 hours  vancomycin  IVPB      vancomycin  IVPB 1000 milliGRAM(s) IV Intermittent every 24 hours    MEDICATIONS  (PRN):        Vital Signs:  T(F): 97.6 (07-19-18 @ 14:50), Max: 97.6 (07-19-18 @ 14:50)  HR: 91 (07-19-18 @ 14:50) (75 - 91)  BP: 101/53 (07-19-18 @ 14:50) (95/48 - 110/51)  RR: 18 (07-19-18 @ 14:50) (17 - 18)  SpO2: 94% (07-19-18 @ 07:37) (93% - 96%)  CAPILLARY BLOOD GLUCOSE          Physical Exam:  General: Not in distress.   HEENT: Moist mucus membranes. PERRLA.  Cardio: Regular rate and rhythm, S1, S2, no murmur, rub, or gallop.  Pulm: Clear to auscultation bilaterally. No wheezing, rales, or rhonchi.  Abdomen:Soft, non-tender, +BS. No guarding. mild-moderate distension seconadry to ascites  Extremities: No cyanosis or edema bilaterally. No calf tenderness to palpation.  Neuro: A&O x3. No focal deficits. CN II - XII grossly intact.    Labs and Imaging:  CBC Full  -  ( 19 Jul 2018 06:46 )  WBC Count : 19.06 K/uL  Hemoglobin : 8.1 g/dL  Hematocrit : 22.0 %  Platelet Count - Automated : 23 K/uL  Mean Cell Volume : 76.1 fL  Mean Cell Hemoglobin : 28.0 pg  Mean Cell Hemoglobin Concentration : 36.8 g/dL  Auto Neutrophil # : 6.58 K/uL  Auto Lymphocyte # : 3.70 K/uL  Auto Monocyte # : 3.60 K/uL  Auto Eosinophil # : 0.03 K/uL  Auto Basophil # : 0.03 K/uL  Auto Neutrophil % : 34.4 %  Auto Lymphocyte % : 19.4 %  Auto Monocyte % : 18.9 %  Auto Eosinophil % : 0.2 %  Auto Basophil % : 0.2 %    RDW: 21.9  21.9    PT/INR/PTT: 07-19-18 @ 06:46  19.60 | 35.1        ^      1.83  PT/INR/PTT: 07-18-18 @ 07:08  23.90 | 35.3        ^      2.23    BMP: 07-19-18 @ 06:46  137 | 99 | 16   -----------------< 105  3.1  | 26 | 0.8  eGFR(AA): 83, eGFR (non-AA): 72  Ca 6.0, Mg 1.7, P --  BMP: 07-18-18 @ 21:18  137 | 100 | 18   -----------------< 132  3.3  | 23 | 0.8  eGFR(AA): 83, eGFR (non-AA): 72  Ca 6.1, Mg --, P --    LFTs: 07-19-18 @ 06:46  TP  3.5  | 2.0 Alb   ---------------  TB  7.6  | 5.9 DB   ---------------  ALT 30  | 31  AST            ^          176 ALK  LFTs: 07-18-18 @ 21:18  TP  3.5  | 2.0 Alb   ---------------  TB  7.7  | --  DB   ---------------  ALT 29  | 32  AST            ^          160 ALK  LFTs: 07-18-18 @ 07:08  TP  3.4  | 2.2 Alb   ---------------  TB  7.4  | 5.9 DB   ---------------  ALT 30  | 34  AST            ^          138 ALK      LFTs: 07-19-18 @ 06:46  Ca  6.0  | 31 AST   -----------------  TP  3.5  | 30 ALT  -----------------  Alb 2.0  | 176 ALK          ^        7.6         TB  LFTs: 07-18-18 @ 21:18  Ca  6.1  | 32 AST   -----------------  TP  3.5  | 29 ALT  -----------------  Alb 2.0  | 160 ALK          ^        7.7         TB      Cardiac Enzymes:    Urinalysis:    Cultures:     (collected 07-17-18 @ 23:00)  Source: .Urine Clean Catch (Midstream)  Final Report:    No growth     (collected 07-17-18 @ 17:49)  Source: .Blood None  Preliminary Report:    No growth to date.        Home Medications:  Home Medications:

## 2018-07-20 LAB
ALBUMIN SERPL ELPH-MCNC: 2.2 G/DL — LOW (ref 3.5–5.2)
ALP SERPL-CCNC: 180 U/L — HIGH (ref 30–115)
ALT FLD-CCNC: 29 U/L — SIGNIFICANT CHANGE UP (ref 0–41)
ANA TITR SER: NEGATIVE — SIGNIFICANT CHANGE UP
ANION GAP SERPL CALC-SCNC: 13 MMOL/L — SIGNIFICANT CHANGE UP (ref 7–14)
AST SERPL-CCNC: 29 U/L — SIGNIFICANT CHANGE UP (ref 0–41)
BILIRUB SERPL-MCNC: 7.1 MG/DL — HIGH (ref 0.2–1.2)
BUN SERPL-MCNC: 13 MG/DL — SIGNIFICANT CHANGE UP (ref 10–20)
CALCIUM SERPL-MCNC: 6.2 MG/DL — LOW (ref 8.5–10.1)
CHLORIDE SERPL-SCNC: 97 MMOL/L — LOW (ref 98–110)
CO2 SERPL-SCNC: 26 MMOL/L — SIGNIFICANT CHANGE UP (ref 17–32)
CREAT SERPL-MCNC: 0.7 MG/DL — SIGNIFICANT CHANGE UP (ref 0.7–1.5)
FACT V ACT/NOR PPP: 58 % — SIGNIFICANT CHANGE UP (ref 50–150)
FACT VIII ACT/NOR PPP: 169 % — HIGH (ref 50–150)
GLUCOSE SERPL-MCNC: 127 MG/DL — HIGH (ref 70–99)
HCT VFR BLD CALC: 22.8 % — LOW (ref 37–47)
HGB BLD-MCNC: 8.2 G/DL — LOW (ref 12–16)
MCHC RBC-ENTMCNC: 27.9 PG — SIGNIFICANT CHANGE UP (ref 27–31)
MCHC RBC-ENTMCNC: 36 G/DL — SIGNIFICANT CHANGE UP (ref 32–37)
MCV RBC AUTO: 77.6 FL — LOW (ref 81–99)
NRBC # BLD: 6 /100 WBCS — HIGH (ref 0–0)
PLATELET # BLD AUTO: 19 K/UL — CRITICAL LOW (ref 130–400)
POTASSIUM SERPL-MCNC: 3.5 MMOL/L — SIGNIFICANT CHANGE UP (ref 3.5–5)
POTASSIUM SERPL-SCNC: 3.5 MMOL/L — SIGNIFICANT CHANGE UP (ref 3.5–5)
PROT SERPL-MCNC: 3.7 G/DL — LOW (ref 6–8)
RBC # BLD: 2.94 M/UL — LOW (ref 4.2–5.4)
RBC # FLD: 22.7 % — HIGH (ref 11.5–14.5)
SODIUM SERPL-SCNC: 136 MMOL/L — SIGNIFICANT CHANGE UP (ref 135–146)
VIT D25+D1,25 OH+D1,25 PNL SERPL-MCNC: 14.4 PG/ML — LOW (ref 19.9–79.3)
WBC # BLD: 23.71 K/UL — HIGH (ref 4.8–10.8)
WBC # FLD AUTO: 23.71 K/UL — HIGH (ref 4.8–10.8)

## 2018-07-20 RX ADMIN — Medication 1 TABLET(S): at 16:04

## 2018-07-20 RX ADMIN — MEROPENEM 100 MILLIGRAM(S): 1 INJECTION INTRAVENOUS at 17:27

## 2018-07-20 RX ADMIN — MEROPENEM 100 MILLIGRAM(S): 1 INJECTION INTRAVENOUS at 06:12

## 2018-07-20 NOTE — PROGRESS NOTE ADULT - SUBJECTIVE AND OBJECTIVE BOX
GI HPI Today:  Patient denies any abdominal pain, vomiting, diarrhea, blood in the stool or fever      Hospital course:  77 y/o F with PMH of NHL diagnosed 5 years ago and recently diagnosed CML this past October presented from Dr. Jackson's office for jaundice, hypotension, and anemia. Patient did not notice that she was jaundiced, but she did feel extremely weak. Patient lives at home alone and is able to independently perform her ADLs. She knows that when she feels this weak, she likely needs blood transfusion. However, during her last appt, she was not transfused because she was told her hemoglobin was not that low. Patient denied fever, chills, headache, dizziness, chest pain, SOB, n/v/d, or dysuria. Patient also reported that her legs were more swollen than usual because she has been walking less for the past 2 weeks due to weakness. Vitals in ED were T 97.3F, BP 80/44, HR 80, RR 16, SpO2 98% on room air. She was also found to have anemia, leukocytosis with a left shift and monocytosis, hypokalemia, hypomagnasemia, hyperbilirubinemia, elevated INR, lactic acidosis, and ascites and hepatosplenomegaly on RUQ US from 7/10. In ED, she received 2U PRBC, 250cc NS bolus with maintenance NS @ 75 cc/hr, 2gm Mag rider, and 80meq of potassium.    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 At that time noted to have thrombocytopenia, s/p Bone marrow biopsy for suspected bone marrow involvement. It was hypercellular, however flow from bone marrow was negative.  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 Jackson said it wasn't effective and she may need alternative therapy. (16 Jul 2018 20:41)      PAST MEDICAL & SURGICAL HISTORY  Thrombocytopenia  Chronic myelomonocytic leukemia not having achieved remission  Non-Hodgkins lymphoma  S/P total abdominal hysterectomy      ALLERGIES:  penicillin (Unknown)      MEDICATIONS:  MEDICATIONS  (STANDING):  calcium carbonate    500 mG (Tums) Chewable 1 Tablet(s) Chew daily  meropenem  IVPB      meropenem  IVPB 1000 milliGRAM(s) IV Intermittent every 12 hours  potassium chloride  20 mEq/100 mL IVPB 20 milliEquivalent(s) IV Intermittent every 2 hours    MEDICATIONS  (PRN):      REVIEW OF SYSTEMS:    CONSTITUTIONAL: weakness, fatigue, malaise, no  fevers or chills,   Throat: No Dysphagia, No Odynophagia  RESPIRATORY: No SOB  CARDIOVASCULAR: No chest pain   Muscloskeletal: no joints pain  NEUROLOGICAL: No syncope or diziness  Heme/Lymph: no LN enlargement         VITALS:   T(F): 96 (07-20 @ 05:30), Max: 98.6 (07-19 @ 21:21)  HR: 78 (07-20 @ 05:30) (75 - 91)  BP: 105/52 (07-20 @ 05:30) (95/48 - 116/58)  BP(mean): --  RR: 183 (07-20 @ 05:30) (17 - 183)  SpO2: 94% (07-20 @ 03:37) (93% - 96%)        PHYSICAL EXAM:  EYES: scleral icterus   LUNG: Clear to auscultation bilaterally; No rales, rhonchi, wheezing, or rubs  HEART: RRR; No murmurs  ABDOMEN: Soft, +BS, no Abdominal Tenderness, No guarding, No Lanza Sign   Rectal Exam: not performed     Blood Work :                        8.1    19.06 )-----------( 23       ( 19 Jul 2018 06:46 )             22.0     PT/INR - ( 19 Jul 2018 06:46 )  INR: 1.83          PTT - ( 19 Jul 2018 06:46 )  PTT:35.1   07-19    137  |  99  |  16  ----------------------------<  105<H>  3.1<L>   |  26  |  0.8    Ca    6.0<L>      19 Jul 2018 06:46  Mg     1.7     07-19      CBC -  ( 19 Jul 2018 06:46 )  Hemoglobin : 8.1    CBC -  ( 18 Jul 2018 21:18 )  Hemoglobin : 8.3    CBC -  ( 18 Jul 2018 07:08 )  Hemoglobin : 8.3    CBC -  ( 17 Jul 2018 17:49 )  Hemoglobin : 8.7    CBC -  ( 17 Jul 2018 08:02 )  Hemoglobin : 9.1    CBC -  ( 16 Jul 2018 17:05 )  Hemoglobin : 7.5      LIVER FUNCTIONS - ( 19 Jul 2018 06:46 )  Alb: 2.0 [3.5 - 5.2] / Pro: 3.5 [6.0 - 8.0] / ALK PHOS: 176 [30 - 115] / ALT: 30 [0 - 41] / AST: 31 [0 - 41] / GGT: x     LIVER FUNCTIONS - ( 18 Jul 2018 21:18 )  Alb: 2.0 [3.5 - 5.2] / Pro: 3.5 [6.0 - 8.0] / ALK PHOS: 160 [30 - 115] / ALT: 29 [0 - 41] / AST: 32 [0 - 41] / GGT: x     LIVER FUNCTIONS - ( 18 Jul 2018 07:08 )  Alb: 2.2 [3.5 - 5.2] / Pro: 3.4 [6.0 - 8.0] / ALK PHOS: 138 [30 - 115] / ALT: 30 [0 - 41] / AST: 34 [0 - 41] / GGT: x     LIVER FUNCTIONS - ( 17 Jul 2018 08:02 )  Alb: 2.1 [3.5 - 5.2] / Pro: 3.5 [6.0 - 8.0] / ALK PHOS: 82 [30 - 115] / ALT: 31 [0 - 41] / AST: 35 [0 - 41] / GGT: x     LIVER FUNCTIONS - ( 16 Jul 2018 17:05 )  Alb: 2.4 [3.5 - 5.2] / Pro: 4.1 [6.0 - 8.0] / ALK PHOS: 94 [30 - 115] / ALT: 32 [0 - 41] / AST: 28 [0 - 41] / GGT: x         RADIOLOGY:  < from: CT Abdomen and Pelvis w/ IV Cont (07.17.18 @ 13:07) >  LOWER CHEST: Small bilateral pleural effusions and atelectasis, new.    HEPATOBILIARY: No focal hepatic lesions. No biliary ductal dilation.   Mildly nodular hepatic contour.    SPLEEN: Enlarged spleen measuring up to 15 cm in craniocaudal dimension,   previously 18 cm. Persistent marked heterogeneous enhancement of the   parenchyma noted.    PANCREAS: Unremarkable.    ADRENAL GLANDS: Unremarkable.    KIDNEYS: Symmetric enhancement bilaterally. No hydronephrosis.    ABDOMINOPELVICNODES: No lymphadenopathy.    PELVIC ORGANS: Post hysterectomy.    PERITONEUM/MESENTERY/BOWEL: Moderate volume abdominopelvic ascites. No   bowel obstruction or pneumoperitoneum.    BONES/SOFT TISSUES: Degenerative changes of the thoracolumbar spine noted.    IMPRESSION:   1.  Since May 27, 2018, new moderate volume abdominopelvic ascites.    2.  Slightly improved splenomegaly now measuring 15 cm, previously 18 cm   with persistent heterogeneous enhancement.    3.  Mildly nodular hepatic contour,may be seen with cirrhosis; no focal   hepatic lesions or biliary ductal dilation.        < end of copied text >

## 2018-07-20 NOTE — PROGRESS NOTE ADULT - ASSESSMENT
7 y/o F with PMH of marginal zone lymphoma of face diagnosed 5 years ago , thrombocytopenia and recently diagnosed CML this past October presented from Dr. Jackson's office for jaundice, hypotension, and anemia.     # ELEVATED BILIRUBIN / DIRECT BILIRUBIN > INDIRECT / ELEVATED INR / LOW PLATELETS / ELEVATED LDH / NORMAL AST / ALT / ELEVATED ALK PHOSPHATASE    - Differential could be Liver injury sec to  Drug toxicity -VIDAZA  vs CLD VS  vanishing duct sd vs less likely Hemolysis or Obstruction as no CBD dilatation , no elevation of AST/ALT   - Patient was on rituximab 1 year ago pt was given Vidaza for the last time 2 weeks ago . As bilirubin and INR are trending up since May , 2018.  - CT abdomen showed nodular liver contour and moderate ascites   When INR and platelet improve- consider abdominal tap and liver biopsy in the future   follow CLD workup, till now ASMA negative and infectious hepatitis panel negative  - MRCP still pending   - follow liver enzymes   - continue  vit K to reverse INR   - monitor for signs of hepatic encephalopathy .    # ANEMIA :    - Hb stable , no signs of active bleeding   - transfuse as needed to keep hb > 7.  - monitor for signs of bleeding as platelets very low ; platelet count pending today

## 2018-07-20 NOTE — PROGRESS NOTE ADULT - ASSESSMENT
# ELEVATED BILIRUBIN / DIRECT BILIRUBIN > INDIRECT / ELEVATED ALP/ELEVATED INR / LOW PLATELETS / ELEVATED LDH / NORMAL AST / ALT / NORMAL ALK PHOSPHATASE  - Liver injury secondary to Drug toxicity - Vidaza, vs Chronic Liver disease vs less likely obstruction as no CBD dilatation, no AST/ALT/ALP elevation  -CT abdo w/IV (07/17): No evidence of obstruction. nodular liver contour and moderate ascites   -F/U MRCP results  -INR 3.86 --> now 1.83.   - -hep panel -ve      #Sepsis  Blood Cx - Negative  D?C'ed vanco; keep meropenem as per Dr. Jackson    CML w/ persistent leukocytosis: on vidaza, last cycle June 4th.  -Vidaza stopped; no plan to restart it by Dr. Jackson as pt is not responding  -Will consider repeat bone marrow to rule out leukemic transformation or Palliative in light of the advanced disease with limited treatment options. Dr. Jackson to talk to daughter  - Keep hb >7 and PLt >19524R. keep active Type&screen  -DIC panel: fibrinogen 152, D-Dimer 3202. Heparin stopped, no active bleeding--> no platelet transfusion at this time. Plt 62,000 s/p 1 unit yesterday  	-Transfuse only if bleeding or plt<20  	-FFP if procedure is required. paracentesis?  -F/U Factor V, VIII    -Anemia  Hemolysis workup: LDH 1153 Ferritin 8729 TIBC <107  -No schistocytes     Hypocalcemia  -6.1 (7.7 after correction for albumin)--> Ca carbonate 500mg oral daily  -F/U 1,25-vit D    #Hypokalemia          #Signs of hypervolemia, possibly 2/2 hypoalbuminemia:  - minimal left costophrenic blunting on CXR, 2+ pitting edema of LE, ascites  - monitor fluid status carefully while on IVF    #DVT ppx:  - Heparin SubQ    #Dispo:  - will get PT eval since patient has been increasingly weak and lives alone

## 2018-07-20 NOTE — PROGRESS NOTE ADULT - SUBJECTIVE AND OBJECTIVE BOX
Patient is a 78y old  Female who presents with a chief complaint of weakness (16 Jul 2018 23:57)      Subjective: no issues overnight . Patient is clinically declining      Vital Signs Last 24 Hrs  T(C): 35.6 (20 Jul 2018 05:30), Max: 37 (19 Jul 2018 21:21)  T(F): 96 (20 Jul 2018 05:30), Max: 98.6 (19 Jul 2018 21:21)  HR: 78 (20 Jul 2018 05:30) (78 - 91)  BP: 105/52 (20 Jul 2018 05:30) (101/53 - 116/58)  BP(mean): --  RR: 183 (20 Jul 2018 05:30) (18 - 183)  SpO2: 94% (20 Jul 2018 03:37) (94% - 95%)    PHYSICAL EXAM  General: cachectic, jaudiced  HEENT: clear oropharynx, icteric sclera, pink conjunctiva  Neck: supple  CV: normal S1/S2 with no murmur rubs or gallops  Lungs: positive air movement b/l ant lungs,clear to auscultation, no wheezes, no rales  Abdomen: soft non-tender non-distended   Ext: no clubbing cyanosis or edema  Skin: no rashes and no petechiae  Neuro: alert and oriented X 4, no focal deficits    MEDICATIONS  (STANDING):  calcium carbonate    500 mG (Tums) Chewable 1 Tablet(s) Chew daily  meropenem  IVPB      meropenem  IVPB 1000 milliGRAM(s) IV Intermittent every 12 hours  potassium chloride  20 mEq/100 mL IVPB 20 milliEquivalent(s) IV Intermittent every 2 hours    MEDICATIONS  (PRN):      LABS:                          8.1    19.06 )-----------( 23       ( 19 Jul 2018 06:46 )             22.0         Mean Cell Volume : 76.1 fL  Mean Cell Hemoglobin : 28.0 pg  Mean Cell Hemoglobin Concentration : 36.8 g/dL  Auto Neutrophil # : 6.58 K/uL  Auto Lymphocyte # : 3.70 K/uL  Auto Monocyte # : 3.60 K/uL  Auto Eosinophil # : 0.03 K/uL  Auto Basophil # : 0.03 K/uL  Auto Neutrophil % : 34.4 %  Auto Lymphocyte % : 19.4 %  Auto Monocyte % : 18.9 %  Auto Eosinophil % : 0.2 %  Auto Basophil % : 0.2 %      Serial CBC's  07-19 @ 06:46  Hct-22.0 / Hgb-8.1 / Plat-23 / RBC-2.89 / WBC-19.06  Serial CBC's  07-18 @ 21:18  Hct-22.6 / Hgb-8.3 / Plat-35 / RBC-2.96 / WBC-20.77  Serial CBC's  07-18 @ 07:08  Hct-22.5 / Hgb-8.3 / Plat-34 / RBC-2.97 / WBC-20.67  Serial CBC's  07-17 @ 17:49  Hct-23.6 / Hgb-8.7 / Plat-62 / RBC-3.12 / WBC-23.49  Serial CBC's  07-17 @ 08:02  Hct-24.7 / Hgb-9.1 / Plat-16 / RBC-3.30 / WBC-25.33  Serial CBC's  07-16 @ 17:05  Hct-20.6 / Hgb-7.5 / Plat-30 / RBC-2.71 / WBC-37.94      07-19    137  |  99  |  16  ----------------------------<  105<H>  3.1<L>   |  26  |  0.8    Ca    6.0<L>      19 Jul 2018 06:46  Mg     1.7     07-19    TPro  3.5<L>  /  Alb  2.0<L>  /  TBili  7.6<H>  /  DBili  5.9<H>  /  AST  31  /  ALT  30  /  AlkPhos  176<H>  07-19      PT/INR - ( 19 Jul 2018 06:46 )   PT: 19.60 sec;   INR: 1.83 ratio         PTT - ( 19 Jul 2018 06:46 )  PTT:35.1 sec    Iron - Total Binding Capacity.: <106 ug/dL (07-17 @ 08:02)  Ferritin, Serum: 8729 ng/mL (07-17 @ 08:02)    Culture - Urine (collected 17 Jul 2018 23:00)  Source: .Urine Clean Catch (Midstream)  Final Report (19 Jul 2018 05:40):    No growth    Culture - Blood (collected 17 Jul 2018 17:49)  Source: .Blood None  Preliminary Report (19 Jul 2018 03:01):    No growth to date.    RADIOLOGY & ADDITIONAL STUDIES:  No new imaging

## 2018-07-20 NOTE — PROGRESS NOTE ADULT - ASSESSMENT
77 y/o F with CMML p/w hypotension, worsening fatigue and  jaundice, and anemia    1. Jaundice with elevated direct bilirubin , normal liver enzymes and Alk phos, prolonged INR/PT  - No evidence of obstruction on CT abdomen and US abdomen : Nodular contour of liver suggestive of liver disease . + moderate ascites  - Peripheral smear did not show any schistocytes   - hepatitis panel negative  - R/O drug related cholestasis vs leukemic infiltration - Patient may need liver biopsy eventually if workup negative  - MRCP pending  - Continue with meropenem        2. Elevated INR - coagulopathy with no bleeding probably secondary to liver dysfunction +/_ DIC   - s/p 2 dose of vitamin K po - INR <2 now - follow daily INR  - DIC panel showed a low fibrinogen , elevated D dimer .   - Transfuse blood products for active bleeding or invasive procedures.     3. CMML with persistent leucocytosis: On vidaza. last dose ( last cycle June 4)  - Will consider repeat bone marrow to rule out leukemic transformation or Palliative in light of the advanced disease with limited treatment options  - Keep hb >7 and PLt >81211U  - Will discuss prognosis with daughter and patient     4. C/W meds for other co-morbidities.    5. DVT/GI ppx: Sequentials for now    Plan : Follow daily labs           MRCP today           Will discuss with attending 79 y/o F with CMML p/w hypotension, worsening fatigue and  jaundice, and anemia    1. Jaundice with elevated direct bilirubin , normal liver enzymes and Alk phos, prolonged INR/PT  - No evidence of obstruction on CT abdomen and US abdomen : Nodular contour of liver suggestive of liver disease . + moderate ascites  - Peripheral smear did not show any schistocytes   - hepatitis panel negative , CLD workup pending  - R/O drug related cholestasis vs leukemic infiltration - Patient may need liver biopsy eventually if workup negative or paracenthesis once INR low  - MRCP pending to complete workup  - Continue with meropenem for now    2. Elevated INR - coagulopathy with no bleeding probably secondary to liver dysfunction +/_ DIC   - s/p 2 dose of vitamin K po - INR <2 now - follow daily INR  - DIC panel showed a low fibrinogen , elevated D dimer .   - Transfuse blood products for active bleeding or invasive procedures.     3. CMML with persistent leucocytosis: On vidaza. last dose ( last cycle June 4)  - Will consider repeat bone marrow to rule out leukemic transformation or Palliative in light of the advanced disease with limited treatment options  - Keep hb >7 and PLt >21961L  - Will discuss prognosis with daughter and patient today     4. C/W meds for other co-morbidities.    5. DVT/GI ppx: Sequentials for now    Plan : Follow daily labs           MRCP today           Goals of care

## 2018-07-20 NOTE — PROGRESS NOTE ADULT - SUBJECTIVE AND OBJECTIVE BOX
GALA PHILLIPS, female, 78y ([birth year]),   MRN-[id number]  Admit Date: 07-16-18 (4d)    CODE STATUS:  DISPO:    Chief Complaint:  Patient is a 78y old  Female who presents with a chief complaint of weakness (16 Jul 2018 23:57)      Interval History:  Held discussion with hem/omc fellow + pt and daughter, explained the poor prognosis, as well as the plan to stop chemotherapy as it seems to be doing more harm than good at the moment. Pt understanding, daughter too. Offered palliative care which they are willing to consider. Pt wants decision on DNR/DNI to be deferred to her daughter and son. Daughter said she needs more time to think about it. Will visit the issue again later.    Past Medical and Surgical History:  PAST MEDICAL & SURGICAL HISTORY:  Thrombocytopenia  Chronic myelomonocytic leukemia not having achieved remission  Non-Hodgkins lymphoma  S/P total abdominal hysterectomy      Current Medications:  MEDICATIONS  (STANDING):  calcium carbonate    500 mG (Tums) Chewable 1 Tablet(s) Chew daily  meropenem  IVPB      meropenem  IVPB 1000 milliGRAM(s) IV Intermittent every 12 hours  potassium chloride  20 mEq/100 mL IVPB 20 milliEquivalent(s) IV Intermittent every 2 hours    MEDICATIONS  (PRN):        Vital Signs:  T(F): 96.8 (07-20-18 @ 14:30), Max: 98.6 (07-19-18 @ 21:21)  HR: 91 (07-20-18 @ 14:30) (77 - 91)  BP: 111/52 (07-20-18 @ 14:30) (101/53 - 116/58)  RR: 18 (07-20-18 @ 14:30) (18 - 18)  SpO2: 94% (07-20-18 @ 03:37) (94% - 95%)  CAPILLARY BLOOD GLUCOSE          Physical Exam:  General: Not in distress.   HEENT: Moist mucus membranes. PERRLA.  Cardio: Regular rate and rhythm, S1, S2, no murmur, rub, or gallop.  Pulm: Clear to auscultation bilaterally. No wheezing, rales, or rhonchi.  Abdomen:Soft, non-tender, +BS. No guarding. mild-moderate distension seconadry to ascites  Extremities: No cyanosis or edema bilaterally. No calf tenderness to palpation.  Neuro: A&O x3. No focal deficits. CN II - XII grossly intact.    Labs and Imaging:  CBC Full  -  ( 20 Jul 2018 06:43 )  WBC Count : 23.71 K/uL  Hemoglobin : 8.2 g/dL  Hematocrit : 22.8 %  Platelet Count - Automated : 19 K/uL  Mean Cell Volume : 77.6 fL  Mean Cell Hemoglobin : 27.9 pg  Mean Cell Hemoglobin Concentration : 36.0 g/dL  Auto Neutrophil # : x  Auto Lymphocyte # : x  Auto Monocyte # : x  Auto Eosinophil # : x  Auto Basophil # : x  Auto Neutrophil % : x  Auto Lymphocyte % : x  Auto Monocyte % : x  Auto Eosinophil % : x  Auto Basophil % : x    RDW: 22.7    PT/INR/PTT: 07-19-18 @ 06:46  19.60 | 35.1        ^      1.83    BMP: 07-20-18 @ 06:43  136 | 97 | 13   -----------------< 127  3.5  | 26 | 0.7  eGFR(AA): 98, eGFR (non-AA): 84  Ca 6.2, Mg --, P --    LFTs: 07-20-18 @ 06:43  TP  3.7  | 2.2 Alb   ---------------  TB  7.1  | --  DB   ---------------  ALT 29  | 29  AST            ^          180 ALK  LFTs: 07-19-18 @ 06:46  TP  3.5  | 2.0 Alb   ---------------  TB  7.6  | 5.9 DB   ---------------  ALT 30  | 31  AST            ^          176 ALK  LFTs: 07-18-18 @ 21:18  TP  3.5  | 2.0 Alb   ---------------  TB  7.7  | --  DB   ---------------  ALT 29  | 32  AST            ^          160 ALK      LFTs: 07-20-18 @ 06:43  Ca  6.2  | 29 AST   -----------------  TP  3.7  | 29 ALT  -----------------  Alb 2.2  | 180 ALK          ^        7.1         TB           (collected 07-17-18 @ 23:00)  Source: .Urine Clean Catch (Midstream)  Final Report:    No growth     (collected 07-17-18 @ 17:49)  Source: .Blood None  Preliminary Report:    No growth to date.      < from: MR Abdomen No Cont (07.20.18 @ 12:59) >  1. Unchanged abdominopelvic ascites, soft tissue anasarca and bilateral   pleural effusions.  2. Diffuse hepatic steatosis with nodular liver contour suggesting   cirrhosis.  3. Indeterminate multifocal bilobar T2 hyperintense foci scattered   throughout the liver.  4. Splenomegaly, essentially unchanged.    < end of copied text >      Home Medications:  Home Medications:

## 2018-07-21 LAB
ALBUMIN SERPL ELPH-MCNC: 2.2 G/DL — LOW (ref 3.5–5.2)
ALP SERPL-CCNC: 214 U/L — HIGH (ref 30–115)
ALT FLD-CCNC: 32 U/L — SIGNIFICANT CHANGE UP (ref 0–41)
ANION GAP SERPL CALC-SCNC: 15 MMOL/L — HIGH (ref 7–14)
AST SERPL-CCNC: 39 U/L — SIGNIFICANT CHANGE UP (ref 0–41)
BASOPHILS # BLD AUTO: 0.12 K/UL — SIGNIFICANT CHANGE UP (ref 0–0.2)
BASOPHILS NFR BLD AUTO: 0.3 % — SIGNIFICANT CHANGE UP (ref 0–1)
BILIRUB SERPL-MCNC: 7.5 MG/DL — HIGH (ref 0.2–1.2)
BUN SERPL-MCNC: 12 MG/DL — SIGNIFICANT CHANGE UP (ref 10–20)
CALCIUM SERPL-MCNC: 6.8 MG/DL — LOW (ref 8.5–10.1)
CHLORIDE SERPL-SCNC: 94 MMOL/L — LOW (ref 98–110)
CO2 SERPL-SCNC: 25 MMOL/L — SIGNIFICANT CHANGE UP (ref 17–32)
CREAT SERPL-MCNC: 0.9 MG/DL — SIGNIFICANT CHANGE UP (ref 0.7–1.5)
EOSINOPHIL # BLD AUTO: 0.04 K/UL — SIGNIFICANT CHANGE UP (ref 0–0.7)
EOSINOPHIL NFR BLD AUTO: 0.1 % — SIGNIFICANT CHANGE UP (ref 0–8)
GLUCOSE SERPL-MCNC: 125 MG/DL — HIGH (ref 70–99)
HAPTOGLOB SERPL-MCNC: <20 MG/DL — LOW (ref 34–200)
HCT VFR BLD CALC: 24.5 % — LOW (ref 37–47)
HGB BLD-MCNC: 8.8 G/DL — LOW (ref 12–16)
IMM GRANULOCYTES NFR BLD AUTO: 33.2 % — HIGH (ref 0.1–0.3)
INR BLD: 2.11 RATIO — HIGH (ref 0.65–1.3)
LYMPHOCYTES # BLD AUTO: 16.6 % — LOW (ref 20.5–51.1)
LYMPHOCYTES # BLD AUTO: 5.92 K/UL — HIGH (ref 1.2–3.4)
MCHC RBC-ENTMCNC: 28 PG — SIGNIFICANT CHANGE UP (ref 27–31)
MCHC RBC-ENTMCNC: 35.9 G/DL — SIGNIFICANT CHANGE UP (ref 32–37)
MCV RBC AUTO: 78 FL — LOW (ref 81–99)
MONOCYTES # BLD AUTO: 8.26 K/UL — HIGH (ref 0.1–0.6)
MONOCYTES NFR BLD AUTO: 23.2 % — HIGH (ref 1.7–9.3)
NEUTROPHILS # BLD AUTO: 9.51 K/UL — HIGH (ref 1.4–6.5)
NEUTROPHILS NFR BLD AUTO: 26.6 % — LOW (ref 42.2–75.2)
NRBC # BLD: 7 /100 WBCS — HIGH (ref 0–0)
PLATELET # BLD AUTO: 22 K/UL — LOW (ref 130–400)
POTASSIUM SERPL-MCNC: 3.8 MMOL/L — SIGNIFICANT CHANGE UP (ref 3.5–5)
POTASSIUM SERPL-SCNC: 3.8 MMOL/L — SIGNIFICANT CHANGE UP (ref 3.5–5)
PROT SERPL-MCNC: 4 G/DL — LOW (ref 6–8)
PROTHROM AB SERPL-ACNC: 22.6 SEC — HIGH (ref 9.95–12.87)
RBC # BLD: 3.14 M/UL — LOW (ref 4.2–5.4)
RBC # FLD: 23.7 % — HIGH (ref 11.5–14.5)
SODIUM SERPL-SCNC: 134 MMOL/L — LOW (ref 135–146)
WBC # BLD: 35.68 K/UL — HIGH (ref 4.8–10.8)
WBC # FLD AUTO: 35.68 K/UL — HIGH (ref 4.8–10.8)

## 2018-07-21 RX ORDER — LANOLIN ALCOHOL/MO/W.PET/CERES
3 CREAM (GRAM) TOPICAL ONCE
Qty: 0 | Refills: 0 | Status: COMPLETED | OUTPATIENT
Start: 2018-07-21 | End: 2018-07-21

## 2018-07-21 RX ADMIN — MEROPENEM 100 MILLIGRAM(S): 1 INJECTION INTRAVENOUS at 17:05

## 2018-07-21 RX ADMIN — MEROPENEM 100 MILLIGRAM(S): 1 INJECTION INTRAVENOUS at 05:36

## 2018-07-21 RX ADMIN — Medication 1 TABLET(S): at 11:18

## 2018-07-21 RX ADMIN — Medication 3 MILLIGRAM(S): at 21:07

## 2018-07-21 NOTE — PROGRESS NOTE ADULT - SUBJECTIVE AND OBJECTIVE BOX
RN called to evaluate Shelley because pt has low urine output, pt seen and examined at bedside, Shelley draining tea colored urine, no sediment, no clots, abd + anasarca, no bladder tenderness. No need to irrigate Shelley at this time, draining well, recall prn

## 2018-07-21 NOTE — DIETITIAN INITIAL EVALUATION ADULT. - PHYSICAL APPEARANCE
overweight/BMI 27.3, alert&oriented, skin: ecchymosis, wound (BS 18), observed severe temporal muscle wasting on a limited physical exam as pt. c/o feeling exhousted and no consent for full physical exam, will monitor of more physical signs of malnutrition at follow up.

## 2018-07-21 NOTE — PROGRESS NOTE ADULT - SUBJECTIVE AND OBJECTIVE BOX
Patient is a 78y old  Female who presents with a chief complaint of weakness (16 Jul 2018 23:57)      Subjective: no issues overnight .       Vital Signs Last 24 Hrs  T(C): 35.9 (21 Jul 2018 05:22), Max: 36.2 (20 Jul 2018 20:55)  T(F): 96.7 (21 Jul 2018 05:22), Max: 97.1 (20 Jul 2018 20:55)  HR: 82 (21 Jul 2018 05:22) (82 - 91)  BP: 133/59 (21 Jul 2018 05:22) (111/52 - 133/59)  BP(mean): --  RR: 18 (21 Jul 2018 05:22) (18 - 18)  SpO2: 93% (20 Jul 2018 22:01) (93% - 93%)    PHYSICAL EXAM  General: cachectic, jaudiced  HEENT: clear oropharynx, icteric sclera, pink conjunctiva  Neck: supple  CV: normal S1/S2 with no murmur rubs or gallops  Lungs: positive air movement b/l ant lungs,clear to auscultation, no wheezes, no rales  Abdomen: soft non-tender non-distended   Ext: no clubbing cyanosis or edema  Skin: no rashes and no petechiae  Neuro: alert and oriented X 3, no focal deficits    MEDICATIONS  (STANDING):  calcium carbonate    500 mG (Tums) Chewable 1 Tablet(s) Chew daily  meropenem  IVPB      meropenem  IVPB 1000 milliGRAM(s) IV Intermittent every 12 hours  potassium chloride  20 mEq/100 mL IVPB 20 milliEquivalent(s) IV Intermittent every 2 hours    MEDICATIONS  (PRN):      LABS:                            8.2    23.71 )-----------( 19       ( 20 Jul 2018 06:43 )             22.8   07-20    136  |  97<L>  |  13  ----------------------------<  127<H>  3.5   |  26  |  0.7    Ca    6.2<L>      20 Jul 2018 06:43    TPro  3.7<L>  /  Alb  2.2<L>  /  TBili  7.1<H>  /  DBili  x   /  AST  29  /  ALT  29  /  AlkPhos  180<H>  07-20                          8.1    19.06 )-----------( 23       ( 19 Jul 2018 06:46 )             22.0         Mean Cell Volume : 76.1 fL  Mean Cell Hemoglobin : 28.0 pg  Mean Cell Hemoglobin Concentration : 36.8 g/dL  Auto Neutrophil # : 6.58 K/uL  Auto Lymphocyte # : 3.70 K/uL  Auto Monocyte # : 3.60 K/uL  Auto Eosinophil # : 0.03 K/uL  Auto Basophil # : 0.03 K/uL  Auto Neutrophil % : 34.4 %  Auto Lymphocyte % : 19.4 %  Auto Monocyte % : 18.9 %  Auto Eosinophil % : 0.2 %  Auto Basophil % : 0.2 %      Serial CBC's  07-19 @ 06:46  Hct-22.0 / Hgb-8.1 / Plat-23 / RBC-2.89 / WBC-19.06  Serial CBC's  07-18 @ 21:18  Hct-22.6 / Hgb-8.3 / Plat-35 / RBC-2.96 / WBC-20.77  Serial CBC's  07-18 @ 07:08  Hct-22.5 / Hgb-8.3 / Plat-34 / RBC-2.97 / WBC-20.67  Serial CBC's  07-17 @ 17:49  Hct-23.6 / Hgb-8.7 / Plat-62 / RBC-3.12 / WBC-23.49  Serial CBC's  07-17 @ 08:02  Hct-24.7 / Hgb-9.1 / Plat-16 / RBC-3.30 / WBC-25.33  Serial CBC's  07-16 @ 17:05  Hct-20.6 / Hgb-7.5 / Plat-30 / RBC-2.71 / WBC-37.94      07-19    137  |  99  |  16  ----------------------------<  105<H>  3.1<L>   |  26  |  0.8    Ca    6.0<L>      19 Jul 2018 06:46  Mg     1.7     07-19    TPro  3.5<L>  /  Alb  2.0<L>  /  TBili  7.6<H>  /  DBili  5.9<H>  /  AST  31  /  ALT  30  /  AlkPhos  176<H>  07-19      PT/INR - ( 19 Jul 2018 06:46 )   PT: 19.60 sec;   INR: 1.83 ratio         PTT - ( 19 Jul 2018 06:46 )  PTT:35.1 sec    Iron - Total Binding Capacity.: <106 ug/dL (07-17 @ 08:02)  Ferritin, Serum: 8729 ng/mL (07-17 @ 08:02)    Culture - Urine (collected 17 Jul 2018 23:00)  Source: .Urine Clean Catch (Midstream)  Final Report (19 Jul 2018 05:40):    No growth    Culture - Blood (collected 17 Jul 2018 17:49)  Source: .Blood None  Preliminary Report (19 Jul 2018 03:01):    No growth to date.    RADIOLOGY & ADDITIONAL STUDIES:  No new imaging Patient is a 78y old  Female who presents with a chief complaint of weakness (16 Jul 2018 23:57)      Subjective: no issues overnight. Feeling tired.       Vital Signs Last 24 Hrs  T(C): 35.9 (21 Jul 2018 05:22), Max: 36.2 (20 Jul 2018 20:55)  T(F): 96.7 (21 Jul 2018 05:22), Max: 97.1 (20 Jul 2018 20:55)  HR: 82 (21 Jul 2018 05:22) (82 - 91)  BP: 133/59 (21 Jul 2018 05:22) (111/52 - 133/59)  BP(mean): --  RR: 18 (21 Jul 2018 05:22) (18 - 18)  SpO2: 93% (20 Jul 2018 22:01) (93% - 93%)    PHYSICAL EXAM  General: cachectic, jaudiced  HEENT: clear oropharynx, icteric sclera, pink conjunctiva  Neck: supple  CV: normal S1/S2 with no murmur rubs or gallops  Lungs: positive air movement b/l ant lungs,clear to auscultation, no wheezes, no rales  Abdomen: soft non-tender non-distended   Ext: no clubbing cyanosis or edema  Skin: no rashes and no petechiae  Neuro: alert and oriented X 3, no focal deficits    MEDICATIONS  (STANDING):  calcium carbonate    500 mG (Tums) Chewable 1 Tablet(s) Chew daily  meropenem  IVPB      meropenem  IVPB 1000 milliGRAM(s) IV Intermittent every 12 hours  potassium chloride  20 mEq/100 mL IVPB 20 milliEquivalent(s) IV Intermittent every 2 hours    MEDICATIONS  (PRN):      LABS:                            8.2    23.71 )-----------( 19       ( 20 Jul 2018 06:43 )             22.8   07-20    136  |  97<L>  |  13  ----------------------------<  127<H>  3.5   |  26  |  0.7    Ca    6.2<L>      20 Jul 2018 06:43    TPro  3.7<L>  /  Alb  2.2<L>  /  TBili  7.1<H>  /  DBili  x   /  AST  29  /  ALT  29  /  AlkPhos  180<H>  07-20                          8.1    19.06 )-----------( 23       ( 19 Jul 2018 06:46 )             22.0         Mean Cell Volume : 76.1 fL  Mean Cell Hemoglobin : 28.0 pg  Mean Cell Hemoglobin Concentration : 36.8 g/dL  Auto Neutrophil # : 6.58 K/uL  Auto Lymphocyte # : 3.70 K/uL  Auto Monocyte # : 3.60 K/uL  Auto Eosinophil # : 0.03 K/uL  Auto Basophil # : 0.03 K/uL  Auto Neutrophil % : 34.4 %  Auto Lymphocyte % : 19.4 %  Auto Monocyte % : 18.9 %  Auto Eosinophil % : 0.2 %  Auto Basophil % : 0.2 %      Serial CBC's  07-19 @ 06:46  Hct-22.0 / Hgb-8.1 / Plat-23 / RBC-2.89 / WBC-19.06  Serial CBC's  07-18 @ 21:18  Hct-22.6 / Hgb-8.3 / Plat-35 / RBC-2.96 / WBC-20.77  Serial CBC's  07-18 @ 07:08  Hct-22.5 / Hgb-8.3 / Plat-34 / RBC-2.97 / WBC-20.67  Serial CBC's  07-17 @ 17:49  Hct-23.6 / Hgb-8.7 / Plat-62 / RBC-3.12 / WBC-23.49  Serial CBC's  07-17 @ 08:02  Hct-24.7 / Hgb-9.1 / Plat-16 / RBC-3.30 / WBC-25.33  Serial CBC's  07-16 @ 17:05  Hct-20.6 / Hgb-7.5 / Plat-30 / RBC-2.71 / WBC-37.94      07-19    137  |  99  |  16  ----------------------------<  105<H>  3.1<L>   |  26  |  0.8    Ca    6.0<L>      19 Jul 2018 06:46  Mg     1.7     07-19    TPro  3.5<L>  /  Alb  2.0<L>  /  TBili  7.6<H>  /  DBili  5.9<H>  /  AST  31  /  ALT  30  /  AlkPhos  176<H>  07-19      PT/INR - ( 19 Jul 2018 06:46 )   PT: 19.60 sec;   INR: 1.83 ratio         PTT - ( 19 Jul 2018 06:46 )  PTT:35.1 sec    Iron - Total Binding Capacity.: <106 ug/dL (07-17 @ 08:02)  Ferritin, Serum: 8729 ng/mL (07-17 @ 08:02)    Culture - Urine (collected 17 Jul 2018 23:00)  Source: .Urine Clean Catch (Midstream)  Final Report (19 Jul 2018 05:40):    No growth    Culture - Blood (collected 17 Jul 2018 17:49)  Source: .Blood None  Preliminary Report (19 Jul 2018 03:01):    No growth to date.    RADIOLOGY & ADDITIONAL STUDIES:  No new imaging

## 2018-07-21 NOTE — DIETITIAN INITIAL EVALUATION ADULT. - OTHER INFO
Initial assessment for poor PO p/w: ELEVATED BILIRUBIN / DIRECT BILIRUBIN > INDIRECT / ELEVATED ALP/ELEVATED INR / LOW PLATELETS / ELEVATED LDH:  Liver injury secondary to Drug toxicity, no signs of obstruction. Sepsis: Blood Cx - Negative.  Will consider repeat bone marrow to rule out leukemic transformation or Palliative in light of the advanced disease with limited treatment options. Dispo: PT eval.

## 2018-07-21 NOTE — PROGRESS NOTE ADULT - ASSESSMENT
79 y/o F with CMML p/w hypotension, worsening fatigue and  jaundice, and anemia    1. Jaundice with elevated direct bilirubin , normal liver enzymes and Alk phos, prolonged INR/PT  - No evidence of obstruction on CT abdomen and US abdomen : Nodular contour of liver suggestive of liver disease . + moderate ascites  - Peripheral smear did not show any schistocytes   - hepatitis panel negative , CLD workup pending  - R/O drug related cholestasis vs leukemic infiltration - Patient may need liver biopsy eventually if workup negative or paracenthesis once INR low  - MRCP- Cirrhosis and splenomegaly. No biliary dilatation  - Continue with meropenem for now    2. Elevated INR - coagulopathy with no bleeding probably secondary to liver dysfunction +/_ DIC   - s/p 2 dose of vitamin K po - INR <2 now - follow daily INR  - DIC panel showed a low fibrinogen , elevated D dimer .   - Transfuse blood products for active bleeding or invasive procedures.     3. CMML with persistent leucocytosis: On vidaza. last dose ( last cycle June 4)  - Will consider repeat bone marrow to rule out leukemic transformation or Palliative in light of the advanced disease with limited treatment options  - Keep hb >7 and PLt >[id number]  - Will discuss prognosis with daughter and patient today     4. C/W meds for other co-morbidities.    5. DVT/GI ppx: Sequentials for now    Plan : Follow daily labs            Goals of care 77 y/o F with CMML p/w hypotension, worsening fatigue and  jaundice, and anemia    1. Jaundice with elevated direct bilirubin , normal liver enzymes and Alk phos, prolonged INR/PT  - No evidence of obstruction on CT abdomen and US abdomen : Nodular contour of liver suggestive of liver disease . + moderate ascites  - Peripheral smear did not show any schistocytes   - hepatitis panel negative , CLD workup pending  - R/O drug related cholestasis vs leukemic infiltration - Patient may need liver biopsy eventually if workup negative or paracenthesis once INR low  - MRCP- Cirrhosis and splenomegaly. No biliary dilatation  - Continue with meropenem for now    2. Elevated INR - coagulopathy with no bleeding probably secondary to liver dysfunction +/_ DIC   - s/p 2 dose of vitamin K po - INR <2 now - follow daily INR  - DIC panel showed a low fibrinogen, elevated D dimer .   - Transfuse blood products for active bleeding or invasive procedures.     3. CMML with persistent leucocytosis: On vidaza. last dose ( last cycle June 4)  - Will consider repeat bone marrow to rule out leukemic transformation or Palliative in light of the advanced disease with limited treatment options  - Keep hb >7 and PLt >15,000K    4. C/W meds for other co-morbidities.    5. DVT/GI ppx: Sequentials for now    Plan : Follow cbc and cmp today. if platelets less than 15,000 then transfuse her 1 unit.             Goals of care

## 2018-07-21 NOTE — DIETITIAN INITIAL EVALUATION ADULT. - ORAL INTAKE PTA
fair/pt. states she's not "a big eater" but has 3 meals daily, likes fresh fruit for breakfast, soups for lunch and pasta with meat and sides for dinner, no vitamin supplement use, likes Ensure (vanilla and chocolate flavors) to supplement intake

## 2018-07-21 NOTE — PROGRESS NOTE ADULT - SUBJECTIVE AND OBJECTIVE BOX
GALA PHILLIPS, female, 78y ([birth year]),   [id number]  Admit Date: 07-16-18 (5d)        Chief Complaint:  Patient is a 78y old  Female who presents with a chief complaint of weakness (16 Jul 2018 23:57)      Interval History:  Pt feels ok after having discussion about prognosis last night. No active complaints over night. Looks to be in good spirit today. Plan for palliative care.    Past Medical and Surgical History:  PAST MEDICAL & SURGICAL HISTORY:  Thrombocytopenia  Chronic myelomonocytic leukemia not having achieved remission  Non-Hodgkins lymphoma  S/P total abdominal hysterectomy      Current Medications:  MEDICATIONS  (STANDING):  calcium carbonate    500 mG (Tums) Chewable 1 Tablet(s) Chew daily  meropenem  IVPB      meropenem  IVPB 1000 milliGRAM(s) IV Intermittent every 12 hours    MEDICATIONS  (PRN):        Vital Signs:  T(F): 96.7 (07-21-18 @ 05:22), Max: 98.6 (07-19-18 @ 21:21)  HR: 82 (07-21-18 @ 05:22) (78 - 91)  BP: 133/59 (07-21-18 @ 05:22) (101/53 - 133/59)  RR: 18 (07-21-18 @ 05:22) (18 - 18)  SpO2: 94% (07-21-18 @ 09:02) (93% - 95%)  CAPILLARY BLOOD GLUCOSE          Physical Exam:  General: Not in distress.   HEENT: Moist mucus membranes. PERRLA.  Cardio: Regular rate and rhythm, S1, S2, no murmur, rub, or gallop.  Pulm: Clear to auscultation bilaterally. No wheezing, rales, or rhonchi.  Abdomen:Soft, non-tender, +BS. No guarding. mild-moderate distension seconadry to ascites  Extremities: No cyanosis or edema bilaterally. No calf tenderness to palpation.  Neuro: A&O x3. No focal deficits. CN II - XII grossly intact.    Labs and Imaging:  CBC Full  -  ( 21 Jul 2018 10:17 )  WBC Count : 35.68 K/uL  Hemoglobin : 8.8 g/dL  Hematocrit : 24.5 %  Platelet Count - Automated : 22 K/uL  Mean Cell Volume : 78.0 fL  Mean Cell Hemoglobin : 28.0 pg  Mean Cell Hemoglobin Concentration : 35.9 g/dL  Auto Neutrophil # : 9.51 K/uL  Auto Lymphocyte # : 5.92 K/uL  Auto Monocyte # : 8.26 K/uL  Auto Eosinophil # : 0.04 K/uL  Auto Basophil # : 0.12 K/uL  Auto Neutrophil % : 26.6 %  Auto Lymphocyte % : 16.6 %  Auto Monocyte % : 23.2 %  Auto Eosinophil % : 0.1 %  Auto Basophil % : 0.3 %    RDW: 23.7        LFTs: 07-20-18 @ 06:43  TP  3.7  | 2.2 Alb   ---------------  TB  7.1  | --  DB   ---------------  ALT 29  | 29  AST            ^          180 ALK               (collected 07-17-18 @ 23:00)  Source: .Urine Clean Catch (Midstream)  Final Report:    No growth     (collected 07-17-18 @ 17:49)  Source: .Blood None  Preliminary Report:    No growth to date.      < from: MR Abdomen No Cont (07.20.18 @ 12:59) >  1. Unchanged abdominopelvic ascites, soft tissue anasarca and bilateral   pleural effusions.  2. Diffuse hepatic steatosis with nodular liver contour suggesting   cirrhosis.  3. Indeterminate multifocal bilobar T2 hyperintense foci scattered   throughout the liver.  4. Splenomegaly, essentially unchanged.    < end of copied text >      Home Medications:  Home Medications:

## 2018-07-21 NOTE — DIETITIAN INITIAL EVALUATION ADULT. - NS FNS WEIGHT CHANGE REASON
pt. states she's noticed some weight loss, but unable to provide quantity or timeframe. States UBW lower than current weight, unsure if CBW documented is correct as pt. with edema, will monitor wt trends during LOS/unintentional

## 2018-07-21 NOTE — PROGRESS NOTE ADULT - ASSESSMENT
# ELEVATED BILIRUBIN / DIRECT BILIRUBIN > INDIRECT / ELEVATED ALP/ELEVATED INR / LOW PLATELETS / ELEVATED LDH / NORMAL AST / ALT / NORMAL ALK PHOSPHATASE  - Liver injury secondary to Drug toxicity - Vidaza, vs Chronic Liver disease vs less likely obstruction as no CBD dilatation, no AST/ALT/ALP elevation  -CT abdo w/IV (07/17): No evidence of obstruction. nodular liver contour and moderate ascites   -MRCP: Diffuse hepatic steatosis w/ nodular liver contour suggesting cirrhosis. No signs of obstruction.  -INR 3.86 --> now 1.83.   - -hep panel -ve      #Sepsis  Blood Cx - Negative  D?C'ed vanco; keep meropenem as per Dr. Jackson    CML w/ persistent leukocytosis: on vidaza, last cycle June 4th.  -Vidaza stopped; no plan to restart it by Dr. Jackson as pt is not responding  - Will consider repeat bone marrow to rule out leukemic transformation or Palliative in light of the advanced disease with limited treatment options  - Keep hb >7 and PLt >15,000K. keep active Type&screen  -DIC panel: fibrinogen 152, D-Dimer 3202. Heparin stopped  	-FFP only if active pleeding or if procedure is required.   -F/U Factor V, VIII    -Anemia  Hemolysis workup: LDH 1153 Ferritin 8729 TIBC <107  -No schistocytes     Hypocalcemia  -6.1 (7.7 after correction for albumin)--> Ca carbonate 500mg oral daily  -F/U 1,25-vit D    #Hypokalemia          #Signs of hypervolemia, possibly 2/2 hypoalbuminemia:  - minimal left costophrenic blunting on CXR, 2+ pitting edema of LE, ascites  - monitor fluid status carefully while on IVF    #DVT ppx:  - Heparin SubQ    #Dispo:  - will get PT eval since patient has been increasingly weak and lives alone

## 2018-07-22 RX ORDER — PANTOPRAZOLE SODIUM 20 MG/1
20 TABLET, DELAYED RELEASE ORAL
Qty: 0 | Refills: 0 | Status: DISCONTINUED | OUTPATIENT
Start: 2018-07-22 | End: 2018-07-22

## 2018-07-22 RX ORDER — PANTOPRAZOLE SODIUM 20 MG/1
40 TABLET, DELAYED RELEASE ORAL DAILY
Qty: 0 | Refills: 0 | Status: DISCONTINUED | OUTPATIENT
Start: 2018-07-22 | End: 2018-07-24

## 2018-07-22 RX ADMIN — MEROPENEM 100 MILLIGRAM(S): 1 INJECTION INTRAVENOUS at 05:24

## 2018-07-22 RX ADMIN — PANTOPRAZOLE SODIUM 40 MILLIGRAM(S): 20 TABLET, DELAYED RELEASE ORAL at 22:53

## 2018-07-22 RX ADMIN — MEROPENEM 100 MILLIGRAM(S): 1 INJECTION INTRAVENOUS at 17:14

## 2018-07-22 RX ADMIN — Medication 1 TABLET(S): at 11:21

## 2018-07-22 NOTE — PROGRESS NOTE ADULT - SUBJECTIVE AND OBJECTIVE BOX
Patient is a 78y old  Female who presents with a chief complaint of weakness (16 Jul 2018 23:57)      Subjective: no issues overnight. Feeling tired.       Vital Signs Last 24 Hrs  T(C): 35.7 (22 Jul 2018 05:26), Max: 36.8 (21 Jul 2018 20:21)  T(F): 96.3 (22 Jul 2018 05:26), Max: 98.2 (21 Jul 2018 20:21)  HR: 85 (22 Jul 2018 05:26) (85 - 92)  BP: 104/51 (22 Jul 2018 05:26) (104/51 - 110/51)  BP(mean): --  RR: 19 (22 Jul 2018 05:26) (18 - 19)  SpO2: 93% (22 Jul 2018 07:43) (93% - 96%))    PHYSICAL EXAM  General: cachectic, jaudiced  HEENT: clear oropharynx, icteric sclera, pink conjunctiva  Neck: supple  CV: normal S1/S2 with no murmur rubs or gallops  Lungs: positive air movement b/l ant lungs,clear to auscultation, no wheezes, no rales  Abdomen: soft non-tender non-distended   Ext: no clubbing cyanosis or edema  Skin: no rashes and no petechiae  Neuro: alert and oriented X 3, no focal deficits    MEDICATIONS  (STANDING):  calcium carbonate    500 mG (Tums) Chewable 1 Tablet(s) Chew daily  meropenem  IVPB      meropenem  IVPB 1000 milliGRAM(s) IV Intermittent every 12 hours  potassium chloride  20 mEq/100 mL IVPB 20 milliEquivalent(s) IV Intermittent every 2 hours    MEDICATIONS  (PRN):      LABS:                            8.2    23.71 )-----------( 19       ( 20 Jul 2018 06:43 )             22.8   07-20    136  |  97<L>  |  13  ----------------------------<  127<H>  3.5   |  26  |  0.7    Ca    6.2<L>      20 Jul 2018 06:43    TPro  3.7<L>  /  Alb  2.2<L>  /  TBili  7.1<H>  /  DBili  x   /  AST  29  /  ALT  29  /  AlkPhos  180<H>  07-20                          8.1    19.06 )-----------( 23       ( 19 Jul 2018 06:46 )             22.0         Mean Cell Volume : 76.1 fL  Mean Cell Hemoglobin : 28.0 pg  Mean Cell Hemoglobin Concentration : 36.8 g/dL  Auto Neutrophil # : 6.58 K/uL  Auto Lymphocyte # : 3.70 K/uL  Auto Monocyte # : 3.60 K/uL  Auto Eosinophil # : 0.03 K/uL  Auto Basophil # : 0.03 K/uL  Auto Neutrophil % : 34.4 %  Auto Lymphocyte % : 19.4 %  Auto Monocyte % : 18.9 %  Auto Eosinophil % : 0.2 %  Auto Basophil % : 0.2 %      Serial CBC's  07-19 @ 06:46  Hct-22.0 / Hgb-8.1 / Plat-23 / RBC-2.89 / WBC-19.06  Serial CBC's  07-18 @ 21:18  Hct-22.6 / Hgb-8.3 / Plat-35 / RBC-2.96 / WBC-20.77  Serial CBC's  07-18 @ 07:08  Hct-22.5 / Hgb-8.3 / Plat-34 / RBC-2.97 / WBC-20.67  Serial CBC's  07-17 @ 17:49  Hct-23.6 / Hgb-8.7 / Plat-62 / RBC-3.12 / WBC-23.49  Serial CBC's  07-17 @ 08:02  Hct-24.7 / Hgb-9.1 / Plat-16 / RBC-3.30 / WBC-25.33  Serial CBC's  07-16 @ 17:05  Hct-20.6 / Hgb-7.5 / Plat-30 / RBC-2.71 / WBC-37.94      07-19    137  |  99  |  16  ----------------------------<  105<H>  3.1<L>   |  26  |  0.8    Ca    6.0<L>      19 Jul 2018 06:46  Mg     1.7     07-19    TPro  3.5<L>  /  Alb  2.0<L>  /  TBili  7.6<H>  /  DBili  5.9<H>  /  AST  31  /  ALT  30  /  AlkPhos  176<H>  07-19      PT/INR - ( 19 Jul 2018 06:46 )   PT: 19.60 sec;   INR: 1.83 ratio         PTT - ( 19 Jul 2018 06:46 )  PTT:35.1 sec    Iron - Total Binding Capacity.: <106 ug/dL (07-17 @ 08:02)  Ferritin, Serum: 8729 ng/mL (07-17 @ 08:02)    Culture - Urine (collected 17 Jul 2018 23:00)  Source: .Urine Clean Catch (Midstream)  Final Report (19 Jul 2018 05:40):    No growth    Culture - Blood (collected 17 Jul 2018 17:49)  Source: .Blood None  Preliminary Report (19 Jul 2018 03:01):    No growth to date.    RADIOLOGY & ADDITIONAL STUDIES:  No new imaging Patient is a 78y old  Female who presents with a chief complaint of weakness (16 Jul 2018 23:57)      Subjective: more lethargic today. Her urine is tea coloured but maintaining adequate urine output.   Her LFTS are rising and her INR bumped up to 2.1    Vital Signs Last 24 Hrs  T(C): 35.7 (22 Jul 2018 05:26), Max: 36.8 (21 Jul 2018 20:21)  T(F): 96.3 (22 Jul 2018 05:26), Max: 98.2 (21 Jul 2018 20:21)  HR: 85 (22 Jul 2018 05:26) (85 - 92)  BP: 104/51 (22 Jul 2018 05:26) (104/51 - 110/51)  BP(mean): --  RR: 19 (22 Jul 2018 05:26) (18 - 19)  SpO2: 93% (22 Jul 2018 07:43) (93% - 96%))    PHYSICAL EXAM  General: cachectic, jaudiced  HEENT: clear oropharynx, icteric sclera, pink conjunctiva  Neck: supple  CV: normal S1/S2 with no murmur rubs or gallops  Lungs: positive air movement b/l ant lungs,clear to auscultation, no wheezes, no rales  Abdomen: soft non-tender non-distended   Ext: no clubbing cyanosis or edema  Skin: no rashes and no petechiae  Neuro: alert and oriented X 2, no focal deficits    MEDICATIONS  (STANDING):  calcium carbonate    500 mG (Tums) Chewable 1 Tablet(s) Chew daily  meropenem  IVPB      meropenem  IVPB 1000 milliGRAM(s) IV Intermittent every 12 hours  potassium chloride  20 mEq/100 mL IVPB 20 milliEquivalent(s) IV Intermittent every 2 hours    MEDICATIONS  (PRN):      LABS:                          8.8    35.68 )-----------( 22       ( 21 Jul 2018 10:17 )             24.5   07-21    134<L>  |  94<L>  |  12  ----------------------------<  125<H>  3.8   |  25  |  0.9    Ca    6.8<L>      21 Jul 2018 10:17    TPro  4.0<L>  /  Alb  2.2<L>  /  TBili  7.5<H>  /  DBili  x   /  AST  39  /  ALT  32  /  AlkPhos  214<H>  07-21                          8.2    23.71 )-----------( 19 ( 20 Jul 2018 06:43 )             22.8   07-20    136  |  97<L>  |  13  ----------------------------<  127<H>  3.5   |  26  |  0.7    Ca    6.2<L>      20 Jul 2018 06:43    TPro  3.7<L>  /  Alb  2.2<L>  /  TBili  7.1<H>  /  DBili  x   /  AST  29  /  ALT  29  /  AlkPhos  180<H>  07-20                          8.1    19.06 )-----------( 23 ( 19 Jul 2018 06:46 )             22.0         Mean Cell Volume : 76.1 fL  Mean Cell Hemoglobin : 28.0 pg  Mean Cell Hemoglobin Concentration : 36.8 g/dL  Auto Neutrophil # : 6.58 K/uL  Auto Lymphocyte # : 3.70 K/uL  Auto Monocyte # : 3.60 K/uL  Auto Eosinophil # : 0.03 K/uL  Auto Basophil # : 0.03 K/uL  Auto Neutrophil % : 34.4 %  Auto Lymphocyte % : 19.4 %  Auto Monocyte % : 18.9 %  Auto Eosinophil % : 0.2 %  Auto Basophil % : 0.2 %      Serial CBC's  07-19 @ 06:46  Hct-22.0 / Hgb-8.1 / Plat-23 / RBC-2.89 / WBC-19.06  Serial CBC's  07-18 @ 21:18  Hct-22.6 / Hgb-8.3 / Plat-35 / RBC-2.96 / WBC-20.77  Serial CBC's  07-18 @ 07:08  Hct-22.5 / Hgb-8.3 / Plat-34 / RBC-2.97 / WBC-20.67  Serial CBC's  07-17 @ 17:49  Hct-23.6 / Hgb-8.7 / Plat-62 / RBC-3.12 / WBC-23.49  Serial CBC's  07-17 @ 08:02  Hct-24.7 / Hgb-9.1 / Plat-16 / RBC-3.30 / WBC-25.33  Serial CBC's  07-16 @ 17:05  Hct-20.6 / Hgb-7.5 / Plat-30 / RBC-2.71 / WBC-37.94      07-19    137  |  99  |  16  ----------------------------<  105<H>  3.1<L>   |  26  |  0.8    Ca    6.0<L>      19 Jul 2018 06:46  Mg     1.7     07-19    TPro  3.5<L>  /  Alb  2.0<L>  /  TBili  7.6<H>  /  DBili  5.9<H>  /  AST  31  /  ALT  30  /  AlkPhos  176<H>  07-19      PT/INR - ( 19 Jul 2018 06:46 )   PT: 19.60 sec;   INR: 1.83 ratio         PTT - ( 19 Jul 2018 06:46 )  PTT:35.1 sec    Iron - Total Binding Capacity.: <106 ug/dL (07-17 @ 08:02)  Ferritin, Serum: 8729 ng/mL (07-17 @ 08:02)    Culture - Urine (collected 17 Jul 2018 23:00)  Source: .Urine Clean Catch (Midstream)  Final Report (19 Jul 2018 05:40):    No growth    Culture - Blood (collected 17 Jul 2018 17:49)  Source: .Blood None  Preliminary Report (19 Jul 2018 03:01):    No growth to date.    RADIOLOGY & ADDITIONAL STUDIES:  No new imaging

## 2018-07-22 NOTE — PROGRESS NOTE ADULT - ASSESSMENT
79 y/o F with CMML p/w hypotension, worsening fatigue and  jaundice, and anemia    1. Jaundice with elevated direct bilirubin , normal liver enzymes and Alk phos, prolonged INR/PT  - No evidence of obstruction on CT abdomen and US abdomen : Nodular contour of liver suggestive of liver disease . + moderate ascites  - Peripheral smear did not show any schistocytes   - hepatitis panel negative , CLD workup pending  - R/O drug related cholestasis vs leukemic infiltration - Patient may need liver biopsy eventually if workup negative or paracenthesis once INR low  - MRCP- Cirrhosis and splenomegaly. No biliary dilatation  - Continue with meropenem for now    2. Elevated INR - coagulopathy with no bleeding probably secondary to liver dysfunction +/_ DIC   - s/p 2 dose of vitamin K po - INR <2 now - follow daily INR  - DIC panel showed a low fibrinogen, elevated D dimer .   - Transfuse blood products for active bleeding or invasive procedures.     3. CMML with persistent leucocytosis: On vidaza. last dose ( last cycle June 4)  - Will consider repeat bone marrow to rule out leukemic transformation or Palliative in light of the advanced disease with limited treatment options  - Keep hb >7 and PLt >15,000K    4. C/W meds for other co-morbidities.    5. DVT/GI ppx: Sequentials for now    Plan : Follow cbc and cmp today. if platelets less than 15,000 then transfuse her 1 unit.             Goals of care 77 y/o F with CMML p/w hypotension, worsening fatigue and  jaundice, and anemia    1. Jaundice with elevated direct bilirubin , normal liver enzymes and Alk phos, prolonged INR/PT  - No evidence of obstruction on CT abdomen and US abdomen : Nodular contour of liver suggestive of liver disease . + moderate ascites  - Peripheral smear did not show any schistocytes   - hepatitis panel negative , CLD workup pending  - R/O drug related cholestasis vs leukemic infiltration - Patient may need liver biopsy eventually if workup negative or paracenthesis once INR low  - MRCP- Cirrhosis and splenomegaly. No biliary dilatation  - Continue with meropenem for now    2. Elevated INR - coagulopathy with no bleeding probably secondary to liver dysfunction +/_ DIC   - s/p 2 dose of vitamin K po - INR -2.1 now - will give another dose of po vitamin k  - DIC panel showed a low fibrinogen, elevated D dimer .   - Transfuse blood products for active bleeding or invasive procedures.     3. CMML with persistent leucocytosis: On vidaza. last dose ( last cycle June 4)  - Will consider repeat bone marrow to rule out leukemic transformation or Palliative in light of the advanced disease with limited treatment options  - Keep hb >7 and PLt >15,000K    4. C/W meds for other co-morbidities.    5. DVT/GI ppx: Sequentials for now      Plan : Follow cbc and cmp today. if platelets less than 15,000 then transfuse her 1 unit.             Goals of care discussion tomorrow. clinically worsening.             Monitor strict Input and output.             IV fluids as needed

## 2018-07-23 ENCOUNTER — APPOINTMENT (OUTPATIENT)
Dept: INFUSION THERAPY | Facility: CLINIC | Age: 78
End: 2018-07-23

## 2018-07-23 DIAGNOSIS — R17 UNSPECIFIED JAUNDICE: ICD-10-CM

## 2018-07-23 LAB
ALBUMIN SERPL ELPH-MCNC: 2.2 G/DL — LOW (ref 3.5–5.2)
ALP SERPL-CCNC: 215 U/L — HIGH (ref 30–115)
ALT FLD-CCNC: 25 U/L — SIGNIFICANT CHANGE UP (ref 0–41)
ANION GAP SERPL CALC-SCNC: 15 MMOL/L — HIGH (ref 7–14)
AST SERPL-CCNC: 50 U/L — HIGH (ref 0–41)
BASOPHILS # BLD AUTO: 0.08 K/UL — SIGNIFICANT CHANGE UP (ref 0–0.2)
BASOPHILS NFR BLD AUTO: 0.1 % — SIGNIFICANT CHANGE UP (ref 0–1)
BILIRUB SERPL-MCNC: 7.5 MG/DL — HIGH (ref 0.2–1.2)
BUN SERPL-MCNC: 19 MG/DL — SIGNIFICANT CHANGE UP (ref 10–20)
CALCIUM SERPL-MCNC: 6.7 MG/DL — LOW (ref 8.5–10.1)
CHLORIDE SERPL-SCNC: 93 MMOL/L — LOW (ref 98–110)
CO2 SERPL-SCNC: 24 MMOL/L — SIGNIFICANT CHANGE UP (ref 17–32)
CREAT SERPL-MCNC: 1.1 MG/DL — SIGNIFICANT CHANGE UP (ref 0.7–1.5)
CULTURE RESULTS: SIGNIFICANT CHANGE UP
EOSINOPHIL # BLD AUTO: 0.13 K/UL — SIGNIFICANT CHANGE UP (ref 0–0.7)
EOSINOPHIL NFR BLD AUTO: 0.2 % — SIGNIFICANT CHANGE UP (ref 0–8)
GLUCOSE SERPL-MCNC: 114 MG/DL — HIGH (ref 70–99)
HCT VFR BLD CALC: 22.3 % — LOW (ref 37–47)
HGB BLD-MCNC: 7.8 G/DL — LOW (ref 12–16)
IMM GRANULOCYTES NFR BLD AUTO: 47 % — HIGH (ref 0.1–0.3)
LYMPHOCYTES # BLD AUTO: 13.2 % — LOW (ref 20.5–51.1)
LYMPHOCYTES # BLD AUTO: 8.53 K/UL — HIGH (ref 1.2–3.4)
MCHC RBC-ENTMCNC: 28 PG — SIGNIFICANT CHANGE UP (ref 27–31)
MCHC RBC-ENTMCNC: 35 G/DL — SIGNIFICANT CHANGE UP (ref 32–37)
MCV RBC AUTO: 79.9 FL — LOW (ref 81–99)
MONOCYTES # BLD AUTO: 14.22 K/UL — HIGH (ref 0.1–0.6)
MONOCYTES NFR BLD AUTO: 22 % — HIGH (ref 1.7–9.3)
NEUTROPHILS # BLD AUTO: 11.21 K/UL — HIGH (ref 1.4–6.5)
NEUTROPHILS NFR BLD AUTO: 17.5 % — LOW (ref 42.2–75.2)
PLATELET # BLD AUTO: 33 K/UL — LOW (ref 130–400)
POTASSIUM SERPL-MCNC: 4.2 MMOL/L — SIGNIFICANT CHANGE UP (ref 3.5–5)
POTASSIUM SERPL-SCNC: 4.2 MMOL/L — SIGNIFICANT CHANGE UP (ref 3.5–5)
PROT SERPL-MCNC: 3.6 G/DL — LOW (ref 6–8)
RBC # BLD: 2.79 M/UL — LOW (ref 4.2–5.4)
RBC # FLD: 25.7 % — HIGH (ref 11.5–14.5)
SODIUM SERPL-SCNC: 132 MMOL/L — LOW (ref 135–146)
SPECIMEN SOURCE: SIGNIFICANT CHANGE UP
WBC # BLD: 64.52 K/UL — CRITICAL HIGH (ref 4.8–10.8)
WBC # FLD AUTO: 64.52 K/UL — CRITICAL HIGH (ref 4.8–10.8)

## 2018-07-23 RX ADMIN — Medication 1 TABLET(S): at 12:46

## 2018-07-23 RX ADMIN — PANTOPRAZOLE SODIUM 40 MILLIGRAM(S): 20 TABLET, DELAYED RELEASE ORAL at 12:46

## 2018-07-23 RX ADMIN — MEROPENEM 100 MILLIGRAM(S): 1 INJECTION INTRAVENOUS at 17:25

## 2018-07-23 RX ADMIN — MEROPENEM 100 MILLIGRAM(S): 1 INJECTION INTRAVENOUS at 05:43

## 2018-07-23 NOTE — CONSULT NOTE ADULT - SUBJECTIVE AND OBJECTIVE BOX
REQUESTED OF: DR Valero    CLINICAL ISSUE TO BE EVALUATED BY CONSULTANT: Goals of care    This is a 77 y/o female with PMH of NHL diagnosed 5 years ago , thrombocytopenia and recently diagnosed CML this past October presented from Dr. Jackson's office for jaundice, hypotension, and anemia. Hospital stay complicated by anemia s/p 2 units,  leukocytosis with a left shift and monocytosis, hypokalemia, hypomagnasemia, hyperbilirubinemia, elevated INR, lactic acidosis, and ascites and hepatosplenomegaly on RUQ US from 7/10. -CT abdomen  w/IV (07/17): No evidence of obstruction. nodular liver contour and moderate ascites. Patient had 3 cycles of chemotherapy, but as per hem/onc chemotherapy was not effective and may need alternative therapy. At baseline, Patient lives at home alone and is able to independently perform her ADLs prior but has noted a decline in functional status. Palliative consult ordered for goals of care .         PAST MEDICAL & SURGICAL HISTORY:  Thrombocytopenia  Chronic myelomonocytic leukemia not having achieved remission  Non-Hodgkins lymphoma  S/P total abdominal hysterectomy        PHYSICAL EXAM:  General: well groomed/age appropriate female, alert/oriented/ NAD   HEENT: Moist mucus membranes. Scleral icterus  Cardio: S1, S2, RRR  Pulm: Clear to auscultation bilaterally. No wheezing, rales, or rhonchi.  Abdomen: Soft, non-tender, +BS.   Extremities: No cyanosis or edema bilaterally.             T(C): 35.7, Max: 36 (14:13)  HR: 88 (88 - 90)  BP: 102/49 (102/49 - 111/54)  RR: 18 (18 - 18)  SpO2: 91% (91% - 91%)      LABS/STUDIES:  07-23    132<L>  |  93<L>  |  19  ----------------------------<  114<H>  4.2   |  24  |  1.1    Ca    6.7<L>      23 Jul 2018 07:43    TPro  3.6<L>  /  Alb  2.2<L>  /  TBili  7.5<H>  /  DBili  x   /  AST  50<H>  /  ALT  25  /  AlkPhos  215<H>  07-23                            7.8    64.52 )-----------( 33       ( 23 Jul 2018 07:43 )             22.3       MEDICATIONS  (STANDING):  calcium carbonate    500 mG (Tums) Chewable 1 Tablet(s) Chew daily  meropenem  IVPB      meropenem  IVPB 1000 milliGRAM(s) IV Intermittent every 12 hours  pantoprazole   Suspension 40 milliGRAM(s) Oral daily    MEDICATIONS  (PRN):        [place] Symptom Assesment Scale      PPS (Palliative Performance Scale): REQUESTED OF: DR Valero    CLINICAL ISSUE TO BE EVALUATED BY CONSULTANT: Goals of care    This is a 77 y/o female with PMH of NHL diagnosed 5 years ago , thrombocytopenia and recently diagnosed CML this past October presented from Dr. Jackson's office for jaundice, hypotension, and anemia. Hospital stay complicated by anemia s/p 2 units,  leukocytosis with a left shift and monocytosis, hypokalemia, hypomagnasemia, hyperbilirubinemia, elevated INR, lactic acidosis, and ascites and hepatosplenomegaly on RUQ US from 7/10. -CT abdomen  w/IV (07/17): No evidence of obstruction. nodular liver contour and moderate ascites. Patient had 3 cycles of chemotherapy, but as per hem/onc chemotherapy was not effective and may need alternative therapy. At baseline, Patient lives at home alone and is able to independently perform her ADLs prior but has noted a decline in functional status. Palliative consult ordered for goals of care .         PAST MEDICAL & SURGICAL HISTORY:  Thrombocytopenia  Chronic myelomonocytic leukemia not having achieved remission  Non-Hodgkins lymphoma  S/P total abdominal hysterectomy        PHYSICAL EXAM:  General: well groomed/age appropriate female, alert/oriented/ NAD   HEENT: Moist mucus membranes. Scleral icterus  Cardio: S1, S2, RRR  Pulm: Clear to auscultation bilaterally. No wheezing, rales, or rhonchi.  Abdomen: Soft, non-tender, +BS.   Extremities: No cyanosis or edema bilaterally.             T(C): 35.7, Max: 36 (14:13)  HR: 88 (88 - 90)  BP: 102/49 (102/49 - 111/54)  RR: 18 (18 - 18)  SpO2: 91% (91% - 91%)      LABS/STUDIES:  07-23    132<L>  |  93<L>  |  19  ----------------------------<  114<H>  4.2   |  24  |  1.1    Ca    6.7<L>      23 Jul 2018 07:43    TPro  3.6<L>  /  Alb  2.2<L>  /  TBili  7.5<H>  /  DBili  x   /  AST  50<H>  /  ALT  25  /  AlkPhos  215<H>  07-23                            7.8    64.52 )-----------( 33       ( 23 Jul 2018 07:43 )             22.3       MEDICATIONS  (STANDING):  calcium carbonate    500 mG (Tums) Chewable 1 Tablet(s) Chew daily  meropenem  IVPB      meropenem  IVPB 1000 milliGRAM(s) IV Intermittent every 12 hours  pantoprazole   Suspension 40 milliGRAM(s) Oral daily    MEDICATIONS  (PRN):        [place] Symptom Assesment Scale      PPS (Palliative Performance Scale): 30

## 2018-07-23 NOTE — GOALS OF CARE CONVERSATION - PERSONAL ADVANCE DIRECTIVE - CONVERSATION DETAILS
Palliative team met with daughter today to discuss goals of care. Prognosis, disease progression and treatment options was discussed. Daughter is aware of patient's status, she was told by oncologist that chemotherapy will not be offered anymore. Patient has a living will and daughter will abide with patient's wishes. Daughter will have a conversation about DNI/DNR will patient because she is conflicted on what the living will stipulates. Daughter will consider hospice care at home.

## 2018-07-23 NOTE — PROGRESS NOTE ADULT - ASSESSMENT
77 y/o F with PMH of marginal zone lymphoma of face diagnosed 5 years ago , thrombocytopenia and recently diagnosed CML this past October presented from Dr. Jackson's office for jaundice, hypotension, and anemia.     # ELEVATED BILIRUBIN / DIRECT BILIRUBIN > INDIRECT / ELEVATED INR / LOW PLATELETS / ELEVATED LDH / NORMAL AST / ALT / ELEVATED ALK PHOSPHATASE    - Differential could be Liver injury sec to  Drug toxicity -VIDAZA  vs leukemic infiltration/vanishing duct sd vs less likely Hemolysis or Obstruction as no CBD dilatation , no elevation of AST/ALT and MRCP no obstruction   - Last blood work was 2 days ago   Plan is for palliative care given her poor prognosis  No repeat labs needed   recall as needed

## 2018-07-23 NOTE — PROGRESS NOTE ADULT - SUBJECTIVE AND OBJECTIVE BOX
GI HPI Today:  Patient feels well no complaints. had some heartburn at night and now feels better      Hospital course:  79 y/o F with PMH of NHL diagnosed 5 years ago and recently diagnosed CML this past October presented from Dr. Jackson's office for jaundice, hypotension, and anemia. Patient did not notice that she was jaundiced, but she did feel extremely weak. Patient lives at home alone and is able to independently perform her ADLs. She knows that when she feels this weak, she likely needs blood transfusion. However, during her last appt, she was not transfused because she was told her hemoglobin was not that low. Patient denied fever, chills, headache, dizziness, chest pain, SOB, n/v/d, or dysuria. Patient also reported that her legs were more swollen than usual because she has been walking less for the past 2 weeks due to weakness. Vitals in ED were T 97.3F, BP 80/44, HR 80, RR 16, SpO2 98% on room air. She was also found to have anemia, leukocytosis with a left shift and monocytosis, hypokalemia, hypomagnasemia, hyperbilirubinemia, elevated INR, lactic acidosis, and ascites and hepatosplenomegaly on RUQ US from 7/10. In ED, she received 2U PRBC, 250cc NS bolus with maintenance NS @ 75 cc/hr, 2gm Mag rider, and 80meq of potassium.    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 At that time noted to have thrombocytopenia, s/p Bone marrow biopsy for suspected bone marrow involvement. It was hypercellular, however flow from bone marrow was negative.  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 Jackson said it wasn't effective and she may need alternative therapy. (16 Jul 2018 20:41)      PAST MEDICAL & SURGICAL HISTORY  Thrombocytopenia  Chronic myelomonocytic leukemia not having achieved remission  Non-Hodgkins lymphoma  S/P total abdominal hysterectomy      ALLERGIES:  penicillin (Unknown)      MEDICATIONS:  MEDICATIONS  (STANDING):  calcium carbonate    500 mG (Tums) Chewable 1 Tablet(s) Chew daily  meropenem  IVPB      meropenem  IVPB 1000 milliGRAM(s) IV Intermittent every 12 hours  pantoprazole   Suspension 40 milliGRAM(s) Oral daily    MEDICATIONS  (PRN):      REVIEW OF SYSTEMS:    CONSTITUTIONAL: No weakness, fatigue  Throat: No Dysphagia, No Odynophagia  RESPIRATORY: No SOB  CARDIOVASCULAR: No chest pain   Muscloskeletal: no joints pain  NEUROLOGICAL: No syncope or diziness  SKIN: No pruritis         VITALS:   T(F): 96.3 (07-23 @ 05:30), Max: 98.5 (07-20 @ 03:37)  HR: 88 (07-23 @ 05:30) (78 - 92)  BP: 102/49 (07-23 @ 05:30) (102/49 - 133/59)  BP(mean): --  RR: 18 (07-23 @ 05:30) (18 - 19)  SpO2: 93% (07-22 @ 07:43) (93% - 96%)        PHYSICAL EXAM:  EYES: scleral icterus   LUNG: Clear to auscultation bilaterally; No rales, rhonchi, wheezing, or rubs  HEART: RRR; No murmurs  ABDOMEN: Soft, +BS, distended, no  Abdominal Tenderness, No guarding, No Lanza Sign   Rectal Exam: not performed     Blood Work :                        8.8    35.68 )-----------( 22       ( 21 Jul 2018 10:17 )             24.5     PT/INR - ( 21 Jul 2018 10:17 )  INR: 2.11            07-21    134<L>  |  94<L>  |  12  ----------------------------<  125<H>  3.8   |  25  |  0.9    Ca    6.8<L>      21 Jul 2018 10:17      CBC -  ( 21 Jul 2018 10:17 )  Hemoglobin : 8.8    CBC -  ( 20 Jul 2018 06:43 )  Hemoglobin : 8.2      LIVER FUNCTIONS - ( 21 Jul 2018 10:17 )  Alb: 2.2 [3.5 - 5.2] / Pro: 4.0 [6.0 - 8.0] / ALK PHOS: 214 [30 - 115] / ALT: 32 [0 - 41] / AST: 39 [0 - 41] / GGT: x     LIVER FUNCTIONS - ( 20 Jul 2018 06:43 )  Alb: 2.2 [3.5 - 5.2] / Pro: 3.7 [6.0 - 8.0] / ALK PHOS: 180 [30 - 115] / ALT: 29 [0 - 41] / AST: 29 [0 - 41] / GGT: x     LIVER FUNCTIONS - ( 19 Jul 2018 06:46 )  Alb: 2.0 [3.5 - 5.2] / Pro: 3.5 [6.0 - 8.0] / ALK PHOS: 176 [30 - 115] / ALT: 30 [0 - 41] / AST: 31 [0 - 41] / GGT: x     LIVER FUNCTIONS - ( 18 Jul 2018 21:18 )  Alb: 2.0 [3.5 - 5.2] / Pro: 3.5 [6.0 - 8.0] / ALK PHOS: 160 [30 - 115] / ALT: 29 [0 - 41] / AST: 32 [0 - 41] / GGT: x     LIVER FUNCTIONS - ( 18 Jul 2018 07:08 )  Alb: 2.2 [3.5 - 5.2] / Pro: 3.4 [6.0 - 8.0] / ALK PHOS: 138 [30 - 115] / ALT: 30 [0 - 41] / AST: 34 [0 - 41] / GGT: x     LIVER FUNCTIONS - ( 17 Jul 2018 08:02 )  Alb: 2.1 [3.5 - 5.2] / Pro: 3.5 [6.0 - 8.0] / ALK PHOS: 82 [30 - 115] / ALT: 31 [0 - 41] / AST: 35 [0 - 41] / GGT: x     LIVER FUNCTIONS - ( 16 Jul 2018 17:05 )  Alb: 2.4 [3.5 - 5.2] / Pro: 4.1 [6.0 - 8.0] / ALK PHOS: 94 [30 - 115] / ALT: 32 [0 - 41] / AST: 28 [0 - 41] / GGT: x         RADIOLOGY:  < from: MR Abdomen No Cont (07.20.18 @ 12:59) >  IMPRESSION:    Markedly limited exam secondary to motion artifact.    1. Unchanged abdominopelvic ascites, soft tissue anasarca and bilateral   pleural effusions.  2. Diffuse hepatic steatosis with nodular liver contour suggesting   cirrhosis.  3. Indeterminate multifocal bilobar T2 hyperintense foci scattered   throughout the liver.  4. Splenomegaly, essentially unchanged.    < end of copied text >

## 2018-07-23 NOTE — PROGRESS NOTE ADULT - SUBJECTIVE AND OBJECTIVE BOX
Patient is a 78y old  Female who presents with a chief complaint of weakness (16 Jul 2018 23:57)      Subjective: Patient has no complaints      Vital Signs Last 24 Hrs  T(C): 35.7 (23 Jul 2018 05:30), Max: 36 (22 Jul 2018 14:13)  T(F): 96.3 (23 Jul 2018 05:30), Max: 96.8 (22 Jul 2018 14:13)  HR: 88 (23 Jul 2018 05:30) (88 - 90)  BP: 102/49 (23 Jul 2018 05:30) (102/49 - 111/54)  BP(mean): --  RR: 18 (23 Jul 2018 05:30) (18 - 18)  SpO2: --    PHYSICAL EXAM  General: adult in NAD - weal  HEENT: clear oropharynx, icteric sclera, pink conjunctiva  Neck: supple  CV: normal S1/S2 with no murmur rubs or gallops  Lungs: positive air movement b/l ant lungs,clear to auscultation, no wheezes, no rales  Abdomen: distended , shifting dullness +  Ext: trace edema  Skin: no rashes and no petechiae  Neuro: alert and oriented X 4, no focal deficits    MEDICATIONS  (STANDING):  calcium carbonate    500 mG (Tums) Chewable 1 Tablet(s) Chew daily  meropenem  IVPB      meropenem  IVPB 1000 milliGRAM(s) IV Intermittent every 12 hours  pantoprazole   Suspension 40 milliGRAM(s) Oral daily    MEDICATIONS  (PRN):      LABS:                          8.8    35.68 )-----------( 22       ( 21 Jul 2018 10:17 )             24.5         Mean Cell Volume : 78.0 fL  Mean Cell Hemoglobin : 28.0 pg  Mean Cell Hemoglobin Concentration : 35.9 g/dL  Auto Neutrophil # : 9.51 K/uL  Auto Lymphocyte # : 5.92 K/uL  Auto Monocyte # : 8.26 K/uL  Auto Eosinophil # : 0.04 K/uL  Auto Basophil # : 0.12 K/uL  Auto Neutrophil % : 26.6 %  Auto Lymphocyte % : 16.6 %  Auto Monocyte % : 23.2 %  Auto Eosinophil % : 0.1 %  Auto Basophil % : 0.3 %      Serial CBC's  07-21 @ 10:17  Hct-24.5 / Hgb-8.8 / Plat-22 / RBC-3.14 / WBC-35.68  Serial CBC's  07-20 @ 06:43  Hct-22.8 / Hgb-8.2 / Plat-19 / RBC-2.94 / WBC-23.71      07-21    134<L>  |  94<L>  |  12  ----------------------------<  125<H>  3.8   |  25  |  0.9    Ca    6.8<L>      21 Jul 2018 10:17    TPro  4.0<L>  /  Alb  2.2<L>  /  TBili  7.5<H>  /  DBili  x   /  AST  39  /  ALT  32  /  AlkPhos  214<H>  07-21      PT/INR - ( 21 Jul 2018 10:17 )   PT: 22.60 sec;   INR: 2.11 ratio    Iron - Total Binding Capacity.: <106 ug/dL (07-17 @ 08:02)  Ferritin, Serum: 8729 ng/mL (07-17 @ 08:02)    RADIOLOGY & ADDITIONAL STUDIES:  < from: MR Abdomen No Cont (07.20.18 @ 12:59) >  Markedly limited exam secondary to motion artifact.    1. Unchanged abdominopelvic ascites, soft tissue anasarca and bilateral   pleural effusions.  2. Diffuse hepatic steatosis with nodular liver contour suggesting   cirrhosis.  3. Indeterminate multifocal bilobar T2 hyperintense foci scattered   throughout the liver.  4. Splenomegaly, essentially unchanged.    < end of copied text >

## 2018-07-23 NOTE — PROGRESS NOTE ADULT - ASSESSMENT
# ELEVATED BILIRUBIN / DIRECT BILIRUBIN > INDIRECT / ELEVATED ALP/ELEVATED INR / LOW PLATELETS / ELEVATED LDH / NORMAL AST / ALT / NORMAL ALK PHOSPHATASE  - Liver injury secondary to Drug toxicity - Vidaza, vs Chronic Liver disease vs less likely obstruction as no CBD dilatation, no AST/ALT/ALP elevation  -CT abdo w/IV (07/17): No evidence of obstruction. nodular liver contour and moderate ascites   -MRCP: Diffuse hepatic steatosis w/ nodular liver contour suggesting cirrhosis. No signs of obstruction.  -INR 3.86 --> now 1.83.   - -hep panel -ve      #Sepsis  Blood Cx - Negative  D?C'ed vanco; keep meropenem as per Dr. Jackson    CML w/ persistent leukocytosis: on vidaza, last cycle June 4th.  -Vidaza stopped; no plan to restart it by Dr. Jackson as pt is not responding.   -Pt clinically deteriorating, not doing well. T. bili rising.   -palliative conversation today: Pt and daughter advised on DNR/DNI. Daughter to make decision for pt, but needs to check her mother's living will. F/U Hospice consult. plan is likely to be for Home w/ Hospice care.  F/U CBC and CMP and transfuse if platelets <15.000. Monitor strict I&Os    - Keep hb >7 and PLt >15,000K. keep active Type&screen  -DIC panel: fibrinogen 152, D-Dimer 3202. Heparin stopped  	-FFP only if active pleeding or if procedure is required.   -F/U Factor V, VIII    -Anemia  Hemolysis workup: LDH 1153 Ferritin 8729 TIBC <107  -No schistocytes     Hypocalcemia  -6.1 (7.7 after correction for albumin)--> Ca carbonate 500mg oral daily  -F/U 1,25-vit D    #Hypokalemia          #Signs of hypervolemia, possibly 2/2 hypoalbuminemia:  - minimal left costophrenic blunting on CXR, 2+ pitting edema of LE, ascites  - monitor fluid status carefully while on IVF    #DVT ppx:  - Heparin SubQ    #Dispo:  - will get PT eval since patient has been increasingly weak and lives alone

## 2018-07-23 NOTE — PROGRESS NOTE ADULT - ATTENDING COMMENTS
Patient examined , no new complaints . blood work stable . awaiting MRCP .
Patient examined . GI consult appreciated , awaiting MRCP , possible paracentesis? , coagulopathy with no bleeding probably secondary to liver dysfunction +/_ DIC , no response to vitamin K . Transfuse blood products for active bleeding or invasive procedures. Will discuss further with patient and family prognosis and goals of care .
Patient examined , slightly encephalopathic . very weak , emaciated . still awaiting MRCP . if no evidence of reversible process will likely recommend palliative care only . Not candidate for leukemia directed therapy .
Patient examined, rising wbc, diffuse rash ( leukemia cutis ?) , discussed at length with patient and daughter . agrees to consider palliative care and hospice.
The patient was seen and examined. Agree with above.  Not doing well, clinically deteriorating. T.bili is rising.  Recommend Palliative care.
Patient examined , agree with above , feels slightly better today after transfusion .  , still jaundiced, cachectic , less edema. CT noted , nodular liver ( leukemic infiltration ? v/s drug induced cholestasis ) ) , elevated wbc with monocytosis more consistent with progressive CMML .  spleen reduced in size ( ? )  GI evaluation in progress , liver biopsy ? ( high INR and low platelets )  . consider repeat bone marrow to rule out leukemic transformation , Chemotherapy ( topotecan ? ) , will discuss all options with patient including palliative care only in light of the advanced disease with limited treatment options.

## 2018-07-23 NOTE — PROGRESS NOTE ADULT - ASSESSMENT
79 y/o F with CMML p/w hypotension, worsening fatigue and  jaundice, and anemia    1. Jaundice with elevated direct bilirubin , normal liver enzymes and Alk phos, prolonged INR/PT  - No evidence of obstruction on CT abdomen and US abdomen : Nodular contour of liver suggestive of liver disease . + moderate ascites  - Peripheral smear did not show any schistocytes   - hepatitis panel negative , CLD workup pending  - R/O drug related cholestasis vs leukemic infiltration - Patient may need liver biopsy eventually if workup negative or paracenthesis once INR low  - MRCP- Cirrhosis and splenomegaly. No biliary dilatation  - Continue with meropenem for now      2. Elevated INR - coagulopathy with no bleeding probably secondary to liver dysfunction +/_ DIC   - s/p 3 doses of vitamin K po - INR -2.1 now follow up INR  - DIC panel showed a low fibrinogen, elevated D dimer .   - Transfuse blood products only for active bleeding or invasive procedures.     3. CMML with persistent leucocytosis: On vidaza. last dose ( last cycle June 4)  - Prognosis  discussed with family and patient at bedside , explained that her condition is most likely related to progression of disease. She is ok to hold treatment and invasive procedures. She is open to palliative care consult , but is not ready to sign a DNR/DNI   - Keep hb >7 and PLt >15,000K    4. C/W meds for other co-morbidities.    5. DVT/GI ppx: Sequentials for now      Plan :  Palliative care consult            Labs today            Supportive care 77 y/o F with CMML p/w hypotension, worsening fatigue and  jaundice, and anemia    1. Jaundice with elevated direct bilirubin , normal liver enzymes and Alk phos, prolonged INR/PT  - No evidence of obstruction on CT abdomen and US abdomen : Nodular contour of liver suggestive of liver disease . + moderate ascites  - Peripheral smear did not show any schistocytes   - hepatitis panel negative , CLD workup pending  - R/O drug related cholestasis vs leukemic infiltration - Patient may need liver biopsy eventually if workup negative or paracenthesis once INR low  - MRCP- Cirrhosis and splenomegaly. No biliary dilatation  - Continue with meropenem for now      2. Elevated INR - coagulopathy with no bleeding probably secondary to liver dysfunction +/_ DIC   - s/p 3 doses of vitamin K po - INR -2.1 now follow up INR  - DIC panel showed a low fibrinogen, elevated D dimer .   - Transfuse blood products only for active bleeding or invasive procedures.     3. CMML with persistent leucocytosis: On vidaza. last dose ( last cycle June 4)  - Prognosis  discussed with family and patient at bedside , explained that her condition is most likely related to progression of disease. She is ok to hold treatment and invasive procedures. She is open to palliative care consult , but is not ready to sign a DNR/DNI   - Keep hb >7 and PLt >15,000K  - Her WBC today is 64    4. C/W meds for other co-morbidities.    5. DVT/GI ppx: Sequentials for now      Plan :  Palliative care consult            Labs today            Supportive care

## 2018-07-23 NOTE — PROGRESS NOTE ADULT - SUBJECTIVE AND OBJECTIVE BOX
GALA PHILLIPS, female, 78y ([birth year]),   [id number]  Admit Date: 07-16-18 (7d)      Chief Complaint:  Patient is a 78y old  Female who presents with a chief complaint of weakness (16 Jul 2018 23:57)      Interval History:  No acute events overnight. No complaints at this time.    Past Medical and Surgical History:  PAST MEDICAL & SURGICAL HISTORY:  Thrombocytopenia  Chronic myelomonocytic leukemia not having achieved remission  Non-Hodgkins lymphoma  S/P total abdominal hysterectomy      Current Medications:  MEDICATIONS  (STANDING):  calcium carbonate    500 mG (Tums) Chewable 1 Tablet(s) Chew daily  meropenem  IVPB      meropenem  IVPB 1000 milliGRAM(s) IV Intermittent every 12 hours  pantoprazole   Suspension 40 milliGRAM(s) Oral daily    MEDICATIONS  (PRN):        Vital Signs:  T(F): 96.4 (07-23-18 @ 14:29), Max: 98.2 (07-21-18 @ 20:21)  HR: 89 (07-23-18 @ 14:29) (85 - 92)  BP: 104/59 (07-23-18 @ 14:29) (102/49 - 111/54)  RR: 18 (07-23-18 @ 14:29) (18 - 19)  SpO2: 91% (07-23-18 @ 09:34) (91% - 96%)  CAPILLARY BLOOD GLUCOSE          Physical Exam:  General: Not in distress.   HEENT: Moist mucus membranes. PERRLA.  Cardio: Regular rate and rhythm, S1, S2, no murmur, rub, or gallop.  Pulm: Clear to auscultation bilaterally. No wheezing, rales, or rhonchi.  Abdomen: Soft, non-tender, non-distended. Normoactive bowel sounds.  Extremities: No cyanosis or edema bilaterally. No calf tenderness to palpation.  Neuro: A&O x3. No focal deficits. CN II - XII grossly intact.    Labs and Imaging:  CBC Full  -  ( 23 Jul 2018 07:43 )  WBC Count : 64.52 K/uL  Hemoglobin : 7.8 g/dL  Hematocrit : 22.3 %  Platelet Count - Automated : 33 K/uL  Mean Cell Volume : 79.9 fL  Mean Cell Hemoglobin : 28.0 pg  Mean Cell Hemoglobin Concentration : 35.0 g/dL  Auto Neutrophil # : 11.21 K/uL  Auto Lymphocyte # : 8.53 K/uL  Auto Monocyte # : 14.22 K/uL  Auto Eosinophil # : 0.13 K/uL  Auto Basophil # : 0.08 K/uL  Auto Neutrophil % : 17.5 %  Auto Lymphocyte % : 13.2 %  Auto Monocyte % : 22.0 %  Auto Eosinophil % : 0.2 %  Auto Basophil % : 0.1 %    RDW: 25.7      BMP: 07-23-18 @ 07:43  132 | 93 | 19   -----------------< 114  4.2  | 24 | 1.1  eGFR(AA): 56, eGFR (non-AA): 49  Ca 6.7, Mg --, P --    LFTs: 07-23-18 @ 07:43  TP  3.6  | 2.2 Alb   ---------------  TB  7.5  | --  DB   ---------------  ALT 25  | 50  AST            ^          215 ALK      LFTs: 07-23-18 @ 07:43  Ca  6.7  | 50 AST   -----------------  TP  3.6  | 25 ALT  -----------------  Alb 2.2  | 215 ALK          ^        7.5         TB       (collected 07-17-18 @ 23:00)  Source: .Urine Clean Catch (Midstream)  Final Report:    No growth     (collected 07-17-18 @ 17:49)  Source: .Blood None  Final Report:    No growth at 5 days.      < from: US Abdomen Limited (07.17.18 @ 10:10) >  IMPRESSION:    Liver is mildly lobulated in contour and heterogeneous in echotexture   consistent with liver disease.    No evidence of biliary duct dilatation.    Contracted gallbladder with stones.    Small to moderate right pleural effusion with atelectasis.    Increased ascites, now mild to moderate and previously mild    < end of copied text >    < from: MR Abdomen No Cont (07.20.18 @ 12:59) >  IMPRESSION:    Markedly limited exam secondary to motion artifact.    1. Unchanged abdominopelvic ascites, soft tissue anasarca and bilateral   pleural effusions.  2. Diffuse hepatic steatosis with nodular liver contour suggesting   cirrhosis.  3. Indeterminate multifocal bilobar T2 hyperintense foci scattered   throughout the liver.  4. Splenomegaly, essentially unchanged.    < end of copied text >        Home Medications:  Home Medications:

## 2018-07-24 VITALS — TEMPERATURE: 95 F | HEART RATE: 74 BPM | DIASTOLIC BLOOD PRESSURE: 25 MMHG | SYSTOLIC BLOOD PRESSURE: 59 MMHG

## 2018-07-24 LAB
ALBUMIN SERPL ELPH-MCNC: 2.1 G/DL — LOW (ref 3.5–5.2)
ALP SERPL-CCNC: 262 U/L — HIGH (ref 30–115)
ALT FLD-CCNC: 27 U/L — SIGNIFICANT CHANGE UP (ref 0–41)
ANION GAP SERPL CALC-SCNC: 21 MMOL/L — HIGH (ref 7–14)
AST SERPL-CCNC: 73 U/L — HIGH (ref 0–41)
BILIRUB SERPL-MCNC: 8.3 MG/DL — HIGH (ref 0.2–1.2)
BLD GP AB SCN SERPL QL: SIGNIFICANT CHANGE UP
BUN SERPL-MCNC: 27 MG/DL — HIGH (ref 10–20)
CALCIUM SERPL-MCNC: 6.2 MG/DL — LOW (ref 8.5–10.1)
CHLORIDE SERPL-SCNC: 93 MMOL/L — LOW (ref 98–110)
CO2 SERPL-SCNC: 19 MMOL/L — SIGNIFICANT CHANGE UP (ref 17–32)
CREAT SERPL-MCNC: 1.1 MG/DL — SIGNIFICANT CHANGE UP (ref 0.7–1.5)
GLUCOSE SERPL-MCNC: 84 MG/DL — SIGNIFICANT CHANGE UP (ref 70–99)
HCT VFR BLD CALC: 19.5 % — LOW (ref 37–47)
HGB BLD-MCNC: 6.7 G/DL — CRITICAL LOW (ref 12–16)
MCHC RBC-ENTMCNC: 27.2 PG — SIGNIFICANT CHANGE UP (ref 27–31)
MCHC RBC-ENTMCNC: 34.4 G/DL — SIGNIFICANT CHANGE UP (ref 32–37)
MCV RBC AUTO: 79.3 FL — LOW (ref 81–99)
NRBC # BLD: 10 /100 WBCS — HIGH (ref 0–0)
PLATELET # BLD AUTO: 35 K/UL — LOW (ref 130–400)
POTASSIUM SERPL-MCNC: 4.7 MMOL/L — SIGNIFICANT CHANGE UP (ref 3.5–5)
POTASSIUM SERPL-SCNC: 4.7 MMOL/L — SIGNIFICANT CHANGE UP (ref 3.5–5)
PROT SERPL-MCNC: 3.4 G/DL — LOW (ref 6–8)
RBC # BLD: 2.46 M/UL — LOW (ref 4.2–5.4)
RBC # FLD: 26.5 % — HIGH (ref 11.5–14.5)
SODIUM SERPL-SCNC: 133 MMOL/L — LOW (ref 135–146)
TYPE + AB SCN PNL BLD: SIGNIFICANT CHANGE UP
WBC # BLD: 70.21 K/UL — CRITICAL HIGH (ref 4.8–10.8)
WBC # FLD AUTO: 70.21 K/UL — CRITICAL HIGH (ref 4.8–10.8)

## 2018-07-24 RX ORDER — ONDANSETRON 8 MG/1
4 TABLET, FILM COATED ORAL ONCE
Qty: 0 | Refills: 0 | Status: COMPLETED | OUTPATIENT
Start: 2018-07-24 | End: 2018-07-24

## 2018-07-24 RX ORDER — MORPHINE SULFATE 50 MG/1
2 CAPSULE, EXTENDED RELEASE ORAL EVERY 4 HOURS
Qty: 0 | Refills: 0 | Status: DISCONTINUED | OUTPATIENT
Start: 2018-07-24 | End: 2018-07-24

## 2018-07-24 RX ORDER — ACETAMINOPHEN 500 MG
650 TABLET ORAL ONCE
Qty: 0 | Refills: 0 | Status: DISCONTINUED | OUTPATIENT
Start: 2018-07-24 | End: 2018-07-24

## 2018-07-24 RX ORDER — SODIUM CHLORIDE 9 MG/ML
1000 INJECTION INTRAMUSCULAR; INTRAVENOUS; SUBCUTANEOUS
Qty: 0 | Refills: 0 | Status: DISCONTINUED | OUTPATIENT
Start: 2018-07-24 | End: 2018-07-24

## 2018-07-24 RX ORDER — HYDROMORPHONE HYDROCHLORIDE 2 MG/ML
0.5 INJECTION INTRAMUSCULAR; INTRAVENOUS; SUBCUTANEOUS ONCE
Qty: 0 | Refills: 0 | Status: DISCONTINUED | OUTPATIENT
Start: 2018-07-24 | End: 2018-07-24

## 2018-07-24 RX ORDER — MORPHINE SULFATE 50 MG/1
4 CAPSULE, EXTENDED RELEASE ORAL EVERY 4 HOURS
Qty: 0 | Refills: 0 | Status: DISCONTINUED | OUTPATIENT
Start: 2018-07-24 | End: 2018-07-24

## 2018-07-24 RX ADMIN — MEROPENEM 100 MILLIGRAM(S): 1 INJECTION INTRAVENOUS at 05:36

## 2018-07-24 RX ADMIN — ONDANSETRON 4 MILLIGRAM(S): 8 TABLET, FILM COATED ORAL at 14:00

## 2018-07-24 RX ADMIN — HYDROMORPHONE HYDROCHLORIDE 0.5 MILLIGRAM(S): 2 INJECTION INTRAMUSCULAR; INTRAVENOUS; SUBCUTANEOUS at 20:33

## 2018-07-24 RX ADMIN — MORPHINE SULFATE 2 MILLIGRAM(S): 50 CAPSULE, EXTENDED RELEASE ORAL at 18:15

## 2018-07-24 RX ADMIN — PANTOPRAZOLE SODIUM 40 MILLIGRAM(S): 20 TABLET, DELAYED RELEASE ORAL at 12:02

## 2018-07-24 RX ADMIN — SODIUM CHLORIDE 75 MILLILITER(S): 9 INJECTION INTRAMUSCULAR; INTRAVENOUS; SUBCUTANEOUS at 05:36

## 2018-07-24 RX ADMIN — Medication 1 TABLET(S): at 12:02

## 2018-07-24 NOTE — PROGRESS NOTE ADULT - SUBJECTIVE AND OBJECTIVE BOX
78yFemale with diagnosis: ANEMIA CML      Patient seen and examined. Patient is dyspneic and  receiving blood transfusion.    PAST MEDICAL & SURGICAL HISTORY:  Thrombocytopenia  Chronic myelomonocytic leukemia not having achieved remission  Non-Hodgkins lymphoma  S/P total abdominal hysterectomy        PHYSICAL EXAM:  General: frail pale female,    HEENT: Moist mucus membranes. Scleral icterus  Cardio: S1, S2, RRR  Pulm: Clear to auscultation bilaterally. No wheezing, rales, or rhonchi.  Abdomen: Soft, non-tender, +BS.   Extremities: No cyanosis or edema bilaterally.         T(C): , Max: 36 (12:12)  T(F): 96.8  HR: 89 (88 - 94)  BP: 101/46 (83/41 - 112/49)  RR: 20 (18 - 20)  SpO2: 93% (91% - 94%)                                    6.7    70.21 )-----------( 35       ( 24 Jul 2018 06:45 )             19.5                                                                                      07-24    133<L>  |  93<L>  |  27<H>  ----------------------------<  84  4.7   |  19  |  1.1    Ca    6.2<L>      24 Jul 2018 06:45    TPro  3.4<L>  /  Alb  2.1<L>  /  TBili  8.3<H>  /  DBili  x   /  AST  73<H>  /  ALT  27  /  AlkPhos  262<H>  07-24                                                      MEDICATIONS  (STANDING):  calcium carbonate    500 mG (Tums) Chewable 1 Tablet(s) Chew daily  pantoprazole   Suspension 40 milliGRAM(s) Oral daily    MEDICATIONS  (PRN):  acetaminophen   Tablet. 650 milliGRAM(s) Oral once PRN Moderate Pain (4 - 6) 78yFemale with diagnosis: ANEMIA CML      Patient seen and examined. Patient is dyspneic and  receiving blood transfusion.    PAST MEDICAL & SURGICAL HISTORY:  Thrombocytopenia  Chronic myelomonocytic leukemia not having achieved remission  Non-Hodgkins lymphoma  S/P total abdominal hysterectomy        PHYSICAL EXAM:  General: frail pale female,    HEENT: . Scleral icterus  Cardio: S1, S2, RRR  Pulm: Clear to auscultation bilaterally.  Abdomen: Soft, non-tender, +BS.   Extremities: No cyanosis or edema bilaterally.         T(C): , Max: 36 (12:12)  T(F): 96.8  HR: 89 (88 - 94)  BP: 101/46 (83/41 - 112/49)  RR: 20 (18 - 20)  SpO2: 93% (91% - 94%)                                    6.7    70.21 )-----------( 35       ( 24 Jul 2018 06:45 )             19.5                                                                                      07-24    133<L>  |  93<L>  |  27<H>  ----------------------------<  84  4.7   |  19  |  1.1    Ca    6.2<L>      24 Jul 2018 06:45    TPro  3.4<L>  /  Alb  2.1<L>  /  TBili  8.3<H>  /  DBili  x   /  AST  73<H>  /  ALT  27  /  AlkPhos  262<H>  07-24                                                      MEDICATIONS  (STANDING):  calcium carbonate    500 mG (Tums) Chewable 1 Tablet(s) Chew daily  pantoprazole   Suspension 40 milliGRAM(s) Oral daily    MEDICATIONS  (PRN):  acetaminophen   Tablet. 650 milliGRAM(s) Oral once PRN Moderate Pain (4 - 6) 78yFemale with diagnosis: ANEMIA CML      Patient seen and examined. Patient is dyspneic and  receiving blood transfusion.    PAST MEDICAL & SURGICAL HISTORY:  Thrombocytopenia  Chronic myelomonocytic leukemia not having achieved remission  Non-Hodgkins lymphoma  S/P total abdominal hysterectomy        PHYSICAL EXAM:  General: frail pale female, in mild distress  HEENT: . Scleral icterus  Cardio: S1, S2, RRR  Pulm: Clear to auscultation bilaterally.  Abdomen: Soft, non-tender, +BS.   Extremities: No cyanosis or edema bilaterally.         T(C): , Max: 36 (12:12)  T(F): 96.8  HR: 89 (88 - 94)  BP: 101/46 (83/41 - 112/49)  RR: 20 (18 - 20)  SpO2: 93% (91% - 94%)                                    6.7    70.21 )-----------( 35       ( 24 Jul 2018 06:45 )             19.5                                                                                      07-24    133<L>  |  93<L>  |  27<H>  ----------------------------<  84  4.7   |  19  |  1.1    Ca    6.2<L>      24 Jul 2018 06:45    TPro  3.4<L>  /  Alb  2.1<L>  /  TBili  8.3<H>  /  DBili  x   /  AST  73<H>  /  ALT  27  /  AlkPhos  262<H>  07-24                                                      MEDICATIONS  (STANDING):  calcium carbonate    500 mG (Tums) Chewable 1 Tablet(s) Chew daily  pantoprazole   Suspension 40 milliGRAM(s) Oral daily    MEDICATIONS  (PRN):  acetaminophen   Tablet. 650 milliGRAM(s) Oral once PRN Moderate Pain (4 - 6)

## 2018-07-24 NOTE — PROGRESS NOTE ADULT - SUBJECTIVE AND OBJECTIVE BOX
Patient is a 78y old  Female who presents with a chief complaint of weakness (16 Jul 2018 23:57)      Subjective: No events overnight. Patient is more weak today      Vital Signs Last 24 Hrs  T(C): 35 (24 Jul 2018 05:43), Max: 35.8 (23 Jul 2018 14:29)  T(F): 95 (24 Jul 2018 05:43), Max: 96.4 (23 Jul 2018 14:29)  HR: 91 (24 Jul 2018 07:06) (88 - 94)  BP: 112/49 (24 Jul 2018 07:06) (83/41 - 112/49)  BP(mean): --  RR: 18 (24 Jul 2018 05:43) (18 - 18)  SpO2: 94% (23 Jul 2018 21:42) (91% - 94%)    PHYSICAL EXAM  General: adult in NAD  HEENT: clear oropharynx, icteric sclera, pale conjunctiva  Neck: supple  CV: normal S1/S2 with no murmur rubs or gallops  Lungs: positive air movement b/l ant lungs,clear to auscultation, no wheezes, no rales  Abdomen: distended, shifting dullness  Ext: no clubbing cyanosis , + edema  Skin: no rashes and no petechiae  Neuro: alert and oriented X 4, no focal deficits    MEDICATIONS  (STANDING):  calcium carbonate    500 mG (Tums) Chewable 1 Tablet(s) Chew daily  meropenem  IVPB      meropenem  IVPB 1000 milliGRAM(s) IV Intermittent every 12 hours  pantoprazole   Suspension 40 milliGRAM(s) Oral daily  sodium chloride 0.9%. 1000 milliLiter(s) (75 mL/Hr) IV Continuous <Continuous>    MEDICATIONS  (PRN):  acetaminophen   Tablet. 650 milliGRAM(s) Oral once PRN Moderate Pain (4 - 6)      LABS:                          7.8    64.52 )-----------( 33       ( 23 Jul 2018 07:43 )             22.3         Mean Cell Volume : 79.9 fL  Mean Cell Hemoglobin : 28.0 pg  Mean Cell Hemoglobin Concentration : 35.0 g/dL  Auto Neutrophil # : 11.21 K/uL  Auto Lymphocyte # : 8.53 K/uL  Auto Monocyte # : 14.22 K/uL  Auto Eosinophil # : 0.13 K/uL  Auto Basophil # : 0.08 K/uL  Auto Neutrophil % : 17.5 %  Auto Lymphocyte % : 13.2 %  Auto Monocyte % : 22.0 %  Auto Eosinophil % : 0.2 %  Auto Basophil % : 0.1 %      Serial CBC's  07-23 @ 07:43  Hct-22.3 / Hgb-7.8 / Plat-33 / RBC-2.79 / WBC-64.52  Serial CBC's  07-21 @ 10:17  Hct-24.5 / Hgb-8.8 / Plat-22 / RBC-3.14 / WBC-35.68      07-23    132<L>  |  93<L>  |  19  ----------------------------<  114<H>  4.2   |  24  |  1.1    Ca    6.7<L>      23 Jul 2018 07:43    TPro  3.6<L>  /  Alb  2.2<L>  /  TBili  7.5<H>  /  DBili  x   /  AST  50<H>  /  ALT  25  /  AlkPhos  215<H>  07-23          Iron - Total Binding Capacity.: <106 ug/dL (07-17 @ 08:02)  Ferritin, Serum: 8729 ng/mL (07-17 @ 08:02)    RADIOLOGY & ADDITIONAL STUDIES:  < from: MR Abdomen No Cont (07.20.18 @ 12:59) >  Markedly limited exam secondary to motion artifact.    1. Unchanged abdominopelvic ascites, soft tissue anasarca and bilateral   pleural effusions.  2. Diffuse hepatic steatosis with nodular liver contour suggesting   cirrhosis.  3. Indeterminate multifocal bilobar T2 hyperintense foci scattered   throughout the liver.  4. Splenomegaly, essentially unchanged.      < end of copied text >

## 2018-07-24 NOTE — PROGRESS NOTE ADULT - ASSESSMENT
CML w/ persistent leukocytosis: was on vidaza, last cycle June 4th.--> Liver failure likely 2/2 disease advancement vs chemotherapy adverse effect    -Clinically, pt is rapidly deteriorating, T. bili 8.2.   -seen by palliative: 2mgIV q4hr rtc.   -Hospice unable to connect with daughter. contacted again to follow up.  -s/p 1 unit prbc today (Hb 6.7)  -CT abdo w/IV (07/17): No evidence of obstruction. nodular liver contour and moderate ascites   -MRCP: Diffuse hepatic steatosis w/ nodular liver contour suggesting cirrhosis. No signs of obstruction.        -Anemia  Hemolysis workup: LDH 1153 Ferritin 8729 TIBC <107  -No schistocytes         #DVT ppx:  - Heparin SubQ    #Dispo:  - Hospice eval for Home w/ comfort care planning. Pt and family do not want NH.

## 2018-07-24 NOTE — PROGRESS NOTE ADULT - PROVIDER SPECIALTY LIST ADULT
Gastroenterology
Gastroenterology
Heme/Onc
Internal Medicine
Palliative Care
Urology
Gastroenterology
Internal Medicine
Internal Medicine

## 2018-07-24 NOTE — DISCHARGE NOTE FOR THE EXPIRED PATIENT - HOSPITAL COURSE
The patient was a 79 y/o female who presented from Dr. Jackson's office for jaundice, hypotension, and anemia. She had a past medical history of NHL (diagnosed 5 years ago) and was recently diagnosed CML. She did not report noticing her becoming jaundiced, but she did feel extremely weak. Patient also reported that her legs were more swollen than usual because she had been walking less for the past 2 weeks secondary to her weakness. She was found to be hypotensive in the ED, as well as have anemia, leukocytosis with a left shift and monocytosis, hypokalemia, hypomagnasemia, hyperbilirubinemia, elevated INR, lactic acidosis, and ascites. Hepatosplenomegaly was appreciated on RUQ US from 7/10. In ED, she received 2U PRBC, 250cc NS bolus with maintenance NS @ 75 cc/hr, 2gm Mag rider, and 80meq of potassium. She was started on antibiotics over her hospital course to treat for infection with source unidentified. She had a working diagnosis of leukocytosis likely secondary to CML, anemia, cirrhosis, hypocalcemia, and hypokalemia; she was treated medically during her hospital course.     As the patient's prognosis become more poor, the health care proxy (daughter/Charlette) had made the patient DNR/DNI. The patient soon  on 2018 at 9:33pm, pronounced by Dr. Suggs. The attending, Dr. Jackson was informed.

## 2018-07-24 NOTE — PROGRESS NOTE ADULT - SUBJECTIVE AND OBJECTIVE BOX
GALA PHILLIPS, female, 78y ([birth year]),   MRN-[id number]  Admit Date: 07-16-18 (8d)      Chief Complaint:  Patient is a 78y old  Female who presents with a chief complaint of weakness (16 Jul 2018 23:57)      Interval History:  pt rapidly deteriorating. Went from being lethargic but AAO x3 in the morning to AAOx0 tonight. Explained the poor prognosis and rapid deterioration to the daughter. Pt is now DNR/DNI. Hospcie tried to get in touch with the patient but were unable too. Started pt on morphine 2mg IV q4hr prn for now.    Past Medical and Surgical History:  PAST MEDICAL & SURGICAL HISTORY:  Thrombocytopenia  Chronic myelomonocytic leukemia not having achieved remission  Non-Hodgkins lymphoma  S/P total abdominal hysterectomy      Current Medications:  MEDICATIONS  (STANDING):  calcium carbonate    500 mG (Tums) Chewable 1 Tablet(s) Chew daily  pantoprazole   Suspension 40 milliGRAM(s) Oral daily    MEDICATIONS  (PRN):  acetaminophen   Tablet. 650 milliGRAM(s) Oral once PRN Moderate Pain (4 - 6)  morphine  - Injectable 2 milliGRAM(s) IV Push every 4 hours PRN Moderate Pain (4 - 6)        Vital Signs:  T(F): 96.8 (07-24-18 @ 12:12), Max: 96.8 (07-24-18 @ 12:12)  HR: 89 (07-24-18 @ 12:12) (88 - 94)  BP: 101/46 (07-24-18 @ 12:12) (83/41 - 112/49)  RR: 20 (07-24-18 @ 12:12) (18 - 20)  SpO2: 93% (07-24-18 @ 12:42) (91% - 94%)  CAPILLARY BLOOD GLUCOSE          Physical Exam:  General: lethargic, AAOx0  SKIN: still jaundiced, no acute lesions.  HEENT: Moist mucus membranes. PERRLA.  Cardio: Regular rate and rhythm  Pulm: Clear to auscultation bilaterally. No wheezing, rales, or rhonchi.  Abdomen: Distended, ascited fluid more pronounced on the left side.  Extremities: 1+ edema b/l LE today.  Neuro: A&O x0. No focal deficits.     Labs and Imaging:  CBC Full  -  ( 24 Jul 2018 06:45 )  WBC Count : 70.21 K/uL  Hemoglobin : 6.7 g/dL  Hematocrit : 19.5 %  Platelet Count - Automated : 35 K/uL  Mean Cell Volume : 79.3 fL  Mean Cell Hemoglobin : 27.2 pg  Mean Cell Hemoglobin Concentration : 34.4 g/dL  Auto Neutrophil # : x  Auto Lymphocyte # : x  Auto Monocyte # : x  Auto Eosinophil # : x  Auto Basophil # : x  Auto Neutrophil % : x  Auto Lymphocyte % : x  Auto Monocyte % : x  Auto Eosinophil % : x  Auto Basophil % : x    RDW: 26.5      BMP: 07-24-18 @ 06:45  133 | 93 | 27   -----------------< 84  4.7  | 19 | 1.1  eGFR(AA): 56, eGFR (non-AA): 49  Ca 6.2, Mg --, P --    LFTs: 07-24-18 @ 06:45  TP  3.4  | 2.1 Alb   ---------------  TB  8.3  | --  DB   ---------------  ALT 27  | 73  AST            ^          262 ALK  LFTs: 07-23-18 @ 07:43  TP  3.6  | 2.2 Alb   ---------------  TB  7.5  | --  DB   ---------------  ALT 25  | 50  AST            ^          215 ALK      LFTs: 07-24-18 @ 06:45  Ca  6.2  | 73 AST   -----------------  TP  3.4  | 27 ALT  -----------------  Alb 2.1  | 262 ALK          ^        8.3         TB         (collected 07-17-18 @ 23:00)  Source: .Urine Clean Catch (Midstream)  Final Report:    No growth     (collected 07-17-18 @ 17:49)  Source: .Blood None  Final Report:    No growth at 5 days.        Home Medications:  Home Medications:

## 2018-07-24 NOTE — PROGRESS NOTE ADULT - ASSESSMENT
78yFemale being evaluated for [facility] of care. Patient had a change in status from yesterday. Patient is markedly dyspneic (RR 30) on nasal cannula, hypothermic on a warming blanket and with altered mentation. Patient is  clinically deteriorating, not doing well. T. bili rising. Palliative NP re-visited code status with daughter at bedside. Daughter was very distressed about the rapid change, and stated that she does not want patient to suffer. Patient made patient a DNI/DNR and will follow up with hospice. Daughter is concerned that patient might not be stable enough to be discharged to home hospice. Palliative NP reassured daughter that we will keep patient comfortable while she is hospitalized. Palliative team will continue to provide supportive and emotional care.             Recommendations:  Morphine 2mg iv q4hrs rtc  DNI/DNR  poor prognosis   we will f/u

## 2018-07-24 NOTE — CHART NOTE - NSCHARTNOTEFT_GEN_A_CORE
Registered Dietitian Follow-Up     Patient Profile Reviewed                           Yes [x]   No []     Nutrition History Previously Obtained        Yes [x]  No []       Pertinent Subjective Information:  -Pt laying in bed at time of assessment. Reports very poor PO intake and appetite. Pt only consumed a few bites of banana this morning and typically refusing to eat meals. RD reccs for Ensure Enlive BID remain pending--discussed reccs with LIP ([place]).      Pertinent Medical Interventions: CMML progressing on vidaza ( last cycle June 4). Jaundice with elevated direct bilirubin- no evidence of obstruction on MRCP-most likely leukemic infiltration of liver and disease progression  vs drug related. Per heme/onc chemo will no longer be beneficial/offered. Goals of care conversation: DNR/DNI, hospice referral placed but family final decision still pending.      Diet order: Regular      Anthropometrics:  - Ht. 157.4cm   - Wt. 67.7kg (7/17)  - %wt change: no new wt   - BMI 27.3   - IBW 50kg      Pertinent Lab Data:     Pertinent Meds: NS @ 75mL/hr, abx, protonix, tums      Physical Findings:  - Appearance: AAO, jaundice, 3+ pitting edema b/l legs   - GI function: none reported by pt +LBM 7/23   - Tubes:  - Oral/Mouth cavity: none reported by pt   - Skin: R buttocks stage II pressure ulcer--documentation began 7/22      Nutrition Requirements  Weight Used: 67.7kg      Estimated Energy Needs    Continue [x] 1350-1462kcal (MSJ x 1.2-1.3 AF)  Estimated Protein Needs    Continue [x]  67-80g (1-1.2g/kg CBW)  Estimated Fluid Needs        Continue [x]  1ml/kcal or per LIP     Nutrient Intake: not meeting needs      [x] Previous Nutrition Diagnosis: Inadequate oral intake-not improving             [x] Ongoing          [] Resolved    Nutrition Intervention: meals and snacks, medical food supplements   1. Regular diet   2. Ensure Enlive TID-recommend instead of ensure compact despite edema as supplement is pts main source intake     Goal/Expected Outcome: Pt to consume/tolerate >50% meals, snacks and supplements x 3 days      Indicator/Monitoring: RD will monitor diet order, energy intake, wt trends, nutrition related labs, NFPF (appetite)

## 2018-07-26 ENCOUNTER — APPOINTMENT (OUTPATIENT)
Dept: HEMATOLOGY ONCOLOGY | Facility: CLINIC | Age: 78
End: 2018-07-26

## 2018-07-30 ENCOUNTER — APPOINTMENT (OUTPATIENT)
Dept: INFUSION THERAPY | Facility: CLINIC | Age: 78
End: 2018-07-30

## 2018-08-02 ENCOUNTER — APPOINTMENT (OUTPATIENT)
Dept: HEMATOLOGY ONCOLOGY | Facility: CLINIC | Age: 78
End: 2018-08-02

## 2018-08-02 DIAGNOSIS — Z66 DO NOT RESUSCITATE: ICD-10-CM

## 2018-08-02 DIAGNOSIS — Z51.5 ENCOUNTER FOR PALLIATIVE CARE: ICD-10-CM

## 2018-08-02 DIAGNOSIS — I95.9 HYPOTENSION, UNSPECIFIED: ICD-10-CM

## 2018-08-02 DIAGNOSIS — K72.90 HEPATIC FAILURE, UNSPECIFIED WITHOUT COMA: ICD-10-CM

## 2018-08-02 DIAGNOSIS — E83.51 HYPOCALCEMIA: ICD-10-CM

## 2018-08-02 DIAGNOSIS — C93.10 CHRONIC MYELOMONOCYTIC LEUKEMIA NOT HAVING ACHIEVED REMISSION: ICD-10-CM

## 2018-08-02 DIAGNOSIS — E87.2 ACIDOSIS: ICD-10-CM

## 2018-08-02 DIAGNOSIS — Z88.0 ALLERGY STATUS TO PENICILLIN: ICD-10-CM

## 2018-08-02 DIAGNOSIS — D65 DISSEMINATED INTRAVASCULAR COAGULATION [DEFIBRINATION SYNDROME]: ICD-10-CM

## 2018-08-02 DIAGNOSIS — Z90.710 ACQUIRED ABSENCE OF BOTH CERVIX AND UTERUS: ICD-10-CM

## 2018-08-02 DIAGNOSIS — R33.9 RETENTION OF URINE, UNSPECIFIED: ICD-10-CM

## 2018-08-02 DIAGNOSIS — E87.6 HYPOKALEMIA: ICD-10-CM

## 2018-08-02 DIAGNOSIS — E88.09 OTHER DISORDERS OF PLASMA-PROTEIN METABOLISM, NOT ELSEWHERE CLASSIFIED: ICD-10-CM

## 2018-08-02 DIAGNOSIS — Z85.71 PERSONAL HISTORY OF HODGKIN LYMPHOMA: ICD-10-CM

## 2018-08-02 DIAGNOSIS — E80.6 OTHER DISORDERS OF BILIRUBIN METABOLISM: ICD-10-CM

## 2018-08-02 DIAGNOSIS — D72.829 ELEVATED WHITE BLOOD CELL COUNT, UNSPECIFIED: ICD-10-CM

## 2018-09-28 NOTE — H&P ADULT - NSHPSOURCEINFORD_GEN_ALL_CORE
After Visit Summary   9/28/2018    Amina Pineda    MRN: [id number]           Patient Information     Date Of Birth          1956        Visit Information        Provider Department      9/28/2018 1:45 PM Hunter Pruitt MD [facility]        Today's Diagnoses     Tonsillar cyst    -  1      Care Instructions    General Scheduling Information  To schedule your CT/MRI scan, please contact [place] at [phone] [address] [place] [address]    To schedule your Surgery, please contact our Specialty Schedulers at [phone]    ENT Clinic Locations Clinic Hours Telephone Number     Joe Capac  [address]  [place] [address]   Tuesday:       8:00am -- 4:00pm    Wednesday:  8:00am - 4:00pm   To schedule an appointment with   Dr. Pruitt,   please contact our   Specialty Scheduling Department at:     [phone]       [facility]  [address] [facility] [place] [address]   Friday:          8:00am - 4:00pm         Urgent Care Locations Clinic Hours Telephone Numbers     Joe Parrish  [address] [place] [address]     Monday-Friday:     11:00pm - 9:00pm    Saturday-Sunday:  9:00am - 5:00pm   [phone]     [facility]  [address]   [place] [address]     Monday-Friday:      5:00pm - 9:00pm     Saturday-Sunday:  9:00am - 5:00pm   [phone]                   Follow-ups after your visit        Your next 10 appointments already scheduled     Oct 30, 2018  3:30 PM CDT   Return Visit with Tiff Dunn MD   [facility] [[email or web address] ([facility] [[email or web address]])    [address] [place] [id number] [phone]              Who to contact     If you have questions or need follow up information about today's clinic visit or your schedule please contact [facility] directly at [phone].  Normal or non-critical lab and imaging results will be communicated to you by Angelina 
Patient
letter or phone within 4 business days after the clinic has received the results. If you do not hear from us within 7 days, please contact the clinic through [employer] or phone. If you have a critical or abnormal lab result, we will notify you by phone as soon as possible.  Submit refill requests through [employer] or call your pharmacy and they will forward the refill request to us. Please allow 3 business days for your refill to be completed.          Additional Information About Your Visit        [email or web address] Information     [employer] gives you secure access to your electronic health record. If you see a primary care provider, you can also send messages to your care team and make appointments. If you have questions, please call your primary care clinic.  If you do not have a primary care provider, please call [phone] and they will assist you.        Care EveryWhere ID     This is your Care EveryWhere ID. This could be used by other organizations to access your [facility] medical records  [id number]        Your Vitals Were     Temperature BMI (Body Mass Index)                97  F (36.1  C) (Tympanic) 35.43 kg/m2           Blood Pressure from Last 3 Encounters:   09/28/18 132/78   09/13/18 123/76   08/28/18 151/87    Weight from Last 3 Encounters:   09/28/18 105.7 kg (233 lb)   08/28/18 105.9 kg (233 lb 6.4 oz)   03/01/18 108 kg (238 lb)              Today, you had the following     No orders found for display       Primary Care Provider Office Phone # Fax #    Briana Melton [phone]       [address] [facility] [address]        Equal Access to Services     [facility]: Hadii aad ku hadasho Soomaali, waaxda luqadaha, qaybta kaalmada liane sung . So [facility] [phone].    ATENCIÓN: Si habla español, tiene a aguilar disposición servicios gratuitos de asistencia lingüística. Llame al [phone].    We comply with applicable federal civil rights laws and 
[place] laws. We do not discriminate on the basis of race, color, national origin, age, disability, sex, sexual orientation, or gender identity.            Thank you!     [facility] you for [facility]  [facility]. Our goal is always to provide you with excellent care. Hearing back from our patients is one way we can continue to improve our services. Please take a few minutes to complete the written survey that you may receive in the mail after your visit with us. Thank you!             Your Updated Medication List - Protect others around you: Learn how to safely use, store and throw away your medicines at www.disposemymeds.org.          This list is accurate as of 9/28/18  5:09 PM.  Always use your most recent med list.                   Brand Name Dispense Instructions for use Diagnosis    albuterol 108 (90 Base) MCG/ACT inhaler    PROAIR HFA/PROVENTIL HFA/VENTOLIN HFA    1 Inhaler    Inhale 2 puffs into the lungs every 6 hours as needed for shortness of breath / dyspnea or wheezing    Acquired hypothyroidism, Menopausal syndrome (hot flushes), Gastroesophageal reflux disease, esophagitis presence not specified, Decreased libido, High risk medication use, Encounter for screening mammogram for breast cancer       BIOTIN PO           buPROPion 150 MG 24 hr tablet    WELLBUTRIN XL    90 tablet    Take 1 tablet (150 mg) by mouth every morning    Acquired hypothyroidism, Menopausal syndrome (hot flushes), Gastroesophageal reflux disease, esophagitis presence not specified, Decreased libido, High risk medication use, Encounter for screening mammogram for breast cancer       cephALEXin 500 MG capsule    KEFLEX    20 capsule    Take 1 capsule (500 mg) by mouth 2 times daily    Tonsillitis       ciclopirox 8 % Soln     13.2 mL    Apply to adjacent skin and affected nails daily. Remove with alcohol every 7 days    Onychomycosis       cyclobenzaprine 10 MG tablet    FLEXERIL    90 tablet    Take 0.5-1 
tablets (5-10 mg) by mouth 3 times daily as needed for muscle spasms    Chronic low back pain, unspecified back pain laterality, with sciatica presence unspecified       famciclovir 500 MG tablet    FAMVIR    180 tablet    Take 1 tablet (500 mg) by mouth 2 times daily as needed    Herpes simplex virus (HSV) infection       hydroquinone 4 % Crea     56.8 g    Externally apply topically At Bedtime Apply a thin layer to dark areas once nightly for no more than 8 weeks at a time. Take breaks between use.    Solar lentigo       IRON SUPPLEMENT PO           levothyroxine 125 MCG tablet    SYNTHROID/LEVOTHROID    90 tablet    Take 1 tablet (125 mcg) by mouth daily    Acquired hypothyroidism, Menopausal syndrome (hot flushes), Gastroesophageal reflux disease, esophagitis presence not specified, Decreased libido, High risk medication use, Encounter for screening mammogram for breast cancer       nystatin 171584 UNIT/GM Powd    MYCOSTATIN    30 g    Apply 30 g topically 3 times daily as needed    Yeast infection of the skin       omeprazole 20 MG CR capsule    priLOSEC    90 capsule    TAKE ONE CAPSULE BY MOUTH EVERY DAY    Gastroesophageal reflux disease, esophagitis presence not specified, Decreased libido, Acquired hypothyroidism, Menopausal syndrome (hot flushes), High risk medication use, Need for hepatitis C screening test, Encounter for screening mammogram for breast cancer       order for DME     1 Device    Equipment being ordered: Crutch, Adult, Aluminum    Great toe pain, right       PREMARIN 0.3 MG tablet   Generic drug:  estrogens (conjugated)     90 tablet    TAKE ONE TABLET BY MOUTH EVERY DAY    Menopausal syndrome (hot flushes), Acquired hypothyroidism, Gastroesophageal reflux disease, esophagitis presence not specified, Decreased libido, High risk medication use, Need for hepatitis C screening test, Encounter for screening mammogram for breast cancer       Probiotic 250 MG Caps      Take by mouth daily        
UNABLE TO FIND      MEDICATION NAME: Lipoflavinoid+ For ringing in ears        VITAMIN C PO           VITAMIN D PO      Take by mouth daily          
Patient

## 2019-02-25 NOTE — DISCHARGE NOTE ADULT - PHYSICIAN SECTION COMPLETE
Follow the multicolored discharge instruction sheet given to you.  Remember to  CALL the DOCTOR if:  You have any pain that appears to be getting worse, or you get no relief from pain after taking the pain medicine prescribed for you.  You have  not urinated 8 hours after surgery or have any difficulty urinating.
Yes

## 2019-05-31 NOTE — PATIENT PROFILE ADULT. - PRO ARRIVE FROM
Medical Week 3 Survey      Responses   Facility patient discharged from?  [facility]   Does the patient have one of the following disease processes/diagnoses(primary or secondary)?  Other   Week 3 attempt successful?  Yes   Call start time  1424   Call end time  1426   Discharge diagnosis  Acute UTI   Is patient permission given to speak with other caregiver?  Yes   List who call center can speak with  Pamela Martin   Person spoke with today (if not patient) and relationship  Son- Mario   Meds reviewed with patient/caregiver?  Yes   Is the patient taking all medications as directed (includes completed medication regime)?  Yes   Has the patient kept scheduled appointments due by today?  Yes   What is the Home health agency?   [facility]    Psychosocial issues?  No   Did the patient receive a copy of their discharge instructions?  Yes   Nursing interventions  Reviewed instructions with patient   What is the patient's perception of their health status since discharge?  Improving   Is the patient/caregiver able to teach back signs and symptoms related to disease process for when to call PCP?  Yes   Is the patient/caregiver able to teach back signs and symptoms related to disease process for when to call 911?  Yes   Is the patient/caregiver able to teach back the hierarchy of who to call/visit for symptoms/problems? PCP, Specialist, Home health nurse, Urgent Care, ED, 911  Yes   Additional teach back comments  Per son pt is doing really well.   Week 3 Call Completed?  Yes          Lay Prieto RN  
home

## 2019-10-02 NOTE — DISCHARGE NOTE ADULT - BECAUSE OF A PHYSICAL, MENTAL OR EMOTIONAL CONDITION, DO YOU HAVE DIFFICULTY DOING  ERRANDS ALONE LIKE VISITING A DOCTOR'S OFFICE OR SHOPPING (15 YEARS AND OLDER)
PRE/POST OP PATIENT EDUCATION    Post Operative Instructions     Range of Motion/lifting Precautions post surgery  The following activities should be avoided until your drain(s) have been removed  - do not lift affected arm above 90 degrees of shoulder flexion  - do not lift over 5 lbs  - do not pull or push heavy objects  - do not sleep on your stomach or surgery side       After surgery, you may begin self-care tasks including grooming, dressing, feeding and simple hygiene as soon as you feel up to it.    Schedule your post-op therapy follow-up after your drains have been removed     When to call your doctor   - if any part of your affected arm or axilla feels hot, is reddened or has increased swelling   - if you develop a temperature over 101 degrees Fahrenheit      Lymphedema - Identification and Prevention     Lymphedema - is the swelling of a body area or extremity caused by the accumulation of lymphatic fluid.  There is a risk for lymphedema with the removal of lymph nodes, trauma or radiation therapy.  Treatment of breast cancer often involves surgery: mastectomy or lumpectomy. Some of the lymph nodes in the underarm (called axillary lymph nodes) may be removed and checked to see if they contain cancer cells.     During breast surgery when axillary lymph nodes are removed (with sentinel node biopsy or axillary dissection) or are treated with radiation therapy, the lymphatic system may become impaired. This may prevent lymphatic fluid from leaving the area therefore, causing lymphedema.     Lymphatic fluid is a normal part of the circulatory system. Its function is to remove waste products and to produce cells vital to fighting infection. Swelling occurs when the vessels become restricted and the lymphatic fluid is unable to freely flow through them.  If lymphedema is left untreated, the affected limb could progressively become more swollen, which could lead to hardening  of the skin, bulkiness in the limb, infection and impaired wound healing.         There are things you can do to decrease the chance of developing lymphedema.                                          [email or web address]                                                                                                                                                  The information presented is intended for general information and educational purposes. It is not intended to replace the  advice of your health care provider. Contact your health care provider if you believe you have a health problem.                                                    POST OP EXERCISES - SAFE TO DO THE FIRST 2 WEEKS AFTER SURGERY UNTIL YOUR FOLLOW UP APPOINTMENT WITH PHYSICAL/OCCUPATIONAL THERAPY    Scapular Retraction (Standing)    With arms at sides, squeeze shoulder blades together. Do not shrug shoulders and do not hold your breath. Hold 5 seconds. Repeat 15 times 3 sessions 1-2 x day.       Exaggerated Breathing and Relaxation      Practice deep breathing frequently in the first few days following surgery even before you begin exercising. This exercise helps with tissue extensibility in the chest wall.  Inhale slowly and deeply through the nose and exhale through pursed lips. Concentrate on relaxing as you let the air out of your lungs. Repeat three (3) to four (4) times, remembering to breath in deeply and then relaxing. This exercise helps to ease the sensation of pulling and discomfort that may be experienced while exercising.      Ball Squeeze OR Hand pumps       Perform this exercise three (3) times a day for 1-3 minutes each time.    The ball squeeze or hand pumps helps to prevent or reduce temporary swelling that may occur in the affected arm. This exercise may be performed standing, sitting or while lying in bed. During this exercise the affected arm should be slightly bent and held upward. Support your arm with a  pillow if you are uncomfortable holding it up.    a. Hold a rubber ball in your hand on the affected side and lift your arm upward.  b. Alternate squeezing and relaxing the ball.      Pendulum Swing      Perform this exercise (2) times a day with ten (10) repetitions.    a. Rest your other hand on the back of a chair or table to steady yourself. Bend forward at the wrist with the involved arm hanging freely toward the floor.  b. Gently swing the involved arm forward and backward, gradually increasing the size of the swing.  c. Next, gently swing the involved arm side to side, gradually increasing the size of the swing.  d. Then make small circles with the involved arm and gradually increase the size of the [place]. Repeat making circles in the opposite direction beginning with small circles and gradually increasing the [place] size.          AROM: Elbow Flexion / Extension        With left hand palm up, gently bend elbow as far as possible. Then straighten arm as far as possible. Do this in standing.   Repeat 15 times per set. Do 3 sets per session. Do 1-3 sessions per day.       No

## 2020-09-23 NOTE — ED PROVIDER NOTE - NS ED MD EM SELECTION
How Severe Are They?: moderate
Is This A New Presentation, Or A Follow-Up?: Skin Lesion
[id number] Comprehensive

## 2021-01-04 NOTE — H&P ADULT - NSHPPHYSICALEXAM_GEN_ALL_CORE
Gen NAD, A&Ox3  CV +S1 and S2, RR  Resp +air entry, no rales or wheezing  Abd Soft, non tender, not distended  Ext no edema, no CT
No

## 2022-01-11 NOTE — PATIENT PROFILE ADULT. - FUNCTIONAL SCREEN CURRENT LEVEL: SWALLOWING (IF SCORE 2 OR MORE FOR ANY ITEM, CONSULT REHAB SERVICES), MLM)
Nephrology Progress Note  Dorian Antonio     [email or web address]. 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   Hypertension   Anemia   ACUTE Metabolic encephalopathy   Hx of seizures    PLAN-   dialysis today   He will need out pt hd set up   Continue norvasc   Continue hd tts schedule   Watch for renal recovery         Subjective: Interval History:   Seen on hd  bp stable  Na low - 133  No sob        Objective:   Vitals:    01/11/22 0845 01/11/22 0900 01/11/22 0915 01/11/22 0930   BP: 124/79 117/79 (!) 141/87 132/89   Pulse: 83 84 80 78   Resp: 18 18 18 18   Temp:       TempSrc:       SpO2:       Weight:       Height:          01/10 0701 - 01/11 0700  In: -   Out: 700 [Urine:700]  Last 3 Recorded Weights in this Encounter    01/06/22 2249 01/08/22 0645 01/10/22 0621   Weight: 53.3 kg (117 lb 9.6 oz) 53.6 kg (118 lb 1.6 oz) 55.9 kg (123 lb 4.8 oz)      Physical exam:   GEN:  NAD  NECK:  Supple, no thyromegaly  RESP: Clear  b/l, no  wheezing,   CVS: RRR,S1,S2   NEURO: non focal, normal speech  EXT:no Edema +nt       Right permcath +      Chart reviewed. Pertinent Notes reviewed. Data Review :  Recent Labs     01/11/22  0826   *   K 4.9   CL 96*   CO2 30   BUN 40*   CREA 4.85*   *   CA 9.2   PHOS 4.3     Recent Labs     01/11/22  0826   WBC 9.5   HGB 9.7*   HCT 29.8*        No results for input(s): FE, TIBC, PSAT, FERR in the last 72 hours.    Medication list  reviewed  Current Facility-Administered Medications   Medication Dose Route Frequency    traMADoL (ULTRAM) tablet 50 mg  50 mg Oral Q6H PRN    insulin lispro (HUMALOG) injection 5 Units  5 Units SubCUTAneous TIDAC    insulin glargine (LANTUS) injection 15 Units  15 Units SubCUTAneous QHS    epoetin jeff-epbx (RETACRIT) injection 4,000 Units  4,000 Units SubCUTAneous Q TUE, THU & SAT    melatonin tablet 3 mg  3 mg Oral QHS    thiamine mononitrate (B-1) tablet 100 mg  100 mg Oral DAILY    butalbital-acetaminophen-caffeine (FIORICET, ESGIC) -40 mg per tablet 1 Tablet  1 Tablet Oral Q6H PRN    amLODIPine (NORVASC) tablet 5 mg  5 mg Oral DAILY    heparin (porcine) 1,000 unit/mL injection 1,700 Units  1,700 Units [facility] DIALYS[facility] PRN    And    heparin (porcine) 1,000 unit/mL injection 1,600 Units  1,600 Units [facility] DIALYSIS PRN    levETIRAcetam (KEPPRA) tablet 500 mg  500 mg Oral BID    lacosamide (VIMPAT) tablet 100 mg  100 mg Oral Q12H    hydrALAZINE (APRESOLINE) 20 mg/mL injection 10 mg  10 mg IntraVENous Q6H PRN    ondansetron (ZOFRAN ODT) tablet 4 mg  4 mg Oral Q8H PRN    Or    ondansetron (ZOFRAN) injection 4 mg  4 mg IntraVENous Q6H PRN    acetaminophen (TYLENOL) tablet 650 mg  650 mg Oral Q4H PRN    Or    acetaminophen (TYLENOL) solution 650 mg  650 mg Per NG tube Q4H PRN    Or    acetaminophen (TYLENOL) suppository 650 mg  650 mg Rectal Q4H PRN    insulin lispro (HUMALOG) injection   SubCUTAneous AC&HS    glucose chewable tablet 16 g  4 Tablet Oral PRN    dextrose (D50W) injection syrg 12.5-25 g  12.5-25 g IntraVENous PRN    glucagon (GLUCAGEN) injection 1 mg  1 mg IntraMUSCular PRN          Heidi Dubose MD              [facility]  [facility] / [facility], [address] [place], [address]  Phone - [phone]               Fax - [phone] ([phone]) swallows foods/liquids without difficulty

## 2022-07-17 NOTE — PATIENT PROFILE ADULT. - NS PRO PT REFERRAL QUES 2 YN
General: NAD  Head: NC, AT  EENT: no scleral icterus  Cardiac: RRR, no apparent murmurs, no lower extremity edema  Respiratory: CTABL, no respiratory distress   Abdomen: soft, ND, NT, nonperitonitic  MSK/Vascular: full ROM, soft compartments, warm extremities  Neuro: sensation to light touch intact  Psych: calm, cooperative no

## 2022-08-23 NOTE — DISCHARGE NOTE ADULT - CARE PROVIDERS DIRECT ADDRESSES
Sculptra Price Per Syringe: 500 Botox Price Per Unit: 15 Discount Percentage: 0 Notice: We have created a more complete Cosmetic Quote plan.  The procedure name is also Cosmetic Quote.  Please review the new plan and hide the Cosmetic Quote plan you do not want to use. Detail Level: Simple ,DirectAddress_Unknown,[email or web address]

## 2022-12-02 NOTE — DISCHARGE NOTE ADULT - VISION (WITH CORRECTIVE LENSES IF THE PATIENT USUALLY WEARS THEM):
Hide Additional Notes?: No Detail Level: Zone Normal vision: sees adequately in most situations; can see medication labels, newsprint
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
## 2024-01-01 NOTE — PROGRESS NOTE ADULT - ASSESSMENT
77 y/o F with CMML p/w hypotension, worsening fatigue and  jaundice, and anemia    1. CMML progressing on vidaza ( last cycle June 4)  - Jaundice with elevated direct bilirubin , normal liver enzymes and Alk phos, prolonged INR/PT      No evidence of obstruction  on MRCP - most likely leukemic infiltration of liver and disease progression  vs drug related     No chemotherapy can be offered at this point  - Palliative care on board- patient is candidate for hospice, will call hospice  - Keep hb >7 and PLt >15,000K  2. DVT/GI ppx: Sequentials for now      Plan : Full code           Hospice consult           Labs today 77 y/o F with CMML p/w hypotension, worsening fatigue and  jaundice, and anemia    1. CMML progressing on vidaza ( last cycle June 4)  - Jaundice with elevated direct bilirubin , normal liver enzymes and Alk phos, prolonged INR/PT      No evidence of obstruction  on MRCP - most likely leukemic infiltration of liver and disease progression  vs drug related     No chemotherapy can be offered at this point  - Palliative care on board- patient is candidate for hospice, will call hospice  - Keep hb >7 and PLt >15,000K  - d/c ABx    2. DVT/GI ppx: Sequentials for now      Plan : Full code           Hospice consult           Labs today 79 y/o F with CMML p/w hypotension, worsening fatigue and  jaundice, and anemia    1. CMML progressing on vidaza ( last cycle June 4), with liver failure and DIC  - Jaundice with elevated direct bilirubin , normal liver enzymes and Alk phos, prolonged INR/PT      No evidence of obstruction  on MRCP - most likely leukemic infiltration of liver and disease progression  vs drug related     No chemotherapy can be offered at this point  - Palliative care on board- patient is candidate for hospice, will call hospice  - Keep hb >7 and PLt >15,000K  - d/c ABx    2. Sepsis : was suspected on admission , now resolved . d/c ABx  3. DVT/GI ppx: Sequentials for now      Plan : Full code           Hospice consult           Labs today 77 y/o F with CMML p/w hypotension, worsening fatigue and  jaundice, and anemia    1. CMML progressing on vidaza ( last cycle June 4), with liver failure and DIC  - Jaundice with elevated direct bilirubin , normal liver enzymes and Alk phos, prolonged INR/PT      No evidence of obstruction  on MRCP - most likely leukemic infiltration of liver and disease progression  vs drug related     No chemotherapy can be offered at this point  - Palliative care on board- patient is candidate for hospice, will call hospice  - Keep hb >7 and PLt >15,000K  - d/c ABx    2. Sepsis : was suspected on admission , now ruled out . d/c ABx  3. DVT/GI ppx: Sequentials for now      Plan : Full code           Hospice consult           Labs today For more information on Synagis risk factors, visit: [email or web address].org/redbook/book/[email or web address]/[email or web address]-[email or web address]-[email or web address]
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
## 2024-11-14 NOTE — CONSULT NOTE ADULT - ASSESSMENT
Please complete forms,     FMLA forms  Office Visit 11/13/24    Fee Collected $25    Forms sent via [employer] office mail,   Email to forms dept. Email to PSR,     77 y/o female with PMH as above being evaluated for goals of care. Patient is alert and oriented, denies any pain at this time but c/o decreased appetite. Patient is fully aware of prognosis and disease progression, she defers all decision making to her daughter Charlette. Patient said she's had prior discussion with her children regarding her wishes. Palliative NP called daughter and will meet with her today at 3.145pm.           Recommendations:  GOC to be determined  Full code status.   No symptoms to address 79 y/o female with PMH as above being evaluated for goals of care. Patient is alert and oriented, denies any pain at this time but c/o decreased appetite. Patient is fully aware of prognosis and disease progression, she defers all decision making to her daughter Charlette. Palliative team met with daughter to discuss goals of care. Daughter said she aware of patient's wishes and brought the living will with her. Daughter was told by oncologist Dr Jackson, that chemotherapy was no longer beneficial, and she knows her mother will not want to suffer. Daughter is conflicting in making patient a DNI/DNR based on the stipulation in patient's  living will. Palliative team discussed alternative treatment options and daughter wants more information about hospice care. She will consider hospice care at home. Daughter has a sibling who will agree with whatever she decides. She expressed being overwhelmed and shocked with recent events because patient was fully functional and travelled alone as at february. Patient will benefit from pastoral care for her spirituality.     Recommendations:  Hospice consult  Known poor prognosis  Full code status.   No symptoms to address